# Patient Record
Sex: FEMALE | Race: BLACK OR AFRICAN AMERICAN | NOT HISPANIC OR LATINO | Employment: OTHER | URBAN - METROPOLITAN AREA
[De-identification: names, ages, dates, MRNs, and addresses within clinical notes are randomized per-mention and may not be internally consistent; named-entity substitution may affect disease eponyms.]

---

## 2017-09-09 ENCOUNTER — HOSPITAL ENCOUNTER (EMERGENCY)
Facility: HOSPITAL | Age: 62
Discharge: HOME | End: 2017-09-09
Attending: EMERGENCY MEDICINE
Payer: MEDICAID

## 2017-09-09 VITALS
WEIGHT: 293 LBS | BODY MASS INDEX: 47.09 KG/M2 | HEIGHT: 66 IN | OXYGEN SATURATION: 95 % | DIASTOLIC BLOOD PRESSURE: 83 MMHG | HEART RATE: 96 BPM | TEMPERATURE: 98 F | RESPIRATION RATE: 22 BRPM | SYSTOLIC BLOOD PRESSURE: 178 MMHG

## 2017-09-09 DIAGNOSIS — R11.2 NAUSEA AND VOMITING, INTRACTABILITY OF VOMITING NOT SPECIFIED, UNSPECIFIED VOMITING TYPE: Primary | ICD-10-CM

## 2017-09-09 LAB
ALBUMIN SERPL BCP-MCNC: 3.6 G/DL
ALP SERPL-CCNC: 113 U/L
ALT SERPL W/O P-5'-P-CCNC: 12 U/L
ANION GAP SERPL CALC-SCNC: 12 MMOL/L
AST SERPL-CCNC: 14 U/L
BASOPHILS # BLD AUTO: 0.01 K/UL
BASOPHILS NFR BLD: 0.1 %
BILIRUB SERPL-MCNC: 0.8 MG/DL
BUN SERPL-MCNC: 17 MG/DL
CALCIUM SERPL-MCNC: 9.2 MG/DL
CHLORIDE SERPL-SCNC: 104 MMOL/L
CO2 SERPL-SCNC: 25 MMOL/L
CREAT SERPL-MCNC: 1.1 MG/DL
DIFFERENTIAL METHOD: ABNORMAL
EOSINOPHIL # BLD AUTO: 0 K/UL
EOSINOPHIL NFR BLD: 0.2 %
ERYTHROCYTE [DISTWIDTH] IN BLOOD BY AUTOMATED COUNT: 13 %
EST. GFR  (AFRICAN AMERICAN): >60 ML/MIN/1.73 M^2
EST. GFR  (NON AFRICAN AMERICAN): 54 ML/MIN/1.73 M^2
GLUCOSE SERPL-MCNC: 105 MG/DL
HCT VFR BLD AUTO: 39.8 %
HGB BLD-MCNC: 12.9 G/DL
LIPASE SERPL-CCNC: 36 U/L
LYMPHOCYTES # BLD AUTO: 0.6 K/UL
LYMPHOCYTES NFR BLD: 5.7 %
MCH RBC QN AUTO: 30.7 PG
MCHC RBC AUTO-ENTMCNC: 32.4 G/DL
MCV RBC AUTO: 95 FL
MONOCYTES # BLD AUTO: 0.2 K/UL
MONOCYTES NFR BLD: 1.6 %
NEUTROPHILS # BLD AUTO: 9.3 K/UL
NEUTROPHILS NFR BLD: 92 %
PLATELET # BLD AUTO: 256 K/UL
PMV BLD AUTO: 11.7 FL
POTASSIUM SERPL-SCNC: 4.2 MMOL/L
PROT SERPL-MCNC: 8.4 G/DL
RBC # BLD AUTO: 4.2 M/UL
SODIUM SERPL-SCNC: 141 MMOL/L
WBC # BLD AUTO: 10.05 K/UL

## 2017-09-09 PROCEDURE — 99284 EMERGENCY DEPT VISIT MOD MDM: CPT | Mod: 25

## 2017-09-09 PROCEDURE — 96374 THER/PROPH/DIAG INJ IV PUSH: CPT

## 2017-09-09 PROCEDURE — 83690 ASSAY OF LIPASE: CPT

## 2017-09-09 PROCEDURE — 63600175 PHARM REV CODE 636 W HCPCS: Performed by: EMERGENCY MEDICINE

## 2017-09-09 PROCEDURE — 96372 THER/PROPH/DIAG INJ SC/IM: CPT

## 2017-09-09 PROCEDURE — 80053 COMPREHEN METABOLIC PANEL: CPT

## 2017-09-09 PROCEDURE — 25000003 PHARM REV CODE 250: Performed by: EMERGENCY MEDICINE

## 2017-09-09 PROCEDURE — 85025 COMPLETE CBC W/AUTO DIFF WBC: CPT

## 2017-09-09 RX ORDER — ONDANSETRON 2 MG/ML
4 INJECTION INTRAMUSCULAR; INTRAVENOUS
Status: COMPLETED | OUTPATIENT
Start: 2017-09-09 | End: 2017-09-09

## 2017-09-09 RX ORDER — KETOROLAC TROMETHAMINE 30 MG/ML
15 INJECTION, SOLUTION INTRAMUSCULAR; INTRAVENOUS
Status: DISCONTINUED | OUTPATIENT
Start: 2017-09-09 | End: 2017-09-09

## 2017-09-09 RX ORDER — ONDANSETRON 4 MG/1
4 TABLET, FILM COATED ORAL EVERY 6 HOURS
Qty: 12 TABLET | Refills: 0 | Status: SHIPPED | OUTPATIENT
Start: 2017-09-09 | End: 2017-09-18 | Stop reason: ALTCHOICE

## 2017-09-09 RX ORDER — KETOROLAC TROMETHAMINE 30 MG/ML
30 INJECTION, SOLUTION INTRAMUSCULAR; INTRAVENOUS
Status: COMPLETED | OUTPATIENT
Start: 2017-09-09 | End: 2017-09-09

## 2017-09-09 RX ORDER — PROMETHAZINE HYDROCHLORIDE 25 MG/ML
25 INJECTION, SOLUTION INTRAMUSCULAR; INTRAVENOUS
Status: COMPLETED | OUTPATIENT
Start: 2017-09-09 | End: 2017-09-09

## 2017-09-09 RX ADMIN — ONDANSETRON 4 MG: 2 INJECTION INTRAMUSCULAR; INTRAVENOUS at 07:09

## 2017-09-09 RX ADMIN — LIDOCAINE HYDROCHLORIDE: 20 SOLUTION ORAL; TOPICAL at 05:09

## 2017-09-09 RX ADMIN — PROMETHAZINE HYDROCHLORIDE 25 MG: 25 INJECTION INTRAMUSCULAR; INTRAVENOUS at 06:09

## 2017-09-09 RX ADMIN — KETOROLAC TROMETHAMINE 30 MG: 30 INJECTION, SOLUTION INTRAMUSCULAR at 06:09

## 2017-09-09 RX ADMIN — SODIUM CHLORIDE 1000 ML: 0.9 INJECTION, SOLUTION INTRAVENOUS at 06:09

## 2017-09-09 NOTE — ED TRIAGE NOTES
"Pt arrived to ED via EMS with c/o N/V beginning today. Pt also reports pain in the feet and left side of her neck x 4 days. Pt states she went to urgent care this morning and was given a shot to "stop the nausea", with no relief.  "

## 2017-09-09 NOTE — ED PROVIDER NOTES
Encounter Date: 9/9/2017    SCRIBE #1 NOTE: I, Fadi Brown, am scribing for, and in the presence of, Kirby Damon MD. Other sections scribed: HPI, ROS, PE.       History     Chief Complaint   Patient presents with    Abdominal Pain     Generalized abdominal pain since this AM. Pt was evaluated by urgent care pta where she recieved 500 ml NS and 25 mg phenergan. +n/v     CC: Abdominal Pain  HPI: This 62 y.o. obese female with HTN, asthma, and Hx of cholecystectomy presents to the ED via EMS from a nearby urgent care c/o nausea, vomiting, multiple loose stools, and moderate to severe abdominal pain acute onset 0800 this morning. Pt also reports neck pain earlier today that has since resolved.  states that 2 grandkids at home had N/V, but their Sx have already resolved. Pt reports being given a shot of something (25 mg Phenergan, per EMS) for her nausea, but it is starting to come back. Pt denies fever, chills, chest pain, SOB.        The history is provided by the patient and the EMS personnel.     Review of patient's allergies indicates:  No Known Allergies  Past Medical History:   Diagnosis Date    Asthma     Hypertension      Past Surgical History:   Procedure Laterality Date    CHOLECYSTECTOMY       No family history on file.  Social History   Substance Use Topics    Smoking status: Never Smoker    Smokeless tobacco: Never Used    Alcohol use No     Review of Systems   Constitutional: Negative for chills and fever.   HENT: Negative for ear pain, rhinorrhea and sore throat.    Eyes: Negative for pain and visual disturbance.   Respiratory: Negative for cough and shortness of breath.    Cardiovascular: Negative for chest pain.   Gastrointestinal: Positive for abdominal pain, diarrhea, nausea and vomiting. Negative for blood in stool.   Genitourinary: Negative for dysuria and flank pain.   Musculoskeletal: Positive for neck pain (resolved).   Skin: Negative for rash and wound.   Neurological:  Negative for syncope.   All other systems reviewed and are negative.      Physical Exam     Initial Vitals [09/09/17 1620]   BP Pulse Resp Temp SpO2   (!) 164/84 89 (!) 22 97.9 °F (36.6 °C) 99 %      MAP       110.67         Physical Exam    Nursing note reviewed.      PHYSICAL EXAM:    GEN:   Mild distress secondary to nausea, A & Ox3, atraumatic, obese, ill-appearing, non toxic  HEENT:  PERRLA, EOMI, dry membranes, nl conjunctiva, no scleral icterus, no nystagmus, no nodes/nodules, soft, supple, FROM, no trachial deviation, nexus negative  CV:   RRR no m/r/g, 2+ radial pulses, <2sec cap refill  RESP:  CTA B, no w/r/r, equal and bilateral chest rise, no respiratory distress  ABD:   soft, Nontender, Nondistended with large habitus, +BS  BACK:  FROM, no midline tenderness, no paraspinal tenderness  :   Deferred  EXT:   FROM, JIMENEZ x 4, no edema, no calf tenderness, no swelling  LYMPH:  no gross adenopathy  NEURO:  CN II-XII grossly intact, no obvious motor/sensory deficit, negative Romberg, no tremor, nl gait/coordination  PSYCH:   no SI/HI, no anxiety, nl mood/affect, nl judgement/thought process  SKIN:  Warm, dry, intact, no rashes/lesions or masses, nl color, no pallor    ED Course   Procedures  Labs Reviewed   CBC W/ AUTO DIFFERENTIAL - Abnormal; Notable for the following:        Result Value    Gran # 9.3 (*)     Lymph # 0.6 (*)     Mono # 0.2 (*)     Gran% 92.0 (*)     Lymph% 5.7 (*)     Mono% 1.6 (*)     All other components within normal limits   COMPREHENSIVE METABOLIC PANEL - Abnormal; Notable for the following:     eGFR if non  54 (*)     All other components within normal limits   LIPASE   URINALYSIS             Medical Decision Making:   Differential Diagnosis:   Nausea and vomiting differential includes but not exclusive to: DKA, metabolic disturbances, cyclic vomiting syndrome, hyperemesis gravid, Medications and toxic etiologies, Ethanol abuse, viral syndrome, gastritis,  Gastroenteritis, Mechanical obstruction, Gastroparesis, Cholecystitis, Pancreatitis, Hepatitis, Migraine, Increased intracranial pressure, Emotional responses, Psychogenic vomiting, Anxiety disorders, Pain, concussion, Myocardial infarction, Starvation    Clinical Tests:   Lab Tests: Ordered and Reviewed  The following lab test(s) were unremarkable: CBC and CMP  ED Management:  Treatment plan includes physical exam, cardiac monitoring, labs, pain control, IVF and supportive care.    1802 patient's IV continues to malfunction and difficult to get another one.  Will change meds to IM while awaiting for IV access    Patient improved after treatment and tolerating PO  Patient sleeping comfortably in room at time of discharge  Patient and family updated on care             Scribe Attestation:   Scribe #1: I performed the above scribed service and the documentation accurately describes the services I performed. I attest to the accuracy of the note.    Attending Attestation:           Physician Attestation for Scribe:  Physician Attestation Statement for Scribe #1: I, Kirby Damon MD, reviewed documentation, as scribed by Fadi Brown in my presence, and it is both accurate and complete.                 ED Course      Clinical Impression:   The encounter diagnosis was Nausea and vomiting, intractability of vomiting not specified, unspecified vomiting type.    Disposition:   Disposition: Discharged  Condition: Stable                        Kirby Damon MD  09/09/17 1959       Kirby Damon MD  09/09/17 2000

## 2017-09-18 ENCOUNTER — HOSPITAL ENCOUNTER (EMERGENCY)
Facility: HOSPITAL | Age: 62
Discharge: HOME | End: 2017-09-18
Attending: EMERGENCY MEDICINE
Payer: MEDICAID

## 2017-09-18 VITALS
HEART RATE: 80 BPM | TEMPERATURE: 98 F | BODY MASS INDEX: 47.09 KG/M2 | SYSTOLIC BLOOD PRESSURE: 145 MMHG | DIASTOLIC BLOOD PRESSURE: 81 MMHG | OXYGEN SATURATION: 97 % | HEIGHT: 66 IN | WEIGHT: 293 LBS | RESPIRATION RATE: 20 BRPM
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
## 2017-09-18 DIAGNOSIS — S16.1XXA CERVICAL STRAIN, ACUTE, INITIAL ENCOUNTER: Primary | ICD-10-CM

## 2017-09-18 DIAGNOSIS — S39.012A LUMBAR STRAIN, INITIAL ENCOUNTER: ICD-10-CM

## 2017-09-18 PROCEDURE — 99283 EMERGENCY DEPT VISIT LOW MDM: CPT

## 2017-09-18 RX ORDER — NAPROXEN 500 MG/1
500 TABLET ORAL 2 TIMES DAILY WITH MEALS
Qty: 10 TABLET | Refills: 0 | Status: SHIPPED | OUTPATIENT
Start: 2017-09-18 | End: 2017-09-23

## 2017-09-18 RX ORDER — METHOCARBAMOL 750 MG/1
1500 TABLET, FILM COATED ORAL 3 TIMES DAILY PRN
Qty: 30 TABLET | Refills: 0 | Status: SHIPPED | OUTPATIENT
Start: 2017-09-18 | End: 2017-09-23
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
## 2017-09-19 NOTE — DISCHARGE INSTRUCTIONS
Return to the emergency department if you develop worsening pain, sudden weakness, difficulty walking, or for any new or worsening medical concerns.
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
## 2019-04-06 ENCOUNTER — HOSPITAL ENCOUNTER (OUTPATIENT)
Facility: HOSPITAL | Age: 64
Discharge: HOME | End: 2019-04-07
Attending: EMERGENCY MEDICINE | Admitting: HOSPITALIST
Payer: MEDICAID

## 2019-04-06 DIAGNOSIS — R53.1 RIGHT SIDED WEAKNESS: ICD-10-CM

## 2019-04-06 DIAGNOSIS — R20.0 RIGHT ARM NUMBNESS: ICD-10-CM

## 2019-04-06 DIAGNOSIS — T78.3XXA ANGIOEDEMA, INITIAL ENCOUNTER: Primary | ICD-10-CM

## 2019-04-06 DIAGNOSIS — R22.0 FACIAL SWELLING: ICD-10-CM

## 2019-04-06 DIAGNOSIS — R29.898 WEAKNESS OF RIGHT UPPER EXTREMITY: ICD-10-CM

## 2019-04-06 PROBLEM — I10 HTN (HYPERTENSION): Status: ACTIVE | Noted: 2019-04-06

## 2019-04-06 LAB
ALBUMIN SERPL BCP-MCNC: 3.5 G/DL (ref 3.5–5.2)
ALP SERPL-CCNC: 94 U/L (ref 55–135)
ALT SERPL W/O P-5'-P-CCNC: 11 U/L (ref 10–44)
ANION GAP SERPL CALC-SCNC: 9 MMOL/L (ref 8–16)
AST SERPL-CCNC: 15 U/L (ref 10–40)
BASOPHILS # BLD AUTO: 0.02 K/UL (ref 0–0.2)
BASOPHILS NFR BLD: 0.2 % (ref 0–1.9)
BILIRUB SERPL-MCNC: 1 MG/DL (ref 0.1–1)
BUN SERPL-MCNC: 16 MG/DL (ref 8–23)
CALCIUM SERPL-MCNC: 9.5 MG/DL (ref 8.7–10.5)
CHLORIDE SERPL-SCNC: 103 MMOL/L (ref 95–110)
CHOLEST SERPL-MCNC: 205 MG/DL (ref 120–199)
CHOLEST/HDLC SERPL: 3.6 {RATIO} (ref 2–5)
CO2 SERPL-SCNC: 29 MMOL/L (ref 23–29)
CREAT SERPL-MCNC: 0.9 MG/DL (ref 0.5–1.4)
DIFFERENTIAL METHOD: ABNORMAL
EOSINOPHIL # BLD AUTO: 0 K/UL (ref 0–0.5)
EOSINOPHIL NFR BLD: 0.3 % (ref 0–8)
ERYTHROCYTE [DISTWIDTH] IN BLOOD BY AUTOMATED COUNT: 13.1 % (ref 11.5–14.5)
EST. GFR  (AFRICAN AMERICAN): >60 ML/MIN/1.73 M^2
EST. GFR  (NON AFRICAN AMERICAN): >60 ML/MIN/1.73 M^2
GLUCOSE SERPL-MCNC: 90 MG/DL (ref 70–110)
HCT VFR BLD AUTO: 36.2 % (ref 37–48.5)
HDLC SERPL-MCNC: 57 MG/DL (ref 40–75)
HDLC SERPL: 27.8 % (ref 20–50)
HGB BLD-MCNC: 11.5 G/DL (ref 12–16)
INR PPP: 1.1 (ref 0.8–1.2)
LDLC SERPL CALC-MCNC: 127.6 MG/DL (ref 63–159)
LYMPHOCYTES # BLD AUTO: 1.9 K/UL (ref 1–4.8)
LYMPHOCYTES NFR BLD: 18.6 % (ref 18–48)
MCH RBC QN AUTO: 30.5 PG (ref 27–31)
MCHC RBC AUTO-ENTMCNC: 31.8 G/DL (ref 32–36)
MCV RBC AUTO: 96 FL (ref 82–98)
MONOCYTES # BLD AUTO: 0.6 K/UL (ref 0.3–1)
MONOCYTES NFR BLD: 5.5 % (ref 4–15)
NEUTROPHILS # BLD AUTO: 7.8 K/UL (ref 1.8–7.7)
NEUTROPHILS NFR BLD: 75.4 % (ref 38–73)
NONHDLC SERPL-MCNC: 148 MG/DL
PLATELET # BLD AUTO: 262 K/UL (ref 150–350)
PMV BLD AUTO: 11 FL (ref 9.2–12.9)
POTASSIUM SERPL-SCNC: 3.7 MMOL/L (ref 3.5–5.1)
PROT SERPL-MCNC: 8 G/DL (ref 6–8.4)
PROTHROMBIN TIME: 11 SEC (ref 9–12.5)
RBC # BLD AUTO: 3.77 M/UL (ref 4–5.4)
SODIUM SERPL-SCNC: 141 MMOL/L (ref 136–145)
TRIGL SERPL-MCNC: 102 MG/DL (ref 30–150)
TSH SERPL DL<=0.005 MIU/L-ACNC: 1.34 UIU/ML (ref 0.4–4)
WBC # BLD AUTO: 10.31 K/UL (ref 3.9–12.7)

## 2019-04-06 PROCEDURE — 99285 EMERGENCY DEPT VISIT HI MDM: CPT

## 2019-04-06 PROCEDURE — G0378 HOSPITAL OBSERVATION PER HR: HCPCS

## 2019-04-06 PROCEDURE — 99204 PR OFFICE/OUTPT VISIT, NEW, LEVL IV, 45-59 MIN: ICD-10-PCS | Mod: GT,,, | Performed by: PSYCHIATRY & NEUROLOGY

## 2019-04-06 PROCEDURE — 84443 ASSAY THYROID STIM HORMONE: CPT

## 2019-04-06 PROCEDURE — 93005 ELECTROCARDIOGRAM TRACING: CPT

## 2019-04-06 PROCEDURE — 63600175 PHARM REV CODE 636 W HCPCS: Performed by: EMERGENCY MEDICINE

## 2019-04-06 PROCEDURE — 96372 THER/PROPH/DIAG INJ SC/IM: CPT | Mod: 59 | Performed by: EMERGENCY MEDICINE

## 2019-04-06 PROCEDURE — 93010 ELECTROCARDIOGRAM REPORT: CPT | Mod: ,,, | Performed by: INTERNAL MEDICINE

## 2019-04-06 PROCEDURE — 99204 OFFICE O/P NEW MOD 45 MIN: CPT | Mod: GT,,, | Performed by: PSYCHIATRY & NEUROLOGY

## 2019-04-06 PROCEDURE — 82607 VITAMIN B-12: CPT

## 2019-04-06 PROCEDURE — 25000003 PHARM REV CODE 250: Performed by: PHYSICIAN ASSISTANT

## 2019-04-06 PROCEDURE — 85610 PROTHROMBIN TIME: CPT

## 2019-04-06 PROCEDURE — 80053 COMPREHEN METABOLIC PANEL: CPT

## 2019-04-06 PROCEDURE — 93010 EKG 12-LEAD: ICD-10-PCS | Mod: ,,, | Performed by: INTERNAL MEDICINE

## 2019-04-06 PROCEDURE — 80061 LIPID PANEL: CPT

## 2019-04-06 PROCEDURE — 85025 COMPLETE CBC W/AUTO DIFF WBC: CPT

## 2019-04-06 PROCEDURE — 83036 HEMOGLOBIN GLYCOSYLATED A1C: CPT

## 2019-04-06 RX ORDER — FAMOTIDINE 20 MG/1
20 TABLET, FILM COATED ORAL 2 TIMES DAILY
Status: DISCONTINUED | OUTPATIENT
Start: 2019-04-06 | End: 2019-04-07 | Stop reason: HOSPADM

## 2019-04-06 RX ORDER — ASPIRIN 325 MG
325 TABLET ORAL DAILY
Status: DISCONTINUED | OUTPATIENT
Start: 2019-04-07 | End: 2019-04-07 | Stop reason: HOSPADM

## 2019-04-06 RX ORDER — DIPHENHYDRAMINE HCL 25 MG
25 CAPSULE ORAL EVERY 8 HOURS
Status: DISCONTINUED | OUTPATIENT
Start: 2019-04-06 | End: 2019-04-07 | Stop reason: HOSPADM

## 2019-04-06 RX ORDER — ATORVASTATIN CALCIUM 10 MG/1
10 TABLET, FILM COATED ORAL DAILY
Status: DISCONTINUED | OUTPATIENT
Start: 2019-04-07 | End: 2019-04-07

## 2019-04-06 RX ORDER — SODIUM CHLORIDE 0.9 % (FLUSH) 0.9 %
10 SYRINGE (ML) INJECTION
Status: DISCONTINUED | OUTPATIENT
Start: 2019-04-06 | End: 2019-04-07 | Stop reason: HOSPADM

## 2019-04-06 RX ORDER — RAMELTEON 8 MG/1
8 TABLET ORAL NIGHTLY PRN
Status: DISCONTINUED | OUTPATIENT
Start: 2019-04-06 | End: 2019-04-07 | Stop reason: HOSPADM

## 2019-04-06 RX ORDER — POLYETHYLENE GLYCOL 3350 17 G/17G
17 POWDER, FOR SOLUTION ORAL DAILY
Status: DISCONTINUED | OUTPATIENT
Start: 2019-04-07 | End: 2019-04-07 | Stop reason: HOSPADM

## 2019-04-06 RX ORDER — ENOXAPARIN SODIUM 100 MG/ML
40 INJECTION SUBCUTANEOUS EVERY 24 HOURS
Status: DISCONTINUED | OUTPATIENT
Start: 2019-04-06 | End: 2019-04-07 | Stop reason: HOSPADM

## 2019-04-06 RX ORDER — ONDANSETRON 2 MG/ML
8 INJECTION INTRAMUSCULAR; INTRAVENOUS EVERY 8 HOURS PRN
Status: DISCONTINUED | OUTPATIENT
Start: 2019-04-06 | End: 2019-04-07 | Stop reason: HOSPADM

## 2019-04-06 RX ORDER — DIPHENHYDRAMINE HCL 25 MG
25 CAPSULE ORAL EVERY 6 HOURS PRN
Status: DISCONTINUED | OUTPATIENT
Start: 2019-04-06 | End: 2019-04-06

## 2019-04-06 RX ORDER — FERROUS GLUCONATE 324(38)MG
324 TABLET ORAL
Status: DISCONTINUED | OUTPATIENT
Start: 2019-04-07 | End: 2019-04-07 | Stop reason: HOSPADM

## 2019-04-06 RX ORDER — FLUTICASONE FUROATE AND VILANTEROL 100; 25 UG/1; UG/1
1 POWDER RESPIRATORY (INHALATION) DAILY
Status: DISCONTINUED | OUTPATIENT
Start: 2019-04-07 | End: 2019-04-06

## 2019-04-06 RX ORDER — IPRATROPIUM BROMIDE AND ALBUTEROL SULFATE 2.5; .5 MG/3ML; MG/3ML
3 SOLUTION RESPIRATORY (INHALATION) EVERY 4 HOURS PRN
Status: DISCONTINUED | OUTPATIENT
Start: 2019-04-06 | End: 2019-04-07 | Stop reason: HOSPADM

## 2019-04-06 RX ORDER — ACETAMINOPHEN 500 MG
500 TABLET ORAL EVERY 6 HOURS PRN
Status: DISCONTINUED | OUTPATIENT
Start: 2019-04-06 | End: 2019-04-07 | Stop reason: HOSPADM

## 2019-04-06 RX ORDER — HYDRALAZINE HYDROCHLORIDE 20 MG/ML
10 INJECTION INTRAMUSCULAR; INTRAVENOUS EVERY 8 HOURS PRN
Status: DISCONTINUED | OUTPATIENT
Start: 2019-04-06 | End: 2019-04-07

## 2019-04-06 RX ORDER — IBUPROFEN 200 MG
200 TABLET ORAL EVERY 6 HOURS PRN
COMMUNITY
End: 2019-04-06 | Stop reason: ALTCHOICE

## 2019-04-06 RX ADMIN — ACETAMINOPHEN 500 MG: 500 TABLET, FILM COATED ORAL at 09:04

## 2019-04-06 RX ADMIN — DIPHENHYDRAMINE HYDROCHLORIDE 25 MG: 25 CAPSULE ORAL at 06:04

## 2019-04-06 RX ADMIN — ENOXAPARIN SODIUM 40 MG: 100 INJECTION SUBCUTANEOUS at 06:04

## 2019-04-06 RX ADMIN — FAMOTIDINE 20 MG: 20 TABLET ORAL at 09:04

## 2019-04-06 NOTE — NURSING
Report received from Nathaly in ED. Pt to come to room 330B. Food/Nutrition notified for dinner tray.

## 2019-04-06 NOTE — ASSESSMENT & PLAN NOTE
Area of right lower lip swelling. No involvement of posterior oropharynx, tongue, or sublingual space. Likely due to lisinopril. Last took almost 24 hrs ago,  reports improving now.  Will start pepcid/benadryl  D/c lisinopril  Aspiration precautions

## 2019-04-06 NOTE — SUBJECTIVE & OBJECTIVE
Past Medical History:   Diagnosis Date    Asthma     Bronchitis     Hypertension        Past Surgical History:   Procedure Laterality Date    ANKLE SURGERY       SECTION      CHOLECYSTECTOMY         Review of patient's allergies indicates:  No Known Allergies    No current facility-administered medications on file prior to encounter.      Current Outpatient Medications on File Prior to Encounter   Medication Sig    albuterol (ACCUNEB) 0.63 mg/3 mL Nebu Take 0.63 mg by nebulization every 6 (six) hours as needed.    cyclobenzaprine (FEXMID) 7.5 MG Tab Take 10 mg by mouth 3 (three) times daily as needed.    ferrous gluconate (FERGON) 324 MG tablet Take 324 mg by mouth daily with breakfast.    FLUTICASONE/VILANTEROL (BREO ELLIPTA INHL) Inhale into the lungs.    lisinopril-hydrochlorothiazide (PRINZIDE,ZESTORETIC) 20-25 mg Tab Take 1 tablet by mouth once daily.    [DISCONTINUED] ibuprofen (ADVIL,MOTRIN) 200 MG tablet Take 200 mg by mouth every 6 (six) hours as needed for Pain.    fluticasone-salmeterol 250-50 mcg/dose (ADVAIR) 250-50 mcg/dose diskus inhaler Inhale 1 puff into the lungs 2 (two) times daily.     Family History     None        Tobacco Use    Smoking status: Never Smoker    Smokeless tobacco: Never Used   Substance and Sexual Activity    Alcohol use: No    Drug use: No    Sexual activity: Not on file     Review of Systems   Constitutional: Negative for chills and fever.   HENT: Negative for nosebleeds and tinnitus.    Eyes: Negative for photophobia and visual disturbance.   Respiratory: Negative for shortness of breath and wheezing.    Cardiovascular: Negative for chest pain, palpitations and leg swelling.   Gastrointestinal: Negative for abdominal distention, nausea and vomiting.   Genitourinary: Negative for dysuria, flank pain and hematuria.   Musculoskeletal: Negative for gait problem and joint swelling.   Skin: Negative for rash and wound.   Neurological: Positive for facial  asymmetry (facial swelling), weakness (all right sided) and numbness (all right sided). Negative for seizures and syncope.     Objective:     Vital Signs (Most Recent):  Temp: 98.8 °F (37.1 °C) (04/06/19 1743)  Pulse: 88 (04/06/19 1743)  Resp: 18 (04/06/19 1743)  BP: (!) 148/68 (04/06/19 1743)  SpO2: 100 % (04/06/19 1743) Vital Signs (24h Range):  Temp:  [98.7 °F (37.1 °C)-98.9 °F (37.2 °C)] 98.8 °F (37.1 °C)  Pulse:  [] 88  Resp:  [14-19] 18  SpO2:  [98 %-100 %] 100 %  BP: (132-182)/(62-99) 148/68     Weight: 134.7 kg (297 lb)  Body mass index is 47.94 kg/m².    Physical Exam   Constitutional: She is oriented to person, place, and time. She appears well-developed and well-nourished. No distress.   HENT:   Head: Normocephalic and atraumatic.   Right Ear: External ear normal.   Left Ear: External ear normal.   Swelling and tenderness to right lower lip, stops at midline. No tenderness of any teeth, no swelling to sublingual space or lower mandible, no posterior oropharynx swelling. Tolerating secretions, no difficulty speaking   Eyes: Conjunctivae and EOM are normal. Right eye exhibits no discharge. Left eye exhibits no discharge.   Neck: Normal range of motion. No thyromegaly present.   Cardiovascular: Normal rate and regular rhythm.   No murmur heard.  Pulmonary/Chest: Effort normal and breath sounds normal. No respiratory distress.   Abdominal: Soft. Bowel sounds are normal. She exhibits no distension and no mass. There is no tenderness.   Musculoskeletal: She exhibits no edema, tenderness or deformity.   Neurological: She is alert and oriented to person, place, and time.   5/5 strength lower extremities, able to hold both legs for 5 seconds off stretch. Finger to thumb intact. 5/5 strength left UE. Unable to lift right UE off of stretcher. When upper extremity is lifted patient able to hold a loft for 5 seconds.   3/5 strength with bending the right elbow   Skin: Skin is warm and dry.   Psychiatric: She has  a normal mood and affect. Her behavior is normal.   Nursing note and vitals reviewed.        CRANIAL NERVES     CN III, IV, VI   Extraocular motions are normal.        Significant Labs:   CBC:   Recent Labs   Lab 04/06/19  1544   WBC 10.31   HGB 11.5*   HCT 36.2*        CMP:   Recent Labs   Lab 04/06/19  1544      K 3.7      CO2 29   GLU 90   BUN 16   CREATININE 0.9   CALCIUM 9.5   PROT 8.0   ALBUMIN 3.5   BILITOT 1.0   ALKPHOS 94   AST 15   ALT 11   ANIONGAP 9   EGFRNONAA >60     Coagulation:   Recent Labs   Lab 04/06/19  1544   INR 1.1     Lipid Panel:   Recent Labs   Lab 04/06/19  1544   CHOL 205*   HDL 57   LDLCALC 127.6   TRIG 102   CHOLHDL 27.8     TSH:   Recent Labs   Lab 04/06/19  1544   TSH 1.343       Significant Imaging:    Imaging Results          X-Ray Chest AP Portable (Final result)  Result time 04/06/19 16:05:18    Final result by Fran Daly MD (04/06/19 16:05:18)                 Impression:      1. No acute radiographic findings in the chest on this single view.      Electronically signed by: Fran Daly MD  Date:    04/06/2019  Time:    16:05             Narrative:    EXAMINATION:  XR CHEST AP PORTABLE    CLINICAL HISTORY:  Stroke;    TECHNIQUE:  Single frontal view of the chest was performed.    COMPARISON:  Radiograph 11/09/2016.    FINDINGS:  Cardiac monitoring leads project over the bilateral hemithoraces.  Mediastinal structures are midline.  Hilar contours are unremarkable.  Cardiac silhouette is normal in size.  Lung volumes are symmetric.  No consolidation.  No pneumothorax or pleural effusions.  No free air beneath the diaphragm.                               CT Head Without Contrast (Final result)  Result time 04/06/19 15:13:54    Final result by Dalton Pantoja MD (04/06/19 15:13:54)                 Impression:      No acute intracranial process.  MRI of the brain may be obtained for further evaluation, as clinically warranted.      Electronically signed  by: Dalton Pantoja MD  Date:    04/06/2019  Time:    15:13             Narrative:    EXAMINATION:  CT HEAD WITHOUT CONTRAST    CLINICAL HISTORY:  Sensation loss;    TECHNIQUE:  Low dose axial images were obtained through the head.  Coronal and sagittal reformations were also performed. Contrast was not administered.    COMPARISON:  CT scan of the head dated 11/09/2016.    FINDINGS:  The subcutaneous tissues are unremarkable.  The bony calvarium intact.  The paranasal sinuses and mastoid air cells are clear.  The orbits and intraorbital contents are within normal limits.    The craniocervical junction is within normal limits.  There are no extra-axial fluid collections.  There is no evidence of intracranial hemorrhage.  The ventricles and sulci are within normal limits.  The gray-white differentiation is maintained.  There is no evidence of mass effect.  There is no dense vessel sign.

## 2019-04-06 NOTE — ED PROVIDER NOTES
Encounter Date: 2019     This is a 64 y.o. female complaining of swelling to right lower lip and right facial swelling that she first noticed upon waking this moring. Also reports numbness to her right upper and lower extremities that were present upon waking. Head CT ordered.    I have evaluated and conducted a medical screening exam with initial orders entered, if indicated, to expedite care. The patient will be placed in a treatment area when one is available. Care will be transferred to an alternate provider for a full assessment including but not limited to: history, physical exam, additional orders, and final disposition.    Paul Cole NP      SCRIBE #1 NOTE: IIsaac am scribing for, and in the presence of,  Ochoa Kenny MD. I have scribed the following portions of the note - Other sections scribed: HPI, ROS.       History     Chief Complaint   Patient presents with    Extremity Weakness     pt c/o right sided weakness and swelling to right jaw area began this morning     CC: Extremity Weakness    64 year old female  has a past medical history of Asthma, Bronchitis, and Hypertension presents to the ED for evaluation of right sided facial numbness, right arm weakness, and pain w/ swelling to right jaw that began this morning upon waking up at 8 am. Pt denies any associated dental pain. Pt reports she went to sleep normal last night. She reports chills last night. No other symptoms reported.     The history is provided by the patient. No  was used.     Review of patient's allergies indicates:  No Known Allergies  Past Medical History:   Diagnosis Date    Asthma     Bronchitis     Hypertension      Past Surgical History:   Procedure Laterality Date    ANKLE SURGERY       SECTION      CHOLECYSTECTOMY       History reviewed. No pertinent family history.  Social History     Tobacco Use    Smoking status: Never Smoker    Smokeless tobacco: Never Used    Substance Use Topics    Alcohol use: No    Drug use: No     Review of Systems   Constitutional: Negative for appetite change and fever.   HENT: Positive for facial swelling (to right jaw). Negative for rhinorrhea and sore throat.    Eyes: Negative for visual disturbance.   Respiratory: Negative for cough and shortness of breath.    Cardiovascular: Negative for chest pain.   Gastrointestinal: Negative for abdominal pain.   Genitourinary: Negative for dysuria.   Musculoskeletal: Negative for gait problem.   Skin: Negative for rash.   Neurological: Positive for weakness (to right arm) and numbness (to face). Negative for syncope.       Physical Exam     Initial Vitals [04/06/19 1459]   BP Pulse Resp Temp SpO2   132/62 100 18 98.9 °F (37.2 °C) 100 %      MAP       --         Physical Exam  The patient was examined specifically for the following:   General:No significant distress, Good color, Warm and dry. Head and neck:Scalp atraumatic, Neck supple. Neurological:Appropriate conversation, Gross motor deficits. Eyes:Conjugate gaze, Clear corneas. ENT: No epistaxis. Cardiac: Regular rate and rhythm, Grossly normal heart tones. Pulmonary: Wheezing, Rales. Gastrointestinal: Abdominal tenderness, Abdominal distention. Musculoskeletal: Extremity deformity, Apparent pain with range of motion of the joints. Skin: Rash.   The findings on examination were normal except for the following:  The patient has swelling and tenderness of the right mandibular face.  Cranial nerves appear to be intact.  There are no visual field cuts.  Patient is appropriate in conversation.  She seems to get some questions wrong.  She has weakness in the right upper extremity.  The family reports that she is walking.  She will not hold the right lower extremity off of the stretcher.  The abdomen is soft.  The lungs are clear.  The heart tones are normal.  ED Course   Procedures  Labs Reviewed   CBC W/ AUTO DIFFERENTIAL - Abnormal; Notable for the  following components:       Result Value    RBC 3.77 (*)     Hemoglobin 11.5 (*)     Hematocrit 36.2 (*)     MCHC 31.8 (*)     Gran # (ANC) 7.8 (*)     Gran% 75.4 (*)     All other components within normal limits   LIPID PANEL - Abnormal; Notable for the following components:    Cholesterol 205 (*)     All other components within normal limits   COMPREHENSIVE METABOLIC PANEL   PROTIME-INR   TSH   POCT GLUCOSE, HAND-HELD DEVICE   POCT GLUCOSE MONITORING CONTINUOUS     EKG Readings: (Independently Interpreted)   This patient is in a normal sinus rhythm with a heart rate of 97.  The p.r. QRS and QT intervals are normal.  There is no definite evidence of acute myocardial infarction or malignant arrhythmia.  There are some low voltage QRS complexes.  There are some nonspecific T-wave changes.       Imaging Results          X-Ray Chest AP Portable (Final result)  Result time 04/06/19 16:05:18    Final result by Fran Daly MD (04/06/19 16:05:18)                 Impression:      1. No acute radiographic findings in the chest on this single view.      Electronically signed by: Fran Daly MD  Date:    04/06/2019  Time:    16:05             Narrative:    EXAMINATION:  XR CHEST AP PORTABLE    CLINICAL HISTORY:  Stroke;    TECHNIQUE:  Single frontal view of the chest was performed.    COMPARISON:  Radiograph 11/09/2016.    FINDINGS:  Cardiac monitoring leads project over the bilateral hemithoraces.  Mediastinal structures are midline.  Hilar contours are unremarkable.  Cardiac silhouette is normal in size.  Lung volumes are symmetric.  No consolidation.  No pneumothorax or pleural effusions.  No free air beneath the diaphragm.                               CT Head Without Contrast (Final result)  Result time 04/06/19 15:13:54    Final result by Dalton Pantoja MD (04/06/19 15:13:54)                 Impression:      No acute intracranial process.  MRI of the brain may be obtained for further evaluation, as clinically  warranted.      Electronically signed by: Dalton Pantoja MD  Date:    04/06/2019  Time:    15:13             Narrative:    EXAMINATION:  CT HEAD WITHOUT CONTRAST    CLINICAL HISTORY:  Sensation loss;    TECHNIQUE:  Low dose axial images were obtained through the head.  Coronal and sagittal reformations were also performed. Contrast was not administered.    COMPARISON:  CT scan of the head dated 11/09/2016.    FINDINGS:  The subcutaneous tissues are unremarkable.  The bony calvarium intact.  The paranasal sinuses and mastoid air cells are clear.  The orbits and intraorbital contents are within normal limits.    The craniocervical junction is within normal limits.  There are no extra-axial fluid collections.  There is no evidence of intracranial hemorrhage.  The ventricles and sulci are within normal limits.  The gray-white differentiation is maintained.  There is no evidence of mass effect.  There is no dense vessel sign.                              Medical decision making:  Given the above this patient complains of numbness and tingling in both feet numbness in the right side of her body right leg right arm.  The face is spared.  She has weakness in the right upper extremity.  The neck is supple.  Mental status examination, cranial nerves, motor and sensory examination are normal. Her symptoms began this morning.  Stroke remains in the differential diagnosis.  The patient was seen by vascular Neurology who feels this stroke is actually unlikely.  The patient is outside the window for tPA.  The patient has swelling on her face.  Angioedema is considered as well as cellulitis.  The patient is very tender at this location.  But there are no tender teeth.  I will treat her with Benadryl.  Note should be made that the teeth do not seem to be tender.  I will discuss case with hospital medicine.  The patient is on lisinopril.  I am concerned that this could be angioedema in the face.                Scribe Attestation:    Scribe #1: I performed the above scribed service and the documentation accurately describes the services I performed. I attest to the accuracy of the note.    Attending Attestation:           Physician Attestation for Scribe:  Physician Attestation Statement for Scribe #1: I, Ochoa Kenny MD, reviewed documentation, as scribed by Isaac Jackson in my presence, and it is both accurate and complete.                    Clinical Impression:       ICD-10-CM ICD-9-CM   1. Weakness of right upper extremity R29.898 729.89   2. Facial swelling R22.0 784.2   3. Right arm numbness R20.2 782.0                                Ochoa Kenny MD  04/06/19 1657       Ochoa Kenny MD  04/06/19 1706

## 2019-04-06 NOTE — HOSPITAL COURSE
Santa Plascencia 64 y.o. female admitted to observation for right sided weakness, right facial numbness and right lip/tongue swelling due to angioedema. In the ED, CT head without acute abnormality, chest x-ray without acute process, EKG of NSR, Tele stroke consulted and recommended MRI. Angioedema felt due to lisinopril-now listed as allergy.  US carotid shwoed 50-69% luminal narrowing of left distal ICA. Patient refused MRI. On 4/7/2019, Angioedema improved with H1, H2. Right sided weakness and facial numbness improved overnight but not at baseline. Patient agreed for MRI with ativan prior this am. MRI no acute stroke. Symptoms significantly improved throughout stay. Possible TIA as patient have risk factors. Instruct patient to continue ASA and statin (new). Instruct patient need to follow up with Vascular Surgery for left carotid stenosis.  Due to insurance, patient will need to follow with PCP to obtain refer to vascular surgery.  Discharge summary will be routed to PCP.  Patient stable for discharge home with home health.

## 2019-04-06 NOTE — H&P
"[facility] Hospital Medicine  History & Physical    Patient Name: Santa Plascencia  MRN: [id number]  Admission Date: 2019  Attending Physician: Leticia Aleman MD   Primary Care Provider: Joao Wilcox Jr, MD         Patient information was obtained from patient, past medical records and ER records.     Subjective:     Principal Problem:Right sided numbness    Chief Complaint:   Chief Complaint   Patient presents with    Extremity Weakness     pt c/o right sided weakness and swelling to right jaw area began this morning        HPI: Santa Plascencia 64 y.o. female with HTN presents to the hospital with a chief complaint of right sided weakness. She reports she awoke this morning with right sided facial/arm numbness. This numbness is improving now, but still present. She also felt as though her right arm was weak as well. She denies any vision changes. She thought she may have had a "little headache" this morning, but it resolved on its own. She also awoke this morning with right lower lip swelling. Her  reports it has always been localized to the right lower lip. It is improving now the  reports. She has never had difficulty talking or swallowing per the  and patient. She denies any numbness or weakness legs, but reports she occasionally has sharp pains in her feet. She denies fever, SOB, difficulty swallowing, incontinence, and numbness to the legs, and slurred speech. She endorses right sided weakness/numbness and facial swelling. She takes lisinopril for BP and has taken for many years. She last took lisinopril last night.     In the ED, CT head without acute abnormality, chest x-ray without acute process, EKG of NSR, Tele stroke consulted and recommended MRI.     Past Medical History:   Diagnosis Date    Asthma     Bronchitis     Hypertension        Past Surgical History:   Procedure Laterality Date    ANKLE SURGERY       SECTION      CHOLECYSTECTOMY   "       Review of patient's allergies indicates:  No Known Allergies    No current facility-administered medications on file prior to encounter.      Current Outpatient Medications on File Prior to Encounter   Medication Sig    albuterol (ACCUNEB) 0.63 mg/3 mL Nebu Take 0.63 mg by nebulization every 6 (six) hours as needed.    cyclobenzaprine (FEXMID) 7.5 MG Tab Take 10 mg by mouth 3 (three) times daily as needed.    ferrous gluconate (FERGON) 324 MG tablet Take 324 mg by mouth daily with breakfast.    FLUTICASONE/VILANTEROL (BREO ELLIPTA INHL) Inhale into the lungs.    lisinopril-hydrochlorothiazide (PRINZIDE,ZESTORETIC) 20-25 mg Tab Take 1 tablet by mouth once daily.    [DISCONTINUED] ibuprofen (ADVIL,MOTRIN) 200 MG tablet Take 200 mg by mouth every 6 (six) hours as needed for Pain.    fluticasone-salmeterol 250-50 mcg/dose (ADVAIR) 250-50 mcg/dose diskus inhaler Inhale 1 puff into the lungs 2 (two) times daily.     Family History     None        Tobacco Use    Smoking status: Never Smoker    Smokeless tobacco: Never Used   Substance and Sexual Activity    Alcohol use: No    Drug use: No    Sexual activity: Not on file     Review of Systems   Constitutional: Negative for chills and fever.   HENT: Negative for nosebleeds and tinnitus.    Eyes: Negative for photophobia and visual disturbance.   Respiratory: Negative for shortness of breath and wheezing.    Cardiovascular: Negative for chest pain, palpitations and leg swelling.   Gastrointestinal: Negative for abdominal distention, nausea and vomiting.   Genitourinary: Negative for dysuria, flank pain and hematuria.   Musculoskeletal: Negative for gait problem and joint swelling.   Skin: Negative for rash and wound.   Neurological: Positive for facial asymmetry (facial swelling), weakness (all right sided) and numbness (all right sided). Negative for seizures and syncope.     Objective:     Vital Signs (Most Recent):  Temp: 98.8 °F (37.1 °C) (04/06/19  1743)  Pulse: 88 (04/06/19 1743)  Resp: 18 (04/06/19 1743)  BP: (!) 148/68 (04/06/19 1743)  SpO2: 100 % (04/06/19 1743) Vital Signs (24h Range):  Temp:  [98.7 °F (37.1 °C)-98.9 °F (37.2 °C)] 98.8 °F (37.1 °C)  Pulse:  [] 88  Resp:  [14-19] 18  SpO2:  [98 %-100 %] 100 %  BP: (132-182)/(62-99) 148/68     Weight: 134.7 kg (297 lb)  Body mass index is 47.94 kg/m².    Physical Exam   Constitutional: She is oriented to person, place, and time. She appears well-developed and well-nourished. No distress.   HENT:   Head: Normocephalic and atraumatic.   Right Ear: External ear normal.   Left Ear: External ear normal.   Swelling and tenderness to right lower lip, stops at midline. No tenderness of any teeth, no swelling to sublingual space or lower mandible, no posterior oropharynx swelling. Tolerating secretions, no difficulty speaking   Eyes: Conjunctivae and EOM are normal. Right eye exhibits no discharge. Left eye exhibits no discharge.   Neck: Normal range of motion. No thyromegaly present.   Cardiovascular: Normal rate and regular rhythm.   No murmur heard.  Pulmonary/Chest: Effort normal and breath sounds normal. No respiratory distress.   Abdominal: Soft. Bowel sounds are normal. She exhibits no distension and no mass. There is no tenderness.   Musculoskeletal: She exhibits no edema, tenderness or deformity.   Neurological: She is alert and oriented to person, place, and time.   5/5 strength lower extremities, able to hold both legs for 5 seconds off stretch. Finger to thumb intact. 5/5 strength left UE. Unable to lift right UE off of stretcher. When upper extremity is lifted patient able to hold a loft for 5 seconds.   3/5 strength with bending the right elbow   Skin: Skin is warm and dry.   Psychiatric: She has a normal mood and affect. Her behavior is normal.   Nursing note and vitals reviewed.        CRANIAL NERVES     CN III, IV, VI   Extraocular motions are normal.        Significant Labs:   CBC:   Recent  Labs   Lab 04/06/19  1544   WBC 10.31   HGB 11.5*   HCT 36.2*        CMP:   Recent Labs   Lab 04/06/19  1544      K 3.7      CO2 29   GLU 90   BUN 16   CREATININE 0.9   CALCIUM 9.5   PROT 8.0   ALBUMIN 3.5   BILITOT 1.0   ALKPHOS 94   AST 15   ALT 11   ANIONGAP 9   EGFRNONAA >60     Coagulation:   Recent Labs   Lab 04/06/19  1544   INR 1.1     Lipid Panel:   Recent Labs   Lab 04/06/19  1544   CHOL 205*   HDL 57   LDLCALC 127.6   TRIG 102   CHOLHDL 27.8     TSH:   Recent Labs   Lab 04/06/19  1544   TSH 1.343       Significant Imaging:    Imaging Results          X-Ray Chest AP Portable (Final result)  Result time 04/06/19 16:05:18    Final result by Fran Daly MD (04/06/19 16:05:18)                 Impression:      1. No acute radiographic findings in the chest on this single view.      Electronically signed by: Fran Daly MD  Date:    04/06/2019  Time:    16:05             Narrative:    EXAMINATION:  XR CHEST AP PORTABLE    CLINICAL HISTORY:  Stroke;    TECHNIQUE:  Single frontal view of the chest was performed.    COMPARISON:  Radiograph 11/09/2016.    FINDINGS:  Cardiac monitoring leads project over the bilateral hemithoraces.  Mediastinal structures are midline.  Hilar contours are unremarkable.  Cardiac silhouette is normal in size.  Lung volumes are symmetric.  No consolidation.  No pneumothorax or pleural effusions.  No free air beneath the diaphragm.                               CT Head Without Contrast (Final result)  Result time 04/06/19 15:13:54    Final result by Dalton Pantoja MD (04/06/19 15:13:54)                 Impression:      No acute intracranial process.  MRI of the brain may be obtained for further evaluation, as clinically warranted.      Electronically signed by: Dalton Pantoja MD  Date:    04/06/2019  Time:    15:13             Narrative:    EXAMINATION:  CT HEAD WITHOUT CONTRAST    CLINICAL HISTORY:  Sensation loss;    TECHNIQUE:  Low dose axial images were  obtained through the head.  Coronal and sagittal reformations were also performed. Contrast was not administered.    COMPARISON:  CT scan of the head dated 11/09/2016.    FINDINGS:  The subcutaneous tissues are unremarkable.  The bony calvarium intact.  The paranasal sinuses and mastoid air cells are clear.  The orbits and intraorbital contents are within normal limits.    The craniocervical junction is within normal limits.  There are no extra-axial fluid collections.  There is no evidence of intracranial hemorrhage.  The ventricles and sulci are within normal limits.  The gray-white differentiation is maintained.  There is no evidence of mass effect.  There is no dense vessel sign.                                  Assessment/Plan:     * Right sided numbness  Woke up with symptoms this morning. Symptoms improving now. CT without acute abnormality. Stroke neuro recommends MRI.   MRI  Carotid US/Echo  Neurology consulted  PT/OT  Starting Aspirin and statin  Neuro checks, aspiration precautions  Nursing swallow asessment    HTN (hypertension)  Holding lisinopril/hctz combo until CVA ruled out. Will d/c lisinopril due to probable angioedema. Allow permissive HTN to 220    Angioedema  Area of right lower lip swelling. No involvement of posterior oropharynx, tongue, or sublingual space. Likely due to lisinopril. Last took almost 24 hrs ago,  reports improving now. Afebrile without leukocytosis.  Will start pepcid/benadryl  D/c lisinopril  Aspiration precautions      Weakness of right upper extremity  See above      VTE Risk Mitigation (From admission, onward)        Ordered     enoxaparin injection 40 mg  Daily      04/06/19 1747     IP VTE HIGH RISK PATIENT  Once      04/06/19 1747        VTE: lovenox  Code: full  Diet: cardiac  Dispo: pending MRI and neuro recs    ADDENDUM 19:47  Patient refused MRI. She declined medication for claustrauphobia as well. Will re-attempt in the morning.     Zaki Laboy,  BREE  Department of Hospital Medicine   [facility]

## 2019-04-06 NOTE — HPI
"Santa Plascencia 64 y.o. female with HTN presents to the hospital with a chief complaint of right sided weakness. She reports she awoke this morning with right sided facial/arm numbness. This numbness is improving now, but still present. She also felt as though her right arm was weak as well. She denies any vision changes. She thought she may have had a "little headache" this morning, but it resolved on its own. She also awoke this morning with right lower lip swelling. Her  reports it has always been localized to the right lower lip. It is improving now the  reports. She has never had difficulty talking or swallowing per the  and patient. She denies any numbness or weakness legs, but reports she occasionally has sharp pains in her feet. She denies fever, SOB, difficulty swallowing, incontinence, and numbness to the legs, and slurred speech. She endorses right sided weakness/numbness and facial swelling. She takes lisinopril for BP and has taken for many years. She last took lisinopril last night.     In the ED, CT head without acute abnormality, chest x-ray without acute process, EKG of NSR, Tele stroke consulted and recommended MRI.   "

## 2019-04-06 NOTE — ED TRIAGE NOTES
Pt arrived to ED with c/o extremity weakness. Pt complains of R sided arm weakness and numbness and Right jaw swelling. Pt states woke up this morning like this. Denies trouble breathing, states hard to swallow. NAD. Pt connected to continuous cardiac monitoring, bp cuff, and pulse ox. Call light within reach.

## 2019-04-06 NOTE — HPI
63 yo female with right face numbness and associated right arm numbness.  No clear triggers, went to bed normal. Has not had in the past.  Has not been treated. Has not improved.

## 2019-04-06 NOTE — ASSESSMENT & PLAN NOTE
Woke up with symptoms this morning. Symptoms improving now. CT without acute abnormality. Stroke neuro recommends MRI.   MRI  Carotid US/Echo  Neurology consulted  PT/OT  Starting Aspirin and statin  Neuro checks, aspiration precautions  Nursing swallow asessment

## 2019-04-06 NOTE — ASSESSMENT & PLAN NOTE
65 yo female with complaints of numbness on the right side. Unclear cause, outside of any treatment windows and exam fails to show cortical signs.

## 2019-04-06 NOTE — SUBJECTIVE & OBJECTIVE
"  Woke up with symptoms?: yes  Last known normal:        Recent bleeding noted: no  Does the patient take any Blood Thinners? no  Medications: No relevant medications      Past Medical History: hypertension    Past Surgical History: none    Family History: no relevant history    Social History: no smoking, no drinking, no drugs    Allergies: No Known Allergies     Review of Systems   Constitutional: Negative for chills and fever.   HENT: Negative for nosebleeds and sore throat.    Eyes: Negative for photophobia, pain and redness.   Respiratory: Negative for cough and shortness of breath.    Cardiovascular: Negative for chest pain and palpitations.   Gastrointestinal: Negative for abdominal pain, blood in stool, diarrhea and nausea.   Endocrine: Negative for cold intolerance and heat intolerance.   Genitourinary: Negative for difficulty urinating and hematuria.   Musculoskeletal: Negative for arthralgias, back pain and neck pain.   Skin: Negative for color change and rash.   Neurological: Negative for light-headedness and headaches.   Hematological: Does not bruise/bleed easily.   Psychiatric/Behavioral: Negative for confusion and hallucinations.     Objective:   Vitals: Blood pressure (!) 178/80, pulse 94, temperature 98.9 °F (37.2 °C), temperature source Oral, resp. rate 17, height 5' 6" (1.676 m), weight 134.7 kg (297 lb), SpO2 100 %, not currently breastfeeding.     CT READ: Yes  No hemmorhage. No mass effect. No early infarct signs.     Physical Exam   Constitutional: She appears well-developed and well-nourished.   HENT:   Head: Normocephalic and atraumatic.   Right Ear: External ear normal.   Left Ear: External ear normal.   Eyes: Conjunctivae and EOM are normal.   Neck: Normal range of motion. No tracheal deviation present.   Pulmonary/Chest: Effort normal. No respiratory distress.   Abdominal: She exhibits no distension.   Musculoskeletal: Normal range of motion.   Neurological: She is alert.   Skin: Skin is " dry.   Psychiatric: She has a normal mood and affect. Her behavior is normal. Judgment and thought content normal.   Vitals reviewed.

## 2019-04-06 NOTE — CONSULTS
"      [facility]  Vascular Neurology  Comprehensive Stroke Center  Tele-Consultation Note      Inpatient consult to Telemedicine-Stroke  Consult performed by: Luisito Sainz MD  Consult ordered by: Gurdeep Munroe MD  Reason for consult: right numbness          Consulting Provider: Spoke Physician:: DR GURDEEP MUNROE  Current Providers  No providers found    Patient Location: [facility]  Spoke hospital nurse at bedside with patient assisting consultant.     Patient information was obtained from patient.       Assessment/Plan:     STROKE DOCUMENTATION     Acute Stroke Times:        NIH Scale:        Modified [place]    Daniel Coma Scale:    ABCD2 Score:    [id number] Score:   HAS -BLED Score:   ICH Score:   Hunt & Cope Classification:       Diagnoses:   * Right sided numbness  63 yo female with complaints of numbness on the right side. Unclear cause, outside of any treatment windows and exam fails to show cortical signs.        Blood pressure (!) 178/80, pulse 94, temperature 98.9 °F (37.2 °C), temperature source Oral, resp. rate 17, height 5' 6" (1.676 m), weight 134.7 kg (297 lb), SpO2 100 %, not currently breastfeeding.  Alteplase Eligible?: No  Alteplase Recommendation: Alteplase not recommended due to Outside of treatment window  and Suspected stroke mimic   Possible Interventional Revascularization Candidate? No; No large vessel occlusion    Disposition Recommendation: admit to inpatient      Subjective:     History of Present Illness:  63 yo female with right face numbness and associated right arm numbness.  No clear triggers, went to bed normal. Has not had in the past.  Has not been treated. Has not improved.      Woke up with symptoms?: yes  Last known normal:        Recent bleeding noted: no  Does the patient take any Blood Thinners? no  Medications: No relevant medications      Past Medical History: hypertension    Past Surgical History: " "none    Family History: no relevant history    Social History: no smoking, no drinking, no drugs    Allergies: No Known Allergies     Review of Systems   Constitutional: Negative for chills and fever.   HENT: Negative for nosebleeds and sore throat.    Eyes: Negative for photophobia, pain and redness.   Respiratory: Negative for cough and shortness of breath.    Cardiovascular: Negative for chest pain and palpitations.   Gastrointestinal: Negative for abdominal pain, blood in stool, diarrhea and nausea.   Endocrine: Negative for cold intolerance and heat intolerance.   Genitourinary: Negative for difficulty urinating and hematuria.   Musculoskeletal: Negative for arthralgias, back pain and neck pain.   Skin: Negative for color change and rash.   Neurological: Negative for light-headedness and headaches.   Hematological: Does not bruise/bleed easily.   Psychiatric/Behavioral: Negative for confusion and hallucinations.     Objective:   Vitals: Blood pressure (!) 178/80, pulse 94, temperature 98.9 °F (37.2 °C), temperature source Oral, resp. rate 17, height 5' 6" (1.676 m), weight 134.7 kg (297 lb), SpO2 100 %, not currently breastfeeding.     CT READ: Yes  No hemmorhage. No mass effect. No early infarct signs.     Physical Exam   Constitutional: She appears well-developed and well-nourished.   HENT:   Head: Normocephalic and atraumatic.   Right Ear: External ear normal.   Left Ear: External ear normal.   Eyes: Conjunctivae and EOM are normal.   Neck: Normal range of motion. No tracheal deviation present.   Pulmonary/Chest: Effort normal. No respiratory distress.   Abdominal: She exhibits no distension.   Musculoskeletal: Normal range of motion.   Neurological: She is alert.   Skin: Skin is dry.   Psychiatric: She has a normal mood and affect. Her behavior is normal. Judgment and thought content normal.   Vitals reviewed.            Recommended the emergency room physician to have a brief discussion with the patient " and/or family if available regarding the risks and benefits of treatment, and to briefly document the occurrence of that discussion in his clinical encounter note.     The attending portion of this evaluation, treatment, and documentation was performed per Luisito Sainz MD via audiovisual.    Billing code:  (non-intervention mild to moderate stroke, TIA, some mimics)    · This patient has a critical neurological condition/illness, with some potential for high morbidity and mortality.  · There is a moderate probability for acute neurological change leading to clinical and possibly life-threatening deterioration requiring highest level of physician preparedness for urgent intervention.  · Care was coordinated with other physicians involved in the patient's care.  · Radiologic studies and laboratory data were reviewed and interpreted, and plan of care was re-assessed based on the results.  · Diagnosis, treatment options and prognosis may have been discussed with the patient and/or family members or caregiver.      In your opinion, this was a: Tier 2    Consult End Time: 3:56 PM     Luisito Sainz MD  [facility] Stroke Center  Vascular Neurology   [facility]

## 2019-04-06 NOTE — ASSESSMENT & PLAN NOTE
Holding lisinopril/hctz combo until CVA ruled out. Will d/c lisinopril due to probable angioedema. Allow permissive HTN to 220

## 2019-04-07 VITALS
SYSTOLIC BLOOD PRESSURE: 133 MMHG | TEMPERATURE: 99 F | BODY MASS INDEX: 47.09 KG/M2 | WEIGHT: 293 LBS | DIASTOLIC BLOOD PRESSURE: 62 MMHG | RESPIRATION RATE: 18 BRPM | HEART RATE: 68 BPM | OXYGEN SATURATION: 100 % | HEIGHT: 66 IN

## 2019-04-07 PROBLEM — E66.01 CLASS 3 SEVERE OBESITY DUE TO EXCESS CALORIES IN ADULT: Status: ACTIVE | Noted: 2019-04-07

## 2019-04-07 PROBLEM — E78.5 HYPERLIPIDEMIA: Status: ACTIVE | Noted: 2019-04-07

## 2019-04-07 PROBLEM — E66.813 CLASS 3 SEVERE OBESITY DUE TO EXCESS CALORIES IN ADULT: Status: ACTIVE | Noted: 2019-04-07

## 2019-04-07 LAB
ANION GAP SERPL CALC-SCNC: 7 MMOL/L (ref 8–16)
AORTIC ROOT ANNULUS: 3.02 CM
AORTIC VALVE CUSP SEPERATION: 2.21 CM
ASCENDING AORTA: 2.41 CM
AV INDEX (PROSTH): 0.77
AV MEAN GRADIENT: 7.39 MMHG
AV PEAK GRADIENT: 13.69 MMHG
AV VALVE AREA: 2.67 CM2
AV VELOCITY RATIO: 0.71
BSA FOR ECHO PROCEDURE: 2.59 M2
BUN SERPL-MCNC: 14 MG/DL (ref 8–23)
CALCIUM SERPL-MCNC: 8.9 MG/DL (ref 8.7–10.5)
CHLORIDE SERPL-SCNC: 103 MMOL/L (ref 95–110)
CO2 SERPL-SCNC: 31 MMOL/L (ref 23–29)
CREAT SERPL-MCNC: 0.9 MG/DL (ref 0.5–1.4)
CV ECHO LV RWT: 0.53 CM
DOP CALC AO PEAK VEL: 1.85 M/S
DOP CALC AO VTI: 34.93 CM
DOP CALC LVOT AREA: 3.46 CM2
DOP CALC LVOT DIAMETER: 2.1 CM
DOP CALC LVOT PEAK VEL: 1.31 M/S
DOP CALC LVOT STROKE VOLUME: 93.12 CM3
DOP CALCLVOT PEAK VEL VTI: 26.9 CM
E WAVE DECELERATION TIME: 198 MSEC
E/A RATIO: 1.08
E/E' RATIO: 9.06
ECHO LV POSTERIOR WALL: 1.21 CM (ref 0.6–1.1)
EST. GFR  (AFRICAN AMERICAN): >60 ML/MIN/1.73 M^2
EST. GFR  (NON AFRICAN AMERICAN): >60 ML/MIN/1.73 M^2
ESTIMATED AVG GLUCOSE: 100 MG/DL (ref 68–131)
FRACTIONAL SHORTENING: 31 % (ref 28–44)
GLUCOSE SERPL-MCNC: 91 MG/DL (ref 70–110)
HBA1C MFR BLD HPLC: 5.1 % (ref 4–5.6)
INTERVENTRICULAR SEPTUM: 1.33 CM (ref 0.6–1.1)
IVRT: 0.11 MSEC
LA MAJOR: 4.51 CM
LA MINOR: 4.07 CM
LA WIDTH: 3.86 CM
LEFT ATRIUM SIZE: 3.58 CM
LEFT ATRIUM VOLUME INDEX: 20.7 ML/M2
LEFT ATRIUM VOLUME: 50.26 CM3
LEFT INTERNAL DIMENSION IN SYSTOLE: 3.13 CM (ref 2.1–4)
LEFT VENTRICLE DIASTOLIC VOLUME INDEX: 39.11 ML/M2
LEFT VENTRICLE DIASTOLIC VOLUME: 95.13 ML
LEFT VENTRICLE MASS INDEX: 90.2 G/M2
LEFT VENTRICLE SYSTOLIC VOLUME INDEX: 15.9 ML/M2
LEFT VENTRICLE SYSTOLIC VOLUME: 38.67 ML
LEFT VENTRICULAR INTERNAL DIMENSION IN DIASTOLE: 4.56 CM (ref 3.5–6)
LEFT VENTRICULAR MASS: 219.49 G
LV LATERAL E/E' RATIO: 7
LV SEPTAL E/E' RATIO: 12.83
MV PEAK A VEL: 0.71 M/S
MV PEAK E VEL: 0.77 M/S
PISA TR MAX VEL: 2.26 M/S
POTASSIUM SERPL-SCNC: 3.7 MMOL/L (ref 3.5–5.1)
PULM VEIN S/D RATIO: 1.81
PV PEAK D VEL: 0.42 M/S
PV PEAK S VEL: 0.76 M/S
PV PEAK VELOCITY: 1.32 CM/S
RA MAJOR: 4.14 CM
RA PRESSURE: 3 MMHG
RA WIDTH: 4.22 CM
RIGHT VENTRICULAR END-DIASTOLIC DIMENSION: 3.94 CM
RV TISSUE DOPPLER FREE WALL SYSTOLIC VELOCITY 1 (APICAL 4 CHAMBER VIEW): 12.69 M/S
SINUS: 2.56 CM
SODIUM SERPL-SCNC: 141 MMOL/L (ref 136–145)
STJ: 2.16 CM
TDI LATERAL: 0.11
TDI SEPTAL: 0.06
TDI: 0.09
TR MAX PG: 20.43 MMHG
TRICUSPID ANNULAR PLANE SYSTOLIC EXCURSION: 2.31 CM
TV REST PULMONARY ARTERY PRESSURE: 23 MMHG
VIT B12 SERPL-MCNC: 390 PG/ML (ref 210–950)

## 2019-04-07 PROCEDURE — 99214 PR OFFICE/OUTPT VISIT, EST, LEVL IV, 30-39 MIN: ICD-10-PCS | Mod: ,,, | Performed by: PSYCHIATRY & NEUROLOGY

## 2019-04-07 PROCEDURE — 80048 BASIC METABOLIC PNL TOTAL CA: CPT

## 2019-04-07 PROCEDURE — 97165 OT EVAL LOW COMPLEX 30 MIN: CPT

## 2019-04-07 PROCEDURE — 36415 COLL VENOUS BLD VENIPUNCTURE: CPT

## 2019-04-07 PROCEDURE — 99214 OFFICE O/P EST MOD 30 MIN: CPT | Mod: ,,, | Performed by: PSYCHIATRY & NEUROLOGY

## 2019-04-07 PROCEDURE — 25000003 PHARM REV CODE 250: Performed by: NURSE PRACTITIONER

## 2019-04-07 PROCEDURE — 25000003 PHARM REV CODE 250: Performed by: EMERGENCY MEDICINE

## 2019-04-07 PROCEDURE — G0378 HOSPITAL OBSERVATION PER HR: HCPCS

## 2019-04-07 PROCEDURE — 25000003 PHARM REV CODE 250: Performed by: PHYSICIAN ASSISTANT

## 2019-04-07 PROCEDURE — 96374 THER/PROPH/DIAG INJ IV PUSH: CPT | Performed by: EMERGENCY MEDICINE

## 2019-04-07 PROCEDURE — 97162 PT EVAL MOD COMPLEX 30 MIN: CPT | Performed by: PHYSICAL THERAPIST

## 2019-04-07 PROCEDURE — 63600175 PHARM REV CODE 636 W HCPCS: Performed by: NURSE PRACTITIONER

## 2019-04-07 PROCEDURE — 94761 N-INVAS EAR/PLS OXIMETRY MLT: CPT

## 2019-04-07 RX ORDER — HYDROCHLOROTHIAZIDE 25 MG/1
25 TABLET ORAL DAILY
Qty: 30 TABLET | Refills: 11 | Status: SHIPPED | OUTPATIENT
Start: 2019-04-07 | End: 2020-04-06

## 2019-04-07 RX ORDER — HYDROCHLOROTHIAZIDE 25 MG/1
25 TABLET ORAL DAILY
Status: DISCONTINUED | OUTPATIENT
Start: 2019-04-07 | End: 2019-04-07 | Stop reason: HOSPADM

## 2019-04-07 RX ORDER — ATORVASTATIN CALCIUM 10 MG/1
20 TABLET, FILM COATED ORAL DAILY
Status: DISCONTINUED | OUTPATIENT
Start: 2019-04-07 | End: 2019-04-07 | Stop reason: HOSPADM

## 2019-04-07 RX ORDER — ASPIRIN 81 MG/1
81 TABLET ORAL DAILY
Refills: 0 | COMMUNITY
Start: 2019-04-07 | End: 2020-04-06

## 2019-04-07 RX ORDER — AMLODIPINE BESYLATE 5 MG/1
5 TABLET ORAL DAILY
Status: DISCONTINUED | OUTPATIENT
Start: 2019-04-07 | End: 2019-04-07 | Stop reason: HOSPADM

## 2019-04-07 RX ORDER — ATORVASTATIN CALCIUM 20 MG/1
20 TABLET, FILM COATED ORAL DAILY
Qty: 90 TABLET | Refills: 3 | Status: SHIPPED | OUTPATIENT
Start: 2019-04-08 | End: 2020-04-07

## 2019-04-07 RX ORDER — LORAZEPAM 2 MG/ML
2 INJECTION INTRAMUSCULAR DAILY PRN
Status: COMPLETED | OUTPATIENT
Start: 2019-04-07 | End: 2019-04-07

## 2019-04-07 RX ORDER — AMLODIPINE BESYLATE 5 MG/1
5 TABLET ORAL DAILY
Qty: 30 TABLET | Refills: 11 | Status: SHIPPED | OUTPATIENT
Start: 2019-04-07 | End: 2019-09-30 | Stop reason: ALTCHOICE

## 2019-04-07 RX ADMIN — DIPHENHYDRAMINE HYDROCHLORIDE 25 MG: 25 CAPSULE ORAL at 06:04

## 2019-04-07 RX ADMIN — FAMOTIDINE 20 MG: 20 TABLET ORAL at 09:04

## 2019-04-07 RX ADMIN — LORAZEPAM 2 MG: 2 INJECTION INTRAMUSCULAR; INTRAVENOUS at 09:04

## 2019-04-07 RX ADMIN — ATORVASTATIN CALCIUM 20 MG: 10 TABLET, FILM COATED ORAL at 09:04

## 2019-04-07 RX ADMIN — HYDROCHLOROTHIAZIDE 25 MG: 25 TABLET ORAL at 11:04

## 2019-04-07 RX ADMIN — DIPHENHYDRAMINE HYDROCHLORIDE 25 MG: 25 CAPSULE ORAL at 03:04

## 2019-04-07 RX ADMIN — AMLODIPINE BESYLATE 5 MG: 5 TABLET ORAL at 11:04

## 2019-04-07 RX ADMIN — ASPIRIN 325 MG ORAL TABLET 325 MG: 325 PILL ORAL at 09:04

## 2019-04-07 RX ADMIN — ACETAMINOPHEN 500 MG: 500 TABLET, FILM COATED ORAL at 09:04

## 2019-04-07 RX ADMIN — FERROUS GLUCONATE TAB 324 MG (37.5 MG ELEMENTAL IRON) 324 MG: 324 (37.5 FE) TAB at 09:04

## 2019-04-07 NOTE — PLAN OF CARE
Problem: Physical Therapy Goal  Goal: Physical Therapy Goal  Goals to be met by: 2019     Patient will increase functional independence with mobility by performin. Supine to sit with Modified [place]  2. Sit to supine with Modified [place]  3. Sit to stand transfer with [place]  4. Gait  x 300 feet with Modified [place] using Rolling Walker.     Outcome: Ongoing (interventions implemented as appropriate)  Huang Tai, PT  2019

## 2019-04-07 NOTE — NURSING
Discharge instructions given to patient at bedside. Teachback method used for details about new medications and followup appointments. New rolling walker is at bedsidefor pt to take home. Instructions given about how to adjust height and fold up the walker. Patient verbalized understanding of instructions. Patient states willingness to comply. Saline lock removed. Tele monitoring removed Pt called  Luis Miguel and states that he's on his way to get her.

## 2019-04-07 NOTE — PT/OT/SLP EVAL
Physical Therapy Evaluation    Patient Name:  Santa Plascencia   MRN:  [id number]    Recommendations:     Discharge Recommendations:  other (see comments)(- HHPT at time of discharge. )   Discharge Equipment Recommendations: shower chair, walker, rolling   Barriers to discharge: Decreased caregiver support    Assessment:     Santa Plascencia is a 64 y.o. female admitted with a medical diagnosis of Right sided numbness.  She presents with the following impairments/functional limitations:  weakness, impaired endurance, impaired functional mobilty, gait instability, impaired balance, decreased coordination, decreased safety awareness, decreased lower extremity function, pain, impaired cardiopulmonary response to activity, edema.    Rehab Prognosis: Good; patient would benefit from acute skilled PT services to address these deficits and reach maximum level of function.    Recent Surgery: * No surgery found *      Plan:     During this hospitalization, patient to be seen 5 x/week to address the identified rehab impairments via gait training, therapeutic activities, therapeutic exercises and progress toward the following goals:    · Plan of Care Expires:  04/20/19    Subjective     Chief Complaint: R foot pain with weight bearing activities.   Patient/Family Comments/goals: to return to PLOF    Pain/Comfort:  · Pain Rating 1: 8/10  · Location - Side 1: Right  · Location - Orientation 1: lateral  · Location 1: face  · Pain Addressed 1: Nurse notified  · Pain Rating 2: 8/10  · Location - Side 2: Right  · Location - Orientation 2: anterior  · Location 2: foot  · Pain Addressed 2: Cessation of Activity    Patients cultural, spiritual, Methodist conflicts given the current situation:      Living Environment:  Pt lives with dependent grand kids in a 2 Story Home with 2 AURORA and 14 to get to the Second Floor with Left sided Hand Rail.  Pt spouse doesn't live with her, but assist from time-to-time.    Prior to admission, patients level  of function was Mod I.  Equipment used at home: none.  DME owned (not currently used): single point cane.  Upon discharge, patient will have assistance from spouse (at times) and self.    Objective:     Communicated with Nurse prior to session.  Patient found supine with peripheral IV, telemetry, oxygen(- 2.0 Lpm of O2 via [place] ')  upon PT entry to room.    General Precautions: Standard, fall   Orthopedic Precautions:Full weight bearing   Braces: N/A     Exams:  · Cognitive Exam:  Patient is oriented to Person, Place and Situation  · Gross Motor Coordination:  WFL  · Postural Exam:  Patient presented with the following abnormalities:    · -       Rounded shoulders  · -       Forward head  · -       Abnormal trunk flexion  · Skin Integrity/Edema:      · -       Skin integrity: Visible skin intact  · -       Edema: Mild R-sided facial  · RLE ROM: WFL  · RLE Strength: Deficits: R Quad = 4+/5  · LLE ROM: WFL  · LLE Strength: WFL    Functional Mobility:  · Bed Mobility:     · Rolling Left:  minimum assistance  · Rolling Right: minimum assistance  · Supine to Sit: minimum assistance  · Sit to Supine: minimum assistance  · Transfers:     · Sit to Stand:  stand by assistance with rolling walker with forward trunk momentum to get COG within JIAN  · Gait: 45' with RW and CGA/SBA with continuous VC's for increase step length, increase ashwini, and RW positioning to increase safety and balance with weight bearing task.  Pt requested return to sitting EOB 2* fatigue with task.      AM-PAC 6 CLICK MOBILITY  Total Score:18     Patient left supine with all lines intact, call button in reach and Nurse present.    GOALS:   Multidisciplinary Problems     Physical Therapy Goals        Problem: Physical Therapy Goal    Goal Priority Disciplines Outcome Goal Variances Interventions   Physical Therapy Goal     PT, PT/OT Ongoing (interventions implemented as appropriate)     Description:  Goals to be met by: 04/20/2019     Patient will  increase functional independence with mobility by performin. Supine to sit with Modified [place]  2. Sit to supine with Modified [place]  3. Sit to stand transfer with [place]  4. Gait  x 300 feet with Modified [place] using Rolling Walker.                       History:     Past Medical History:   Diagnosis Date    Asthma     Bronchitis     Hypertension        Past Surgical History:   Procedure Laterality Date    ANKLE SURGERY       SECTION      CHOLECYSTECTOMY         Time Tracking:     PT Received On: 19  PT Start Time: 0900     PT Stop Time: 0930  PT Total Time (min): 30 min     Billable Minutes: Evaluation 30 min      Huang Tai, PT  2019

## 2019-04-07 NOTE — PLAN OF CARE
04/07/19 1008   Discharge Assessment   Assessment Type Discharge Planning Assessment   To patient's room to complete DC needs assessment.  Patient off unit for testing.  TN to follow up at a later time.

## 2019-04-07 NOTE — NURSING
Received patient from ER to room via stretcher. Patient accompanied by transport and  Luis Miguel. Pt stood from stretcher, turned and sat edge of bed one assist.  Evaluated general patient appearance and condition. Admit assessment initiated. Noted Rt side cheek/jaw swollen, pink, and warm to touch. Pt states Rt jaw hurts to the touch. Tele monitoring initiated. Saline lock at RAC flushed easily, intact. No apparent distress noted at this time. Dinner delivered to pt, pt swallowing easily but states it's difficult to chew on Rt side due to pain.

## 2019-04-07 NOTE — NURSING
"Upon walking to assist pt in MRI, pt now on ED stretcher being assisted to unit by transport and MRI tech Sully. Pt appears to have swelling with drooping to right side of mouth. However pt appears calm and quiet without any shaking." Sully reiterated the events which transpired while trying to perform the MRI. Assisted transport to unit and back into bed. Pt is on 2LNC of oxygen and appears to be in no distress. Pt is requesting she be handed her phone and glasses to call her . Will continue to monitor pt closely.   "

## 2019-04-07 NOTE — PLAN OF CARE
"To patient's room to discuss patient managing her care at home.      TN Role Explained.  Patient identified by using 2 identifiers:  Name and date of birth    Patient stated that her grandchildren WILL HELP AT HOME WITH her RECOVERY.       TN reviewed with patient contents of "Britely Packet".      TN name and contact info placed on the communication     Preferred appointment time:     04/07/19 1218   Discharge Assessment   Assessment Type Discharge Planning Assessment   Confirmed/corrected address and phone number on facesheet? Yes   Assessment information obtained from? Patient   Expected Length of Stay (days)   (discharged)   Communicated expected length of stay with patient/caregiver yes   Prior to hospitilization cognitive status: Alert/Oriented   Prior to hospitalization functional status: Independent   Current cognitive status: Alert/Oriented   Current Functional Status: Needs Assistance;Assistive Equipment   Lives With grandchild(rick)   Able to Return to Prior Arrangements yes   Is patient able to care for self after discharge? Unable to determine at this time (comments)   Who are your caregiver(s) and their phone number(s)? Grandchildren will help at home   Patient's perception of discharge disposition home health   Readmission Within the Last 30 Days no previous admission in last 30 days   Patient currently being followed by outpatient case management? No   Patient currently receives any other outside agency services? No   Equipment Currently Used at Home none   Do you have any problems affording any of your prescribed medications? No   Is the patient taking medications as prescribed? yes   Does the patient have transportation home? Yes   Transportation Anticipated family or friend will provide   Does the patient receive services at the [facility]? No   Discharge Plan A Home with family   DME Needed Upon Discharge  none   Patient/Family in Agreement with Plan yes     "

## 2019-04-07 NOTE — PT/OT/SLP EVAL
"Occupational Therapy   Evaluation    Name: Santa Plascencia  MRN: [id number]  Admitting Diagnosis:  Right sided numbness      Recommendations:     Discharge Recommendations: home health OT(with 24 hour assistance)  Discharge Equipment Recommendations:  walker, rolling  Barriers to discharge:  None    Assessment:     Santa Plascencia is a 64 y.o. female with a medical diagnosis of Right sided numbness.  She reported that she still feels "woozy" from the medication prior to her MRI this morning. Pt presents with strong family support. Performance deficits affecting function: weakness, impaired functional mobilty, decreased safety awareness, impaired coordination, impaired endurance, impaired balance, decreased upper extremity function, impaired self care skills, decreased lower extremity function.      Rehab Prognosis: Good; patient would benefit from acute skilled OT services to address these deficits and reach maximum level of function.       Plan:     Patient to be seen 5 x/week to address the above listed problems via self-care/home management, therapeutic activities, therapeutic exercises, neuromuscular re-education  · Plan of Care Expires: 04/21/19  · Plan of Care Reviewed with: patient    Subjective     Chief Complaint: feeling "woozy" and that her medication has not worn off from this morning, affecting her balance   Patient/Family Comments/goals: to get better    Occupational Profile:  Living Environment: Pt lives with grandkids in a 2 Story Home with 2 AURORA and 14 to get to the Second Floor with Left sided Hand Rail.  Pt spouse doesn't live with her, but assist from time-to-time.    Previous level of function: Pt was mod I prior to admission. Pt reported using a str cane intermittently d/t pain in her R leg. She reported that her doctor has been following this pain and ruled out gout.   Roles and Routines: self- care, family  Equipment Used at Home:  cane, straight  Assistance upon Discharge: spouse (at times), " anuradha    Pain/Comfort:  · Pain Rating 1: 0/10    Patients cultural, spiritual, Rastafari conflicts given the current situation: no    Objective:     Communicated with: NurseAnai prior to session.  Patient found HOB elevated with peripheral IV, telemetry upon OT entry to room.    General Precautions: Standard, fall   Orthopedic Precautions:N/A   Braces: N/A     Occupational Performance:    Bed Mobility:    · Patient completed Rolling/Turning to Left with  stand by assistance and with side rail  · Patient completed Rolling/Turning to Right with stand by assistance and with side rail  · Patient completed Scooting/Bridging with contact guard assistance and with side rail  · Patient completed Supine to Sit with contact guard assistance and with side rail  · Patient completed Sit to Supine with contact guard assistance and with side rail    Functional Mobility/Transfers:  · Patient completed Sit <> Stand Transfer with minimum assistance and of 2 persons  with  rolling walker   · Functional Mobility: 2 people present for MIN A in sit to stand d/t pt feeling off-balanced and woozy from medication for safety. Pt required 2 attempts for sit to stand with mod verbal cueing for feet and hand placement. Pt took 4-5 side steps at EOB with CGA using RW. Pt required min VC for proper body mechanics while standing.      Activities of Daily Living:  · Feeding:  modified independence    · Grooming: supervision seated EOB  · Lower Body Dressing: Pt reported that she typically does not wear socks. Pt reported that she stands at home to dress upper and lower body; OT rec. to do dressing seated for safety and she agreed. Pt practiced threading pillow case through leg to demo pant dressing and she demo'd with standby assist      Cognitive/Visual Perceptual:  Cognitive/Psychosocial Skills:     -       Oriented to: Person, Place and Time   -       Follows Commands/attention:Follows two-step commands  -       Communication:  clear/fluent  -       Memory: No Deficits noted  -       Safety awareness/insight to disability: impaired   -       Mood/Affect/Coping skills/emotional control: Appropriate to situation and Pleasant  Visual/Perceptual:      -Intact      Physical Exam:  Balance:    -       CGA while taking side steps d/t her safety concern because she reported her medication has made her feel woozy.   Postural examination/scapula alignment:    -       Rounded shoulders  -       Forward head  Skin integrity: Visible skin intact  Edema:  None noted  Sensation:    -       Intact  Dominant hand:    -       R hand  Upper Extremity Range of Motion:     -       Right Upper Extremity: WFL  -       Left Upper Extremity: WFL  Upper Extremity Strength:    -       Right Upper Extremity: WFL  -       Left Upper Extremity: WFL   Strength:    -       Right Upper Extremity: WFL  -       Left Upper Extremity: WFL  Fine Motor Coordination:    -       Impaired  Right hand, manipulation of objects    Gross motor coordination:   WFL    AMPAC 6 Click ADL:  AMPAC Total Score: 20    Treatment & Education:  Pt consented to OT eval. Pt and OT discussed bathroom set-up. Pt reported that she has a raised toilet seat with no concerns for getting on/off the commode at home. Pt reports that she has a step-in tub/shower combo and her  will be available to help with showers if needed and/or she can sit on the edge of the tub instead of standing. Pt educated on safety with dressing, e.g. To sit instead of stand for safety. Pt educated on hand placement with sit to stand transfer using a RW.   Education:    Patient left HOB elevated with all lines intact, call button in reach, bed alarm on and Anai notified    GOALS:   Multidisciplinary Problems     Occupational Therapy Goals        Problem: Occupational Therapy Goal    Goal Priority Disciplines Outcome Interventions   Occupational Therapy Goal     OT, PT/OT Ongoing (interventions implemented as  appropriate)    Description:  Goals to be met by: 19     Patient will increase functional independence with ADLs by performing:    LE Dressing with Modified [place].  Grooming while standing at sink with Modified [place].  Toileting from toilet with Modified [place] for hygiene and clothing management.   Supine to sit with Modified [place].  Step transfer with Modified [place]  Toilet transfer to toilet with Modified [place].                      History:     Past Medical History:   Diagnosis Date    Asthma     Bronchitis     Hypertension        Past Surgical History:   Procedure Laterality Date    ANKLE SURGERY       SECTION      CHOLECYSTECTOMY         Time Tracking:     OT Date of Treatment: 19  OT Start Time: 1400  OT Stop Time: 1420  OT Total Time (min): 20 min    Billable Minutes:Evaluation 20 min    Kathy Martinez OT  2019

## 2019-04-07 NOTE — ASSESSMENT & PLAN NOTE
Symptoms improving now. CT without acute abnormality. Stroke neuro recommends MRI.   MRI. Passed bedside nurse swallow study. TSH 1.3.    4/7/19  Symptoms improving slowly. US carotid left distal ICA 50-69% stenosis.   Continue permissive HTN  MRI brain this am.   PT/OT/SP consult  Starting Aspirin and statin ()  Neuro checks, aspiration precautions  Neurology consult   Vascular Surgery consult

## 2019-04-07 NOTE — NURSING
Patient escorted by RN to family vehicle for discharge home. Patient accompanied by  Luis Miguel. New rolling walker placed in car for home use. No apparent distress noted.

## 2019-04-07 NOTE — PT/OT/SLP PROGRESS
Occupational Therapy      Patient Name:  Santa Plascencia   MRN:  [id number]    Patient not seen today secondary to CT/MRI(Transport was present, taking pt off unit for MRI upon arrival.). Will follow-up later as able.    Kathy Martinez, OT  4/7/2019

## 2019-04-07 NOTE — ASSESSMENT & PLAN NOTE
Area of right lower lip swelling. No involvement of posterior oropharynx, tongue, or sublingual space. Likely due to lisinopril. Last took almost 24 hrs ago,  reports improving now. Afebrile without leukocytosis.  Will start pepcid/benadryl  D/c lisinopril  Aspiration precautions

## 2019-04-07 NOTE — PROGRESS NOTES
"[facility] Hospital Medicine  Progress Note    Patient Name: Santa Plascencia  MRN: [id number]  Patient Class: OP- Observation   Admission Date: 4/6/2019  Length of Stay: 0 days  Attending Physician: Leticia Aleman MD  Primary Care Provider: Joao Wilcox Jr, MD        Subjective:     Principal Problem:Right sided numbness    HPI:  Santa Plascencia 64 y.o. female with HTN presents to the hospital with a chief complaint of right sided weakness. She reports she awoke this morning with right sided facial/arm numbness. This numbness is improving now, but still present. She also felt as though her right arm was weak as well. She denies any vision changes. She thought she may have had a "little headache" this morning, but it resolved on its own. She also awoke this morning with right lower lip swelling. Her  reports it has always been localized to the right lower lip. It is improving now the  reports. She has never had difficulty talking or swallowing per the  and patient. She denies any numbness or weakness legs, but reports she occasionally has sharp pains in her feet. She denies fever, SOB, difficulty swallowing, incontinence, and numbness to the legs, and slurred speech. She endorses right sided weakness/numbness and facial swelling. She takes lisinopril for BP and has taken for many years. She last took lisinopril last night.     In the ED, CT head without acute abnormality, chest x-ray without acute process, EKG of NSR, Tele stroke consulted and recommended MRI.     Hospital Course:  Santa Plascencia 64 y.o. female admitted to observation for right sided weakness, right facial numbness and right lip/tongue swelling due to angioedema. In the ED, CT head without acute abnormality, chest x-ray without acute process, EKG of NSR, Tele stroke consulted and recommended MRI. Angioedema felt due to lisinopril-now listed as allergy.  US carotid shwoed 50-69% luminal narrowing of left distal " ICA. Patient refused MRI. On 4/7/2019, Angioedema improved with H1, H2. Right sided weakness and facial numbness improved overnight but not at baseline. Patient agreed for MRI with ativan prior this am. Neurology consult pending.     Interval History: See above hospital course.     Review of Systems   Constitutional: Negative for chills and fever.   HENT: Negative for nosebleeds and tinnitus.    Eyes: Negative for photophobia and visual disturbance.   Respiratory: Negative for shortness of breath and wheezing.    Cardiovascular: Negative for chest pain, palpitations and leg swelling.   Gastrointestinal: Negative for abdominal distention, nausea and vomiting.   Genitourinary: Negative for dysuria, flank pain and hematuria.   Musculoskeletal: Negative for gait problem and joint swelling.   Skin: Negative for rash and wound.   Neurological: Positive for facial asymmetry (facial swelling), weakness (all right sided) and numbness (all right sided). Negative for seizures and syncope.        Improving.      Objective:     Vital Signs (Most Recent):  Temp: 98.5 °F (36.9 °C) (04/07/19 0748)  Pulse: 76 (04/07/19 0748)  Resp: 18 (04/07/19 0748)  BP: (!) 152/69 (04/07/19 0748)  SpO2: 99 % (04/07/19 0748) Vital Signs (24h Range):  Temp:  [98.5 °F (36.9 °C)-98.9 °F (37.2 °C)] 98.5 °F (36.9 °C)  Pulse:  [] 76  Resp:  [14-19] 18  SpO2:  [98 %-100 %] 99 %  BP: (132-182)/(62-99) 152/69     Weight: (!) 144.1 kg (317 lb 10.9 oz)  Body mass index is 51.28 kg/m².    Intake/Output Summary (Last 24 hours) at 4/7/2019 0826  Last data filed at 4/6/2019 1905  Gross per 24 hour   Intake --   Output 200 ml   Net -200 ml      Physical Exam   Constitutional: She is oriented to person, place, and time. She appears well-developed and well-nourished. No distress.   HENT:   Head: Normocephalic and atraumatic.   Right Ear: External ear normal.   Left Ear: External ear normal.   Swelling and tenderness to right lower lip, stops at midline. No  tenderness of any teeth, no swelling to sublingual space or lower mandible, no posterior oropharynx swelling. Tolerating secretions, no difficulty speaking   Eyes: Conjunctivae and EOM are normal. Right eye exhibits no discharge. Left eye exhibits no discharge.   Neck: Normal range of motion. No thyromegaly present.   Cardiovascular: Normal rate and regular rhythm.   No murmur heard.  Pulmonary/Chest: Effort normal and breath sounds normal. No respiratory distress.   Abdominal: Soft. Bowel sounds are normal. She exhibits no distension and no mass. There is no tenderness.   Musculoskeletal: She exhibits no edema, tenderness or deformity.   Neurological: She is alert and oriented to person, place, and time.   5/5 strength lower extremities, able to hold both legs for 5 seconds off stretch. Finger to thumb intact. 5/5 strength left UE.   Unable to lift right UE off of stretcher-improved today. When upper extremity is lifted patient able to hold a loft for 5 seconds.   3/5 strength with bending the right elbow  No facial droop   Skin: Skin is warm and dry.   Psychiatric: She has a normal mood and affect. Her behavior is normal.   Nursing note and vitals reviewed.      Significant Labs: All pertinent labs within the past 24 hours have been reviewed.    Significant Imaging: I have reviewed and interpreted all pertinent imaging results/findings within the past 24 hours.    Assessment/Plan:      * Right sided and facial numbness, Concern for Acute Stroke   Symptoms improving now. CT without acute abnormality. Stroke neuro recommends MRI.   MRI. Passed bedside nurse swallow study. TSH 1.3.      4/7/19  Symptoms improving slowly. US carotid left distal ICA 50-69% stenosis. EKG NSR. Tele NSR 80's. No history atrial fib.   Continue permissive HTN  MRI brain this am.   2 D echo  PT/OT/SP consult  Starting Aspirin and statin ()  Vitamin B 12 pending  Neuro checks, aspiration precautions, tele  Neurology consult   Vascular  Surgery consult       Class 3 severe obesity due to excess calories in adult  Encourage weight lost and exercise.       Hyperlipidemia  Continue statin (new).       HTN (hypertension)  Holding lisinopril/hctz. Allow permissive htn. See above #1.     Angioedema  Area of right lower lip swelling. No involvement of posterior oropharynx, tongue, or sublingual space. Likely due to lisinopril. Last took almost 24 hrs ago,  reports improving now. Afebrile without leukocytosis.    4/7/19  Continue pepcid/benadryl  D/c lisinopril-listed as allergy  Aspiration precautions    Weakness of right upper extremity  See above        VTE Risk Mitigation (From admission, onward)        Ordered     enoxaparin injection 40 mg  Daily      04/06/19 1747     IP VTE HIGH RISK PATIENT  Once      04/06/19 1747              Sabrina Najera NP  Department of Hospital Medicine   [facility]

## 2019-04-07 NOTE — NURSING
Patient to MRI as ordered via bed 2L O2 nc, one time dose ativan 2mg iv given 0941. Patient now drowsy dropping off to sleep. No apparent distress noted.

## 2019-04-07 NOTE — NURSING
Pt ambulated to bathroom one standby assist, pt holding onto bed and grab bars in bathroom, then to WC. Patient to US and MRI via WC. Patient awake, alert, oriented with mild discomfort to Rt jaw/cheek.  No apparent distress noted.

## 2019-04-07 NOTE — CONSULTS
"[facility]  Neurology  Consult Note    Patient Name: Santa Plascencia  MRN: [id number]  Admission Date: 2019  Hospital Length of Stay: 0 days  Code Status: Full Code   Attending Provider: Leticia Aleman MD   Consulting Provider: Lenny Brian MD  Primary Care Physician: Joao Wilcox Jr, MD  Principal Problem:Right sided numbness    Inpatient consult to Neurology  Consult performed by: Lenny Brian MD  Consult ordered by: Ochoa Kenny MD        Subjective:     Chief Complaint: "My right arm was weak"     HPI: 65 y/o female with medical Hx as listed below comes to ED for facial swelling. Pt noted that her lower lip was swollen which progressed to severe facial swelling on the right side. Pt given diphenhydramine in ED slowly improving her swelling. Ms. Plascencia states that her concern began when she was not able to lift her right arm. She denies headaches, visual disturbances, weakness or numbness of other extremities. No previous episodes. Weakness resolved. No aggravating or alleviating factors.    Past Medical History:   Diagnosis Date    Asthma     Bronchitis     Hypertension        Past Surgical History:   Procedure Laterality Date    ANKLE SURGERY       SECTION      CHOLECYSTECTOMY         Review of patient's allergies indicates:   Allergen Reactions    Lisinopril Other (See Comments)     angioedema       Current Neurological Medications:     No current facility-administered medications on file prior to encounter.      Current Outpatient Medications on File Prior to Encounter   Medication Sig    albuterol (ACCUNEB) 0.63 mg/3 mL Nebu Take 0.63 mg by nebulization every 6 (six) hours as needed.    cyclobenzaprine (FEXMID) 7.5 MG Tab Take 10 mg by mouth 3 (three) times daily as needed.    ferrous gluconate (FERGON) 324 MG tablet Take 324 mg by mouth daily with breakfast.    FLUTICASONE/VILANTEROL (BREO ELLIPTA INHL) Inhale into the lungs.    [DISCONTINUED] " lisinopril-hydrochlorothiazide (PRINZIDE,ZESTORETIC) 20-25 mg Tab Take 1 tablet by mouth once daily.    fluticasone-salmeterol 250-50 mcg/dose (ADVAIR) 250-50 mcg/dose diskus inhaler Inhale 1 puff into the lungs 2 (two) times daily.      Family History     Problem Relation (Age of Onset)    Hypertension Mother    Stroke Mother        Tobacco Use    Smoking status: Never Smoker    Smokeless tobacco: Never Used   Substance and Sexual Activity    Alcohol use: No    Drug use: No    Sexual activity: Not on file     Review of Systems   Constitutional: Negative for fever.   HENT: Positive for facial swelling. Negative for trouble swallowing.    Eyes: Negative for photophobia.   Respiratory: Negative for shortness of breath.    Cardiovascular: Negative for chest pain.   Gastrointestinal: Negative for abdominal pain.   Genitourinary: Negative for dysuria.   Musculoskeletal: Negative for back pain.   Neurological: Negative for headaches.          Objective:     Vital Signs (Most Recent):  Temp: 98.5 °F (36.9 °C) (04/07/19 1140)  Pulse: 68 (04/07/19 1140)  Resp: 18 (04/07/19 1140)  BP: 133/62 (04/07/19 1140)  SpO2: 100 % (04/07/19 1140) Vital Signs (24h Range):  Temp:  [98.5 °F (36.9 °C)-98.9 °F (37.2 °C)] 98.5 °F (36.9 °C)  Pulse:  [] 68  Resp:  [14-19] 18  SpO2:  [95 %-100 %] 100 %  BP: (132-182)/(62-99) 133/62     Weight: (!) 143.8 kg (317 lb)  Body mass index is 51.17 kg/m².    Physical Exam   Constitutional: She is oriented to person, place, and time. No distress.   HENT:   Head: Normocephalic.   Eyes: Right eye exhibits no discharge. Left eye exhibits no discharge.   Neck: Normal range of motion.   Cardiovascular: Regular rhythm.   Pulmonary/Chest: Breath sounds normal.   Abdominal: Bowel sounds are normal.   Musculoskeletal: She exhibits no edema.   Neurological: She is oriented to person, place, and time. She has normal strength. She has a normal Finger-Nose-Finger Test and a normal Heel to [place] Test. Gait  normal.   Skin: She is not diaphoretic.   Psychiatric: Her speech is normal.       NEUROLOGICAL EXAMINATION:     MENTAL STATUS   Oriented to person, place, and time.   Speech: speech is normal   Level of consciousness: alert    CRANIAL NERVES     CN III, IV, VI   Right pupil: Size: 2 mm. Shape: regular. Reactivity: brisk. Consensual response: intact.   Left pupil: Size: 2 mm. Shape: regular. Reactivity: brisk. Consensual response: intact.   Nystagmus: none   Ophthalmoparesis: none  Conjugate gaze: present    CN V   Right facial sensation deficit: mandible  Left facial sensation deficit: none    CN VII   Right facial weakness: none  Left facial weakness: none    CN IX, X   Palate: symmetric    CN XI   Right trapezius strength: normal  Left trapezius strength: normal    CN XII   Tongue deviation: none    MOTOR EXAM     Strength   Strength 5/5 throughout.     SENSORY EXAM   Right arm light touch: normal  Left arm light touch: normal  Right leg light touch: normal  Left leg light touch: normal    GAIT AND COORDINATION     Gait  Gait: normal     Coordination   Finger to nose coordination: normal  Heel to shin coordination: normal    NIHSS: 0    Significant Labs:   CBC:   Recent Labs   Lab 04/06/19  1544   WBC 10.31   HGB 11.5*   HCT 36.2*        CMP:   Recent Labs   Lab 04/06/19  1544 04/07/19  0424   GLU 90 91    141   K 3.7 3.7    103   CO2 29 31*   BUN 16 14   CREATININE 0.9 0.9   CALCIUM 9.5 8.9   PROT 8.0  --    ALBUMIN 3.5  --    BILITOT 1.0  --    ALKPHOS 94  --    AST 15  --    ALT 11  --    ANIONGAP 9 7*   EGFRNONAA >60 >60         Significant Imaging:  MRI brain  Impression       1. No acute intracranial pathology.  2. Findings most likely on the basis of small vessel ischemic changes in a patient of this age.      Electronically signed by: Titi Greenfiled DO  Date: 04/07/2019  Time: 10:49          Carotid:  Impression       1.  No evidence of hemodynamically significant stenosis in the  right carotid system.    2.  Peak systolic velocity of 153 cm/sec in the left distal ICA, corresponding to approximately 50-69% luminal narrowing of the left distal ICA.    This report was flagged in Epic as abnormal.      Electronically signed by: Dalton Pantoja MD  Date: 04/06/2019  Time: 19:54         Assessment and Plan:     65 y/o female consulted for transient right sided weakness    1. Right sided weakness: no symptoms today. TIA? Pt has risk factors for TIA/stroke.   -ASA 81 mg daily.   -Statin.   -Vascular surgery follow up for left carotid stenosis. For now will start therapy to control risk factors.    Active Diagnoses:    Diagnosis Date Noted POA    PRINCIPAL PROBLEM:  Right sided and facial numbness, Concern for Acute Stroke  [R20.0] 04/06/2019 Yes    Hyperlipidemia [E78.5] 04/07/2019 No    Class 3 severe obesity due to excess calories in adult [E66.01] 04/07/2019 Yes    Weakness of right upper extremity [R29.898] 04/06/2019 Yes    Angioedema [T78.3XXA] 04/06/2019 Yes    HTN (hypertension) [I10] 04/06/2019 Yes      Problems Resolved During this Admission:       VTE Risk Mitigation (From admission, onward)        Ordered     enoxaparin injection 40 mg  Daily      04/06/19 1747     IP VTE HIGH RISK PATIENT  Once      04/06/19 1747          Thank you for your consult. I will follow-up with patient. Please contact us if you have any additional questions.    Lenny Brian MD  Neurology  [facility]

## 2019-04-07 NOTE — NURSING
Report received from MATTHEW Lozano RN. Pt assisted off unit to have a STAT MRI of the brain via wheelchair assisted by transport. Pt appears to be in no distress and denies having any pain. Ambulated to wheelchair independently. Awaiting pt's return back to the unit.

## 2019-04-07 NOTE — PLAN OF CARE
[facility]    HOME HEALTH ORDERS  FACE TO FACE ENCOUNTER    Patient Name: Santa Plascencia  YOB: 1955    PCP: Joao Wilcox Jr, MD   PCP Address: [facility]  PCP Phone Number: [phone]  PCP Fax: [phone]    Encounter Date: 04/07/2019    Admit to Home Health    Diagnoses:  Active Hospital Problems    Diagnosis  POA    *Right sided and facial numbness, Concern for Acute Stroke  [R20.0]  Yes     Priority: 1 - High    Hyperlipidemia [E78.5]  No    Class 3 severe obesity due to excess calories in adult [E66.01]  Yes    Weakness of right upper extremity [R29.898]  Yes    Angioedema [T78.3XXA]  Yes    HTN (hypertension) [I10]  Yes      Resolved Hospital Problems   No resolved problems to display.       No future appointments.  Follow-up Information     Joao Wilcox Jr, MD.    Specialty:  Family Medicine  Contact information:  [facility] [address]  [phone]                     I have seen and examined this patient face to face today. My clinical findings that support the need for the home health skilled services and home bound status are the following:  Weakness/numbness causing balance and gait disturbance due to Weakness/Debility making it taxing to leave home.  Requiring assistive device to leave home due to unsteady gait caused by  Weakness/Debility.    Allergies:  Review of patient's allergies indicates:   Allergen Reactions    Lisinopril Other (See Comments)     angioedema       Diet: cardiac diet    Activities: activity as tolerated    Nursing:   SN to complete comprehensive assessment including routine vital signs. Instruct on disease process and s/s of complications to report to MD. Review/verify medication list sent home with the patient at time of discharge  and instruct patient/caregiver as needed. Frequency may be adjusted depending on  start of care date.    Notify MD if SBP > 160 or < 90; DBP > 90 or < 50; HR > 120 or < 50; Temp > 101; Other:         CONSULTS:    Aide to provide assistance with personal care, ADLs, and vital signs.    MISCELLANEOUS CARE:  N/A    WOUND CARE ORDERS  n/a      Medications: Review discharge medications with patient and family and provide education.      Current Discharge Medication List      START taking these medications    Details   amLODIPine (NORVASC) 5 MG tablet Take 1 tablet (5 mg total) by mouth once daily.  Qty: 30 tablet, Refills: 11      aspirin (ECOTRIN) 81 MG EC tablet Take 1 tablet (81 mg total) by mouth once daily.  Refills: 0      atorvastatin (LIPITOR) 20 MG tablet Take 1 tablet (20 mg total) by mouth once daily.  Qty: 90 tablet, Refills: 3      hydroCHLOROthiazide (HYDRODIURIL) 25 MG tablet Take 1 tablet (25 mg total) by mouth once daily.  Qty: 30 tablet, Refills: 11         CONTINUE these medications which have NOT CHANGED    Details   albuterol (ACCUNEB) 0.63 mg/3 mL Nebu Take 0.63 mg by nebulization every 6 (six) hours as needed.      cyclobenzaprine (FEXMID) 7.5 MG Tab Take 10 mg by mouth 3 (three) times daily as needed.      ferrous gluconate (FERGON) 324 MG tablet Take 324 mg by mouth daily with breakfast.      FLUTICASONE/VILANTEROL (BREO ELLIPTA INHL) Inhale into the lungs.      fluticasone-salmeterol 250-50 mcg/dose (ADVAIR) 250-50 mcg/dose diskus inhaler Inhale 1 puff into the lungs 2 (two) times daily.         STOP taking these medications       lisinopril-hydrochlorothiazide (PRINZIDE,ZESTORETIC) 20-25 mg Tab Comments:   Reason for Stopping:               I certify that this patient is confined to her home and needs intermittent skilled nursing care.

## 2019-04-07 NOTE — NURSING
"Received call from radiology regarding pt refusing MRI and now having a panic attack. However, pt refused a sedative before having MRI administered. Radiology [facility] reports, "pt shaking and refusing to stand up to be placed into a wheelchair." Asking can nurse come to MRI to assist with calming pt, so will assist pt. YUKI Mccoy notified regarding pt's refusal and current panic attack.  "

## 2019-04-07 NOTE — SUBJECTIVE & OBJECTIVE
Interval History: See above hospital course.     Review of Systems   Constitutional: Negative for chills and fever.   HENT: Negative for nosebleeds and tinnitus.    Eyes: Negative for photophobia and visual disturbance.   Respiratory: Negative for shortness of breath and wheezing.    Cardiovascular: Negative for chest pain, palpitations and leg swelling.   Gastrointestinal: Negative for abdominal distention, nausea and vomiting.   Genitourinary: Negative for dysuria, flank pain and hematuria.   Musculoskeletal: Negative for gait problem and joint swelling.   Skin: Negative for rash and wound.   Neurological: Positive for facial asymmetry (facial swelling), weakness (all right sided) and numbness (all right sided). Negative for seizures and syncope.        Improving.      Objective:     Vital Signs (Most Recent):  Temp: 98.5 °F (36.9 °C) (04/07/19 0748)  Pulse: 76 (04/07/19 0748)  Resp: 18 (04/07/19 0748)  BP: (!) 152/69 (04/07/19 0748)  SpO2: 99 % (04/07/19 0748) Vital Signs (24h Range):  Temp:  [98.5 °F (36.9 °C)-98.9 °F (37.2 °C)] 98.5 °F (36.9 °C)  Pulse:  [] 76  Resp:  [14-19] 18  SpO2:  [98 %-100 %] 99 %  BP: (132-182)/(62-99) 152/69     Weight: (!) 144.1 kg (317 lb 10.9 oz)  Body mass index is 51.28 kg/m².    Intake/Output Summary (Last 24 hours) at 4/7/2019 0826  Last data filed at 4/6/2019 1905  Gross per 24 hour   Intake --   Output 200 ml   Net -200 ml      Physical Exam   Constitutional: She is oriented to person, place, and time. She appears well-developed and well-nourished. No distress.   HENT:   Head: Normocephalic and atraumatic.   Right Ear: External ear normal.   Left Ear: External ear normal.   Swelling and tenderness to right lower lip, stops at midline. No tenderness of any teeth, no swelling to sublingual space or lower mandible, no posterior oropharynx swelling. Tolerating secretions, no difficulty speaking   Eyes: Conjunctivae and EOM are normal. Right eye exhibits no discharge. Left  eye exhibits no discharge.   Neck: Normal range of motion. No thyromegaly present.   Cardiovascular: Normal rate and regular rhythm.   No murmur heard.  Pulmonary/Chest: Effort normal and breath sounds normal. No respiratory distress.   Abdominal: Soft. Bowel sounds are normal. She exhibits no distension and no mass. There is no tenderness.   Musculoskeletal: She exhibits no edema, tenderness or deformity.   Neurological: She is alert and oriented to person, place, and time.   5/5 strength lower extremities, able to hold both legs for 5 seconds off stretch. Finger to thumb intact. 5/5 strength left UE.   Unable to lift right UE off of stretcher-improved today. When upper extremity is lifted patient able to hold a loft for 5 seconds.   3/5 strength with bending the right elbow  No facial droop   Skin: Skin is warm and dry.   Psychiatric: She has a normal mood and affect. Her behavior is normal.   Nursing note and vitals reviewed.      Significant Labs: All pertinent labs within the past 24 hours have been reviewed.    Significant Imaging: I have reviewed and interpreted all pertinent imaging results/findings within the past 24 hours.

## 2019-04-07 NOTE — PLAN OF CARE
Problem: Occupational Therapy Goal  Goal: Occupational Therapy Goal  Goals to be met by: 04/21/19     Patient will increase functional independence with ADLs by performing:    LE Dressing with Modified [place].  Grooming while standing at sink with Modified [place].  Toileting from toilet with Modified [place] for hygiene and clothing management.   Supine to sit with Modified [place].  Step transfer with Modified [place]  Toilet transfer to toilet with Modified [place].    Outcome: Ongoing (interventions implemented as appropriate)  Pt will benefit from acute OT services. OT rec. HHOT with 24 hour assistance.

## 2019-04-07 NOTE — ASSESSMENT & PLAN NOTE
Area of right lower lip swelling. No involvement of posterior oropharynx, tongue, or sublingual space. Likely due to lisinopril. Last took almost 24 hrs ago,  reports improving now. Afebrile without leukocytosis.    4/7/19  Continue pepcid/benadryl  D/c lisinopril-listed as allergy  Aspiration precautions

## 2019-04-07 NOTE — PLAN OF CARE
Problem: Adult Inpatient Plan of Care  Goal: Plan of Care Review     04/06/19 2025   Plan of Care Review   Plan of Care Reviewed With patient   Pt remained free of falls during current shift. Reported having pain to face described as tightness and received prn tylenol. Pt refused MRI of brain, however will try to retest pt later in the AM. Neurology consulted and neuro checks are being performed Q4H. Plan of care and fall precautions reviewed with pt and verbalized understanding. Bed locked, lowered, SR up x2 and call light placed within reach.

## 2019-04-07 NOTE — PLAN OF CARE
04/07/19 1443   Final Note   Assessment Type Final Discharge Note   Anticipated Discharge Disposition Home-Health   What phone number can be called within the next 1-3 days to see how you are doing after discharge?   ([phone])   Hospital Follow Up  Appt(s) scheduled? Yes   Discharge plans and expectations educations in teach back method with documentation complete? Yes   Right Care Referral Info   Post Acute Recommendation Home-care   Referral Type    Facility Name [facility]

## 2019-04-07 NOTE — PLAN OF CARE
04/07/19 1200   Post-Acute Status   Post-Acute Authorization Home Health/Hospice   Home Health/Hospice Status Referrals Sent   Order received for HH.  Referral sent to [facility] via right care.  Awaiting call back.    1400:  No response in Right care.  TN called agency and left message.    1430:  Call back received from on call nurseDebra who accepted patient for services.  All information placed on AVS.    1200:  Order received for Heavy duty RW.   TN called Milo at [facility] who stated ok to pull from depot.      1230:  TN called Milo and notified him that no Heavy duty RW in depot.  Referral faxed to Milo at the [facility].  RW to be delivered within the next 2 hours.  NurseAnai notified.    1445:  NurseBetina notified that all CM needs are met.    EDUCATION:  Things You are responsible For To Manage Your Care At Home:  1.    Getting your prescriptions filled   2.    Taking your medications as directed, DO NOT MISS ANY DOSES!  3.    Going to your follow-up doctor appointment. This is important because it  allow the doctor to monitor your progress and determine if  any changes need to made to your treatment plan.  Call [facility] number for new or repeated problems / symptoms   Call 911 for CP and / or SOB   Patient agreed to all above

## 2019-04-08 NOTE — DISCHARGE SUMMARY
"[facility] Hospital Medicine  Discharge Summary      Patient Name: Santa Plascencia  MRN: [id number]  Admission Date: 4/6/2019  Hospital Length of Stay: 0 days  Discharge Date and Time: 4/7/19  Attending Physician: No att. providers found   Discharging Provider: Sabrina Najera NP  Primary Care Provider: Joao Wilcox Jr, MD      HPI:   Santa Plascencia 64 y.o. female with HTN presents to the hospital with a chief complaint of right sided weakness. She reports she awoke this morning with right sided facial/arm numbness. This numbness is improving now, but still present. She also felt as though her right arm was weak as well. She denies any vision changes. She thought she may have had a "little headache" this morning, but it resolved on its own. She also awoke this morning with right lower lip swelling. Her  reports it has always been localized to the right lower lip. It is improving now the  reports. She has never had difficulty talking or swallowing per the  and patient. She denies any numbness or weakness legs, but reports she occasionally has sharp pains in her feet. She denies fever, SOB, difficulty swallowing, incontinence, and numbness to the legs, and slurred speech. She endorses right sided weakness/numbness and facial swelling. She takes lisinopril for BP and has taken for many years. She last took lisinopril last night.     In the ED, CT head without acute abnormality, chest x-ray without acute process, EKG of NSR, Tele stroke consulted and recommended MRI.     * No surgery found *      Hospital Course:   Santa Plascencia 64 y.o. female admitted to observation for right sided weakness, right facial numbness and right lip/tongue swelling due to angioedema. In the ED, CT head without acute abnormality, chest x-ray without acute process, EKG of NSR, Tele stroke consulted and recommended MRI. Angioedema felt due to lisinopril-now listed as allergy.  US carotid shwoed " 50-69% luminal narrowing of left distal ICA. Patient refused MRI. On 4/7/2019, Angioedema improved with H1, H2. Right sided weakness and facial numbness improved overnight but not at baseline.  2D echo shows normal EF and diastolic function with no wall motion abnormality.  2D echo showed normal atrial size.  Patient agreed for MRI with ativan prior this am. MRI no acute stroke. Symptoms significantly improved throughout stay. Possible TIA as patient have risk factors. Instruct patient to continue ASA and statin (new).  Blood pressure better controlled with adding amlodipine and increase HCTZ.  Instruct patient need to follow up with Vascular Surgery for left carotid stenosis.  Due to insurance, patient will need to follow with PCP to obtain refer to vascular surgery and Neurology.  Discharge summary will be routed to PCP.  Patient stable for discharge home with home health.  Please see discharge medication list below.    Consults:   Consults (From admission, onward)        Status Ordering Provider     Inpatient consult to Neurology  Once     Provider:  Lenny Brian MD    Completed GURDEEP MUNROE     Inpatient consult to Telemedicine-Stroke  Once     Provider:  Luisito Sainz MD    Completed GURDEEP MUNROE            Final Active Diagnoses:    Diagnosis Date Noted POA    PRINCIPAL PROBLEM:  Right sided and facial numbness, Concern for Acute Stroke  [R20.0] 04/06/2019 Yes    Hyperlipidemia [E78.5] 04/07/2019 No    Class 3 severe obesity due to excess calories in adult [E66.01] 04/07/2019 Yes    Weakness of right upper extremity [R29.898] 04/06/2019 Yes    Angioedema [T78.3XXA] 04/06/2019 Yes    HTN (hypertension) [I10] 04/06/2019 Yes      Problems Resolved During this Admission:       Discharged Condition: good    Disposition: Home or Self Care    Follow Up:  Follow-up Information     Joao Wilcox Jr, MD. Schedule an appointment as soon as possible for a visit in 1 week.    Specialty:  Family  "Medicine  Why:  for Hospital Follow up  Contact information:  [address]  [address]  [facility] [address]  [phone]             Luis Miguel Lugo MD.    Specialties:  Vascular Surgery, Surgery  Why:  For vascular follow up.  Someone will call you with an appointment after receiving a referral from your primary MD.  Contact information:  [address]  [address]  [place] [address]  [phone]             [facility].    Specialties:  DME Provider, Home Health Services  Why:  Home Health  Contact information:  [address]  [address]  [place] [address]  [phone]                 Patient Instructions:      WALKER FOR HOME USE     Order Specific Question Answer Comments   Type of Walker: Adult (5'4"-6'6")    With wheels? Yes    Height: 5' 6" (1.676 m)    Weight: 143.8 kg (317 lb)    Length of need (1-99 months): 99    Does patient have medical equipment at home? none    Please check all that apply: Patient's condition impairs ambulation.      Diet Cardiac     Notify your health care provider if you experience any of the following:  redness, tenderness, or signs of infection (pain, swelling, redness, odor or green/yellow discharge around incision site)     Notify your health care provider if you experience any of the following:  temperature >100.4     Activity as tolerated       Significant Diagnostic Studies: Labs:   BMP:   Recent Labs   Lab 04/06/19  1544 04/07/19  0424   GLU 90 91    141   K 3.7 3.7    103   CO2 29 31*   BUN 16 14   CREATININE 0.9 0.9   CALCIUM 9.5 8.9   , CMP   Recent Labs   Lab 04/06/19  1544 04/07/19  0424    141   K 3.7 3.7    103   CO2 29 31*   GLU 90 91   BUN 16 14   CREATININE 0.9 0.9   CALCIUM 9.5 8.9   PROT 8.0  --    ALBUMIN 3.5  --    BILITOT 1.0  --    ALKPHOS 94  --    AST 15  --    ALT 11  --    ANIONGAP 9 7*   ESTGFRAFRICA >60 >60   EGFRNONAA >60 >60   , CBC   Recent Labs   Lab " 04/06/19  1544   WBC 10.31   HGB 11.5*   HCT 36.2*      , INR   Lab Results   Component Value Date    INR 1.1 04/06/2019    INR 1.1 11/09/2016    INR 1.1 03/01/2016   , Lipid Panel   Lab Results   Component Value Date    CHOL 205 (H) 04/06/2019    HDL 57 04/06/2019    LDLCALC 127.6 04/06/2019    TRIG 102 04/06/2019    CHOLHDL 27.8 04/06/2019   , Troponin No results for input(s): TROPONINI in the last 168 hours. and A1C:   Recent Labs   Lab 04/06/19  1544   HGBA1C 5.1       Pending Diagnostic Studies:     None         Medications:  Reconciled Home Medications:      Medication List      START taking these medications    amLODIPine 5 MG tablet  Commonly known as:  NORVASC  Take 1 tablet (5 mg total) by mouth once daily.     aspirin 81 MG EC tablet  Commonly known as:  ECOTRIN  Take 1 tablet (81 mg total) by mouth once daily.     atorvastatin 20 MG tablet  Commonly known as:  LIPITOR  Take 1 tablet (20 mg total) by mouth once daily.     hydroCHLOROthiazide 25 MG tablet  Commonly known as:  HYDRODIURIL  Take 1 tablet (25 mg total) by mouth once daily.        CONTINUE taking these medications    albuterol 0.63 mg/3 mL Nebu  Commonly known as:  ACCUNEB  Take 0.63 mg by nebulization every 6 (six) hours as needed.     BREO ELLIPTA INHL  Inhale into the lungs.     cyclobenzaprine 7.5 MG Tab  Commonly known as:  FEXMID  Take 10 mg by mouth 3 (three) times daily as needed.     ferrous gluconate 324 MG tablet  Commonly known as:  FERGON  Take 324 mg by mouth daily with breakfast.     fluticasone-salmeterol 250-50 mcg/dose 250-50 mcg/dose diskus inhaler  Commonly known as:  ADVAIR  Inhale 1 puff into the lungs 2 (two) times daily.        STOP taking these medications    lisinopril-hydrochlorothiazide 20-25 mg Tab  Commonly known as:  PRINZIDE,ZESTORETIC            Indwelling Lines/Drains at time of discharge:   Lines/Drains/Airways          None          Time spent on the discharge of patient: 30 minutes  Patient was seen  and examined on the date of discharge and determined to be suitable for discharge.         Sabrina Najera NP  Department of Hospital Medicine  [facility]

## 2019-04-08 NOTE — PT/OT/SLP DISCHARGE
Occupational Therapy Discharge Summary    Santa Plascencia  MRN: [id number]   Principal Problem: Right sided numbness      Patient Discharged from acute Occupational Therapy on 4/7/19.  Please refer to prior OT note dated 4/7/19 for functional status.    Assessment:      Patient was discharged unexpectedly.  Information required to complete an accurate discharge summary is unknown.  Refer to therapy initial evaluation and last progress note for initial and most recent functional status and goal achievement.  Recommendations made may be found in medical record.    Objective:     GOALS:   Multidisciplinary Problems     Occupational Therapy Goals     Not on file          Multidisciplinary Problems (Resolved)        Problem: Occupational Therapy Goal    Goal Priority Disciplines Outcome Interventions   Occupational Therapy Goal   (Resolved)     OT, PT/OT Outcome(s) achieved    Description:  Goals to be met by: 04/21/19     Patient will increase functional independence with ADLs by performing:    LE Dressing with Modified [place].  Grooming while standing at sink with Modified [place].  Toileting from toilet with Modified [place] for hygiene and clothing management.   Supine to sit with Modified [place].  Step transfer with Modified [place]  Toilet transfer to toilet with Modified [place].                      Reasons for Discontinuation of Therapy Services  Transfer to alternate level of care.      Plan:     Patient Discharged to: Home with Home Health Service    Nichol Shin OT  4/8/2019

## 2019-04-09 ENCOUNTER — TELEPHONE (OUTPATIENT)
Dept: VASCULAR SURGERY | Facility: CLINIC | Age: 64
End: 2019-04-09

## 2019-04-09 NOTE — TELEPHONE ENCOUNTER
----- Message from Tracy Tompkins sent at 4/9/2019  8:37 AM CDT -----  Contact: Pt's  Mr Plascencia [phone]  Pt's  is requesting a call back from the nurse to schedule as a new patient for a hospital follow up for the following:    Right sided and facial numbness, Concern for Acute Stroke    Mr Plascencia may be reached at [phone].    Thank you.        0912 Call to number listed. No answer did leave voice mail that appointment was made and to call the office with any questions. Left callback number.

## 2019-04-22 ENCOUNTER — OFFICE VISIT (OUTPATIENT)
Dept: VASCULAR SURGERY | Facility: CLINIC | Age: 64
End: 2019-04-22
Payer: MEDICAID

## 2019-04-22 VITALS — HEIGHT: 66 IN | HEART RATE: 67 BPM | WEIGHT: 293 LBS | BODY MASS INDEX: 47.09 KG/M2

## 2019-04-22 DIAGNOSIS — I65.29 SYMPTOMATIC CAROTID ARTERY STENOSIS WITHOUT INFARCTION, UNSPECIFIED LATERALITY: Primary | ICD-10-CM

## 2019-04-22 PROCEDURE — 99213 OFFICE O/P EST LOW 20 MIN: CPT | Mod: PBBFAC | Performed by: SURGERY

## 2019-04-22 PROCEDURE — 99204 PR OFFICE/OUTPT VISIT, NEW, LEVL IV, 45-59 MIN: ICD-10-PCS | Mod: S$PBB,,, | Performed by: SURGERY

## 2019-04-22 PROCEDURE — 99999 PR PBB SHADOW E&M-EST. PATIENT-LVL III: ICD-10-PCS | Mod: PBBFAC,,, | Performed by: SURGERY

## 2019-04-22 PROCEDURE — 99204 OFFICE O/P NEW MOD 45 MIN: CPT | Mod: S$PBB,,, | Performed by: SURGERY

## 2019-04-22 PROCEDURE — 99999 PR PBB SHADOW E&M-EST. PATIENT-LVL III: CPT | Mod: PBBFAC,,, | Performed by: SURGERY

## 2019-04-22 NOTE — LETTER
April 22, 2019      Lenny Brian MD  [address] [place] [address]           [facility]  [address] [facility] [address]  [facility] [address]  Phone: [phone]  Fax: [phone]          Patient: Santa Plascencia   MR Number: [id number]   YOB: 1955   Date of Visit: 4/22/2019       Dear Dr. Lenny Brian:    Thank you for referring Santa Plascencia to me for evaluation. Attached you will find relevant portions of my assessment and plan of care.    If you have questions, please do not hesitate to call me. I look forward to following Santa Plascencia along with you.    Sincerely,    Luis Miguel Lugo MD    Enclosure  CC:  No Recipients    If you would like to receive this communication electronically, please contact [email or web address] or [phone] to request more information on [employer] access.    For providers and/or their staff who would like to refer a patient to [facility], please contact us through our one-stop-shop provider referral line, [facility], at [phone].    If you feel you have received this communication in error or would no longer like to receive these types of communications, please e-mail [email or web address]

## 2019-04-22 NOTE — PROGRESS NOTES
Luis Miguel Lugo MD VI                       [facility]                         2019    HPI:  Santa Plascencia is a 64 y.o. female with   Patient Active Problem List   Diagnosis    Altered mental status    Right sided and facial numbness, Concern for Acute Stroke     Weakness of right upper extremity    Angioedema    HTN (hypertension)    Hyperlipidemia    Class 3 severe obesity due to excess calories in adult    being managed by PCP and specialists who is here today for evaluation of TIA.  Patient states location was L hemispheric 19 occurring 19.  Associated signs and symptoms were weakness and face/lip edema  Symptoms began 19.  Alleviating factors include stopping ACE-I.  Worsening factors include none.  Denies recurrent neuro changes.  Referred here after being seen by PCP after d/c.  On ASA, statin and BP control.  States RUE weakness resolved within 24 hrs.  No vision changes.    no MI  yes Stroke  Tobacco use: no    Past Medical History:   Diagnosis Date    Asthma     Bronchitis     Hypertension      Past Surgical History:   Procedure Laterality Date    ANKLE SURGERY       SECTION      CHOLECYSTECTOMY       Family History   Problem Relation Age of Onset    Hypertension Mother     Stroke Mother      Social History     Socioeconomic History    Marital status:      Spouse name: Not on file    Number of children: Not on file    Years of education: Not on file    Highest education level: Not on file   Occupational History    Not on file   Social Needs    Financial resource strain: Not on file    Food insecurity:     Worry: Not on file     Inability: Not on file    Transportation needs:     Medical: Not on file     Non-medical: Not on file   Tobacco Use    Smoking status: Never Smoker    Smokeless tobacco: Never Used   Substance and Sexual Activity    Alcohol use: No    Drug use: No    Sexual activity: Not on file   Lifestyle     Physical activity:     Days per week: Not on file     Minutes per session: Not on file    Stress: Not on file   Relationships    Social connections:     Talks on phone: Not on file     Gets together: Not on file     Attends Sikh service: Not on file     Active member of club or organization: Not on file     Attends meetings of clubs or organizations: Not on file     Relationship status: Not on file   Other Topics Concern    Not on file   Social History Narrative    Not on file       Current Outpatient Medications:     albuterol (ACCUNEB) 0.63 mg/3 mL Nebu, Take 0.63 mg by nebulization every 6 (six) hours as needed., Disp: , Rfl:     aspirin (ECOTRIN) 81 MG EC tablet, Take 1 tablet (81 mg total) by mouth once daily., Disp: , Rfl: 0    cyclobenzaprine (FEXMID) 7.5 MG Tab, Take 10 mg by mouth 3 (three) times daily as needed., Disp: , Rfl:     ferrous gluconate (FERGON) 324 MG tablet, Take 324 mg by mouth daily with breakfast., Disp: , Rfl:     fluticasone-salmeterol 250-50 mcg/dose (ADVAIR) 250-50 mcg/dose diskus inhaler, Inhale 1 puff into the lungs 2 (two) times daily., Disp: , Rfl:     FLUTICASONE/VILANTEROL (BREO ELLIPTA INHL), Inhale into the lungs., Disp: , Rfl:     hydroCHLOROthiazide (HYDRODIURIL) 25 MG tablet, Take 1 tablet (25 mg total) by mouth once daily., Disp: 30 tablet, Rfl: 11    amLODIPine (NORVASC) 5 MG tablet, Take 1 tablet (5 mg total) by mouth once daily., Disp: 30 tablet, Rfl: 11    atorvastatin (LIPITOR) 20 MG tablet, Take 1 tablet (20 mg total) by mouth once daily., Disp: 90 tablet, Rfl: 3    REVIEW OF SYSTEMS:  General: No fevers or chills; ENT: No sore throat; Allergy and Immunology: no persistent infections; Hematological and Lymphatic: No history of bleeding or easy bruising; Endocrine: negative; Respiratory: no cough, shortness of breath, or wheezing; Cardiovascular: no chest pain or dyspnea on exertion; Gastrointestinal: no abdominal pain/back, change in bowel habits, or  bloody stools; Genito-Urinary: no dysuria, trouble voiding, or hematuria; Musculoskeletal: negative; Neurological: no TIA or stroke symptoms; Psychiatric: no nervousness, anxiety or depression.    PHYSICAL EXAM:   Right Arm BP - Sittin/72 (19 1004)  Left Arm BP - Sittin/74 (19 1004)  Pulse: 67         General appearance:  Alert, well-appearing, and in no distress.  Oriented to person, place, and time                    Neurological: Normal speech, no focal findings noted; CN II - XII grossly intact. All extremities with sensation to light touch.  Tongue midline.  EOMI.           Musculoskeletal: Digits/nail without cyanosis/clubbing.  Strength 5/5 BLE.                    Neck: Supple, no significant adenopathy, L carotid bruit can be auscultated                  Chest:  Clear to auscultation, no wheezes, rales or rhonchi, symmetric air entry. No use of accessory muscles               Cardiac: Normal rate and regular rhythm, S1 and S2 normal            Abdomen: Soft, nontender, nondistended, no masses or organomegaly, no hernia     No rebound tenderness noted; bowel sounds normal     No groin adenopathy      Extremities:   2+ radial pulse bilaterally     No ext edema    Skin: No tissue loss    LAB RESULTS:  No results found for: CBC  Lab Results   Component Value Date    LABPROT 11.0 2019    INR 1.1 2019     Lab Results   Component Value Date     2019    K 3.7 2019     2019    CO2 31 (H) 2019    GLU 91 2019    BUN 14 2019    CREATININE 0.9 2019    CALCIUM 8.9 2019    ANIONGAP 7 (L) 2019    EGFRNONAA >60 2019     Lab Results   Component Value Date    WBC 10.31 2019    RBC 3.77 (L) 2019    HGB 11.5 (L) 2019    HCT 36.2 (L) 2019    MCV 96 2019    MCH 30.5 2019    MCHC 31.8 (L) 2019    RDW 13.1 2019     2019    MPV 11.0 2019    GRAN 7.8 (H)  04/06/2019    GRAN 75.4 (H) 04/06/2019    LYMPH 1.9 04/06/2019    LYMPH 18.6 04/06/2019    MONO 0.6 04/06/2019    MONO 5.5 04/06/2019    EOS 0.0 04/06/2019    BASO 0.02 04/06/2019    EOSINOPHIL 0.3 04/06/2019    BASOPHIL 0.2 04/06/2019    DIFFMETHOD Automated 04/06/2019     .  Lab Results   Component Value Date    HGBA1C 5.1 04/06/2019       IMAGING:  All pertinent imaging has been reviewed and interpreted independently.    Carotid US 4/6/19: R 0-39% ICA stenosis, L ICA 60-79% stenosis    IMP/PLAN:  64 y.o. female with   Patient Active Problem List   Diagnosis    Altered mental status    Right sided and facial numbness, Concern for Acute Stroke     Weakness of right upper extremity    Angioedema    HTN (hypertension)    Hyperlipidemia    Class 3 severe obesity due to excess calories in adult    being managed by PCP and specialists who is here today for evaluation of symptomatic L ICA stenosis.    -Rec L CEA;risks and benefits of surgical mgmt vs medical therapy d/w pt and  - pt states she would like to consider her treatment options and contact our office tomorrow; I explained the importance to performing the procedure as soon as possible.  -Rec increase in statin dose to 40 mg  -Cont ASA, BP control  -Will obtain CTA to fully evaluate distal ICA plaque  -Will contact pt tomorrow to discuss her decision    I spent 20 minutes evaluating this patient and greater than 50% of the time was spent counseling, coordinator care and discussing the plan of care.  All questions were answered and patient stated understanding with agreement with the above treatment plan.    Luis Miguel Lugo MD [facility]  Vascular and Endovascular Surgery

## 2019-04-22 NOTE — PATIENT INSTRUCTIONS
Carotid Artery Problems: Surgery for TIAs    You have narrowing of a blood vessel or carotid artery in your neck. It is caused by a substance called plaque that has built up in your artery. This can cause a transient ischemic attack or TIA. During a TIA, narrowing in the artery blocks blood to your brain for a short time. Fortunately, the damage to the brain is not permanent and symptoms resolve within 24 hours. However, a TIA is a warning that a stroke may happen in the near future. You and your doctor will discuss the best course of treatment for you.  Your treatment plan  The need for surgery depends on your symptoms. It also depends on how narrow your carotid artery is. If you have mild narrowing but have TIAs, you may need surgery. If you have severe narrowing and no TIAs, you may need surgery. This is because your risk of stroke may be high.  If surgery is needed, you will have a carotid endarterectomy. The surgery removes plaque from inside of your carotid artery. The inside of your carotid artery is made wider and smoother. This reduces the chance of having a stroke.  Date Last Reviewed: 6/13/2015  © 1048-5937 [employer]. [address] [place] [address]. All rights reserved. This information is not intended as a substitute for professional medical care. Always follow your healthcare professional's instructions.        Preparing for Carotid Endarterectomy  Carotid endarterectomy removes plaque that has built up in your carotid artery. This helps reduce your risk for stroke. You will be told how to prepare for your procedure. Be sure to follow all of the instructions you are given.    A week before  Before the procedure, be sure to:  · Tell your doctor about any allergies you have.  · Tell your doctor about all medications you take. This includes over-the-counter medications, herbs, and supplements. Be sure to mention if you are taking blood thinners.  · Make medication changes as  directed by your doctor. You may need to stop taking certain medications you normally take. Or you may be told to start taking certain medications before surgery.  The day before  Before you have your procedure, be sure to:  · Arrange for a ride home when your hospital recovery is finished. An adult family member or friend should drive you home.  · Dont eat or drink after midnight, the night before the procedure. Ask your doctor whether you should continue to take any medications during this period.  · Follow any other instructions you are given.  On the day of the procedure  When you arrive at the hospital, youll change into a hospital gown. Hospital staff will prepare you for the procedure. An IV line will be started to provide you with fluids and medications. You will then be taken to the room where the procedure is performed.  Risks and possible complications  The risks of this procedure include:  · TIA or stroke  · Bleeding at the incision site  · Headache  · Bleeding into the brain  · Seizure  · Heart attack  · Nerve injury leading to temporary or permanent hoarseness, numbness, or swallowing problems  · Death   Date Last Reviewed: 6/13/2015 © 2000-2017 [employer]. [address] [place] [address]. All rights reserved. This information is not intended as a substitute for professional medical care. Always follow your healthcare professional's instructions.        Having Carotid Endarterectomy     A skin incision is made over the carotid artery.     Endarterectomy is the removal of plaque from the carotid artery through an incision in the neck. This surgery has a low risk of stroke or complication (1% to 3%). It typically involves a quick recovery with little pain. You may be asleep under general anesthesia during surgery, or awake with local anesthesia to control pain. This will be discussed with you before surgery.  How the endarterectomy is performed  1. Make the skin incision. The  surgeon makes an incision in the skin over the carotid artery. The image above shows a common incision site and length.  2. Open the artery. The surgeon places clamps on the artery above and below the blockage. This temporarily stops blood flow. The brain receives blood from the carotid artery on the other side of your neck. The surgeon then makes an incision in the artery itself.  3. Place shunt. A shunt may be used to preserve blood flow to the brain during the procedure. After the shunt is in place, the clamps are removed from the internal carotid artery. In some cases a shunt is not needed because the brain is receiving enough blood through other arteries (from the carotid artery on the other side of your neck).   4. Remove plaque. The surgeon loosens plaque from the artery wall. The plaque is then removed, often in a single piece. The surgeon inspects the artery to confirm that all of the plaque has been removed.  5.      A shunt helps keep blood flowing during the procedure.     Close the incision. The surgeon closes the incision using either sutures or a patch. The clamps are then removed. Next, the skin incision is sutured closed. A tube or drain may be put in place to keep fluids from collecting around the area.  The surgery usually takes around 2 hours, but may be longer depending on the anesthetic and your situation.  Date Last Reviewed: 6/8/2015  © 4430-0958 [employer], [employer]. [address] [place] [address]. All rights reserved. This information is not intended as a substitute for professional medical care. Always follow your healthcare professional's instructions.        What is a TIA?  A TIA (Transient Ischemic Attack) is an early warning that a stroke (also called a brain attack) is coming. A TIA is a temporary stroke. It causes no lasting damage. But the effects of a stroke, if it happens, can be very serious and lasting. If you think you are having symptoms of a TIA or  stroke--even if they dont last--get medical help right away.     If you have any symptoms of a TIA or stroke, call 911 and your doctor as soon as possible.     Symptoms of TIA and stroke  Symptoms may come on suddenly and last for a few seconds or a few hours. You may have symptoms only once. Or they may come and go for days. If you notice any of the following symptoms, dont wait. Call 911 or emergency services right away.  · Weakness, numbness, tingling, or loss of feeling in your face, arm, or leg.  · Trouble seeing in one or both eyes; double vision.  · Slurred speech, trouble talking, or problems understanding others when they speak  · Sudden, severe headache  · Dizziness or a feeling of spinning  · Loss of balance or falling  · Blackouts  Date Last Reviewed: 6/8/2015  © 4536-2265 [employer]. [address] [place] [address], [place] [address]. All rights reserved. This information is not intended as a substitute for professional medical care. Always follow your healthcare professional's instructions.

## 2019-04-26 ENCOUNTER — HOSPITAL ENCOUNTER (OUTPATIENT)
Dept: RADIOLOGY | Facility: HOSPITAL | Age: 64
Discharge: HOME | End: 2019-04-26
Attending: SURGERY
Payer: MEDICAID

## 2019-04-26 DIAGNOSIS — I65.29 SYMPTOMATIC CAROTID ARTERY STENOSIS WITHOUT INFARCTION, UNSPECIFIED LATERALITY: ICD-10-CM

## 2019-04-26 PROCEDURE — 70498 CTA NECK: ICD-10-PCS | Mod: 26,,, | Performed by: RADIOLOGY

## 2019-04-26 PROCEDURE — 25500020 PHARM REV CODE 255: Performed by: SURGERY

## 2019-04-26 PROCEDURE — 70498 CT ANGIOGRAPHY NECK: CPT | Mod: 26,,, | Performed by: RADIOLOGY

## 2019-04-26 PROCEDURE — 70498 CT ANGIOGRAPHY NECK: CPT | Mod: TC

## 2019-04-26 RX ADMIN — IOHEXOL 75 ML: 350 INJECTION, SOLUTION INTRAVENOUS at 10:04

## 2019-04-29 DIAGNOSIS — R94.39 MILD LUMINAL IRREGULARITY OF CAROTID ARTERY ON ULTRASOUND: Primary | ICD-10-CM

## 2019-04-30 ENCOUNTER — TELEPHONE (OUTPATIENT)
Dept: VASCULAR SURGERY | Facility: CLINIC | Age: 64
End: 2019-04-30

## 2019-05-06 ENCOUNTER — OFFICE VISIT (OUTPATIENT)
Dept: VASCULAR SURGERY | Facility: CLINIC | Age: 64
End: 2019-05-06
Payer: MEDICAID

## 2019-05-06 ENCOUNTER — HOSPITAL ENCOUNTER (OUTPATIENT)
Dept: RADIOLOGY | Facility: HOSPITAL | Age: 64
Discharge: HOME | End: 2019-05-06
Attending: SURGERY
Payer: MEDICAID

## 2019-05-06 VITALS — HEART RATE: 74 BPM | HEIGHT: 66 IN | WEIGHT: 293 LBS | BODY MASS INDEX: 47.09 KG/M2

## 2019-05-06 DIAGNOSIS — I87.2 VENOUS INSUFFICIENCY: Primary | ICD-10-CM

## 2019-05-06 DIAGNOSIS — I77.3: ICD-10-CM

## 2019-05-06 DIAGNOSIS — R94.39 MILD LUMINAL IRREGULARITY OF CAROTID ARTERY ON ULTRASOUND: ICD-10-CM

## 2019-05-06 PROCEDURE — 93975 US DOPPLER RENAL ARTERY AND VEIN (XPD): ICD-10-PCS | Mod: 26,,, | Performed by: RADIOLOGY

## 2019-05-06 PROCEDURE — 99999 PR PBB SHADOW E&M-EST. PATIENT-LVL III: ICD-10-PCS | Mod: PBBFAC,,, | Performed by: SURGERY

## 2019-05-06 PROCEDURE — 93975 VASCULAR STUDY: CPT | Mod: TC

## 2019-05-06 PROCEDURE — 99213 OFFICE O/P EST LOW 20 MIN: CPT | Mod: PBBFAC,25 | Performed by: SURGERY

## 2019-05-06 PROCEDURE — 99214 PR OFFICE/OUTPT VISIT, EST, LEVL IV, 30-39 MIN: ICD-10-PCS | Mod: S$PBB,,, | Performed by: SURGERY

## 2019-05-06 PROCEDURE — 99999 PR PBB SHADOW E&M-EST. PATIENT-LVL III: CPT | Mod: PBBFAC,,, | Performed by: SURGERY

## 2019-05-06 PROCEDURE — 99214 OFFICE O/P EST MOD 30 MIN: CPT | Mod: S$PBB,,, | Performed by: SURGERY

## 2019-05-06 PROCEDURE — 93975 VASCULAR STUDY: CPT | Mod: 26,,, | Performed by: RADIOLOGY

## 2019-05-06 NOTE — PROGRESS NOTES
Luis Miguel Lugo MD VI                       [facility]                         2019    HPI:  Santa Plascencia is a 64 y.o. female with   Patient Active Problem List   Diagnosis    Altered mental status    Right sided and facial numbness, Concern for Acute Stroke     Weakness of right upper extremity    Angioedema    HTN (hypertension)    Hyperlipidemia    Class 3 severe obesity due to excess calories in adult    being managed by PCP and specialists who is here today for evaluation of TIA.  Patient states location was L hemispheric 19 occurring 19.  Associated signs and symptoms were weakness and face/lip edema  Symptoms began 19.  Alleviating factors include stopping ACE-I.  Worsening factors include none.  Denies recurrent neuro changes.  Referred here after being seen by PCP after d/c.  On ASA, statin and BP control.  States RUE weakness resolved within 24 hrs.  No vision changes.    no MI  yes Stroke  Tobacco use: no    Interval history: No new issues, no new neuro events.  States BP elevated.  C/o BLE edema.    Past Medical History:   Diagnosis Date    Asthma     Bronchitis     Hypertension      Past Surgical History:   Procedure Laterality Date    ANKLE SURGERY       SECTION      CHOLECYSTECTOMY       Family History   Problem Relation Age of Onset    Hypertension Mother     Stroke Mother      Social History     Socioeconomic History    Marital status:      Spouse name: Not on file    Number of children: Not on file    Years of education: Not on file    Highest education level: Not on file   Occupational History    Not on file   Social Needs    Financial resource strain: Not on file    Food insecurity:     Worry: Not on file     Inability: Not on file    Transportation needs:     Medical: Not on file     Non-medical: Not on file   Tobacco Use    Smoking status: Never Smoker    Smokeless tobacco: Never Used   Substance and Sexual  Activity    Alcohol use: No    Drug use: No    Sexual activity: Not on file   Lifestyle    Physical activity:     Days per week: Not on file     Minutes per session: Not on file    Stress: Not on file   Relationships    Social connections:     Talks on phone: Not on file     Gets together: Not on file     Attends Christian service: Not on file     Active member of club or organization: Not on file     Attends meetings of clubs or organizations: Not on file     Relationship status: Not on file   Other Topics Concern    Not on file   Social History Narrative    Not on file       Current Outpatient Medications:     albuterol (ACCUNEB) 0.63 mg/3 mL Nebu, Take 0.63 mg by nebulization every 6 (six) hours as needed., Disp: , Rfl:     amLODIPine (NORVASC) 5 MG tablet, Take 1 tablet (5 mg total) by mouth once daily., Disp: 30 tablet, Rfl: 11    aspirin (ECOTRIN) 81 MG EC tablet, Take 1 tablet (81 mg total) by mouth once daily., Disp: , Rfl: 0    atorvastatin (LIPITOR) 20 MG tablet, Take 1 tablet (20 mg total) by mouth once daily., Disp: 90 tablet, Rfl: 3    cyclobenzaprine (FEXMID) 7.5 MG Tab, Take 10 mg by mouth 3 (three) times daily as needed., Disp: , Rfl:     ferrous gluconate (FERGON) 324 MG tablet, Take 324 mg by mouth daily with breakfast., Disp: , Rfl:     fluticasone-salmeterol 250-50 mcg/dose (ADVAIR) 250-50 mcg/dose diskus inhaler, Inhale 1 puff into the lungs 2 (two) times daily., Disp: , Rfl:     FLUTICASONE/VILANTEROL (BREO ELLIPTA INHL), Inhale into the lungs., Disp: , Rfl:     hydroCHLOROthiazide (HYDRODIURIL) 25 MG tablet, Take 1 tablet (25 mg total) by mouth once daily., Disp: 30 tablet, Rfl: 11    REVIEW OF SYSTEMS:  General: No fevers or chills; ENT: No sore throat; Allergy and Immunology: no persistent infections; Hematological and Lymphatic: No history of bleeding or easy bruising; Endocrine: negative; Respiratory: no cough, shortness of breath, or wheezing; Cardiovascular: no chest  pain or dyspnea on exertion; Gastrointestinal: no abdominal pain/back, change in bowel habits, or bloody stools; Genito-Urinary: no dysuria, trouble voiding, or hematuria; Musculoskeletal: negative; Neurological: no TIA or stroke symptoms; Psychiatric: no nervousness, anxiety or depression.    PHYSICAL EXAM:   Right Arm BP - Sittin/84 (19 0949)  Left Arm BP - Sittin/76 (19 0949)  Pulse: 74         General appearance:  Alert, well-appearing, and in no distress.  Oriented to person, place, and time                    Neurological: Normal speech, no focal findings noted; CN II - XII grossly intact. All extremities with sensation to light touch.  Tongue midline.  EOMI.           Musculoskeletal: Digits/nail without cyanosis/clubbing.  Strength 5/5 BLE.                    Neck: Supple, no significant adenopathy, L carotid bruit can be auscultated                  Chest:  Clear to auscultation, no wheezes, rales or rhonchi, symmetric air entry. No use of accessory muscles               Cardiac: Normal rate and regular rhythm, S1 and S2 normal            Abdomen: Soft, nontender, nondistended, no masses or organomegaly, no hernia     No rebound tenderness noted; bowel sounds normal     No groin adenopathy      Extremities:   2+ radial pulse bilaterally     2+ BLE edema    Skin: No tissue loss    LAB RESULTS:  No results found for: CBC  Lab Results   Component Value Date    LABPROT 11.0 2019    INR 1.1 2019     Lab Results   Component Value Date     2019    K 3.7 2019     2019    CO2 31 (H) 2019    GLU 91 2019    BUN 14 2019    CREATININE 0.9 2019    CALCIUM 8.9 2019    ANIONGAP 7 (L) 2019    EGFRNONAA >60 2019     Lab Results   Component Value Date    WBC 10.31 2019    RBC 3.77 (L) 2019    HGB 11.5 (L) 2019    HCT 36.2 (L) 2019    MCV 96 2019    MCH 30.5 2019    MCHC 31.8 (L)  04/06/2019    RDW 13.1 04/06/2019     04/06/2019    MPV 11.0 04/06/2019    GRAN 7.8 (H) 04/06/2019    GRAN 75.4 (H) 04/06/2019    LYMPH 1.9 04/06/2019    LYMPH 18.6 04/06/2019    MONO 0.6 04/06/2019    MONO 5.5 04/06/2019    EOS 0.0 04/06/2019    BASO 0.02 04/06/2019    EOSINOPHIL 0.3 04/06/2019    BASOPHIL 0.2 04/06/2019    DIFFMETHOD Automated 04/06/2019     .  Lab Results   Component Value Date    HGBA1C 5.1 04/06/2019       IMAGING:  All pertinent imaging has been reviewed and interpreted independently.    Carotid US 4/6/19: R 0-39% ICA stenosis, L ICA 60-79% stenosis    CTA reviewed 4/26/19: No significant carotid stenosis.    Renal artery US 5/6/19: No evidence of arterial abnormality or stenosis.    IMP/PLAN:  64 y.o. female with   Patient Active Problem List   Diagnosis    Altered mental status    Right sided and facial numbness, Concern for Acute Stroke     Weakness of right upper extremity    Angioedema    HTN (hypertension)    Hyperlipidemia    Class 3 severe obesity due to excess calories in adult    being managed by PCP and specialists who is here today for evaluation of symptomatic L ICA stenosis.    -Rec medical mgmt with ASA, statin and BP control  -Will need optimization of BP control by Dr. Wilcox   -Rec increase in statin dose to 40 mg  -Neurology evaluation due to concern for TIA without significant carotid stenosis   -BLE edema - recommend compression with Rx stockings, elevation, dietary changes associated with water and sodium intake discussed at length with patient  -RTC 6 mo for further evaluation with venous reflux US    I spent 20 minutes evaluating this patient and greater than 50% of the time was spent counseling, coordinator care and discussing the plan of care.  All questions were answered and patient stated understanding with agreement with the above treatment plan.    Luis Miguel Lugo MD [facility]  Vascular and Endovascular Surgery

## 2019-05-06 NOTE — PATIENT INSTRUCTIONS
What is a TIA?  A TIA (Transient Ischemic Attack) is an early warning that a stroke (also called a brain attack) is coming. A TIA is a temporary stroke. It causes no lasting damage. But the effects of a stroke, if it happens, can be very serious and lasting. If you think you are having symptoms of a TIA or stroke--even if they dont last--get medical help right away.     If you have any symptoms of a TIA or stroke, call 911 and your doctor as soon as possible.     Symptoms of TIA and stroke  Symptoms may come on suddenly and last for a few seconds or a few hours. You may have symptoms only once. Or they may come and go for days. If you notice any of the following symptoms, dont wait. Call 911 or emergency services right away.  · Weakness, numbness, tingling, or loss of feeling in your face, arm, or leg.  · Trouble seeing in one or both eyes; double vision.  · Slurred speech, trouble talking, or problems understanding others when they speak  · Sudden, severe headache  · Dizziness or a feeling of spinning  · Loss of balance or falling  · Blackouts  Date Last Reviewed: 6/8/2015  © 0173-9051 [employer] [address] [place] [address]. All rights reserved. This information is not intended as a substitute for professional medical care. Always follow your healthcare professional's instructions.        Transient Ischemic Attack (TIA)     Your symptoms were caused by a TIA, or mini-stroke. Even though your symptoms have gone away, this condition is as serious as a full stroke. It means you are more likely to have a full stroke. About 1 in 3 people who have a TIA go on to have a full stroke. And 4% to 10% of those people will have the stroke within 2 days.  A TIA is caused when something decreases or blocks blood flow to a part of your brain. A TIA often happens when a blood clot travels to a blood vessel in the brain. The clot reduces or blocks blood flow. This causes the symptoms you had. After  a short while, the clot dissolves. Blood flows again, and the symptoms go away. People with hardening of the arteries (atherosclerosis) are at higher risk for a TIA. So are people who have an irregular heartbeat called atrial fibrillation.  A TIA causes symptoms similar to a stroke, but they last less than 24 hours. A full stroke causes symptoms that last more than 24 hours and may be permanent. But even if your symptoms only lasted a short time, the TIA may have damaged your brain tissue. Once you have had a TIA, you are at risk of having a full stroke. You will need tests to look at the blood flow to your brain. The tests can also rule out other causes of your symptoms. The tests may include an ultrasound of the arteries in your neck and an evaluation of your heart. They may also include a CT scan of your brain, an MRI scan of your brain, or both. If your healthcare provider finds problems, he or she will recommend treatment with medicines, procedures, or both.  Your provider may prescribe medicines to reduce your chance of having another TIA and stroke. These may include medicines that prevent blood clots, such as antiplatelet medicines and blood thinners (anticoagulants). Your doctor may recommend other treatment. This may include a procedure to open up a blocked artery in your neck.  Home care  The following guidelines will help you take care of yourself at home:  · Take any medicines your doctor has prescribed as directed. These may include antiplatelet medicines or medicines for other conditions, such as high blood pressure or high cholesterol.  · A TIA is a serious event that puts you at risk of having a full stroke. Because of this, it is important to take steps to help prevent a stroke from happening. Your doctor will look at all of your risk factors when deciding on what other treatment you may need.  Ways to reduce your risk for stroke  High blood pressure, diabetes, high cholesterol, heavy drinking, and  smoking are risk factors for stroke and heart disease. You can control these by taking medicines and making diet and lifestyle changes. One way to help prevent a stroke is to take aspirin or a similar medicine every day. But don't take daily aspirin unless your healthcare provider tells you to.  Your provider will work with you to make lifestyle changes to help prevent a stroke.  Diet  Your healthcare provider will give you information about changes you may need to make to your diet. You may need to see a registered dietitian for help with diet changes. Changes may include:  · Eating less fat and cholesterol  · Eating less salt (sodium). This is especially important if you have high blood pressure.  · Eating more fresh fruits and vegetables  · Eating lean proteins, such as fish, poultry, beans, and peas  · Eating less red meat and processed meats  · Using low-fat dairy products  · Using vegetable and nut oils in limited amounts  · Limiting how many sweets and processed foods such as chips, cookies, and baked goods you eat  · Limiting how much alcohol you drink  Physical activity  Your healthcare provider may recommend that you get more exercise if you have not been as active as possible. He or she may suggest that you get 40 minutes of moderate to vigorous physical activity each day. You should do this at least 3 to 4 days a week. A few examples of moderate to vigorous exercise are:  · Walking at a brisk pace, about 3 to 4 miles per hour  · Jogging or running  · Swimming or water aerobics  · Hiking  · Dancing  · Martial arts  · Tennis  · Riding a bike  Other ways to reduce your risk  · Weight management. If you are overweight or obese, your healthcare provider will work with you to lose weight and lower your body mass index (BMI) to a normal or near-normal level. Making diet changes and increasing physical activity can help.  · Smoking. If you smoke, break the habit. Enroll in a stop smoking program to improve your  chances of success.  · Stress. Learn how to manage your stress. This will help you deal with stress at home and at work.  Follow-up care  Call your doctor for an appointment in the next few days for another evaluation, or as advised. This is to make a plan for preventing another TIA or stroke. You may need to see a neurologist to follow up on your TIA. A neurologist is a doctor who specializes in treating brain and nervous system problems. You may need other tests or procedures.  If you had an X-ray, CT scan, MRI scan, or ECG (electrocardiogram), a specialist will review it. Youll be told of any new findings that will affect your care.  Call 911  Call 911 if any of these occur:  · Any of your TIA symptoms return  · New problems with speech, vision, walking, or weakness or numbness of the face or on one side of the body  · Severe headache, fainting spell, dizziness, or seizure  Date Last Reviewed: 10/1/2016  © 3321-5604 "[employer]. [address] [place] [address]. All rights reserved. This information is not intended as a substitute for professional medical care. Always follow your healthcare professional's instructions.        Putting on Compression Stockings     Turn the stocking inside-out, then fit it over your toes and heel.          Roll the stocking up your leg.            Once stockings are on, make sure the top of the stocking is about two fingers width below the crease of the knee (or the groin if you wear thigh-high stockings).          Use equipment, such as a stocking zaire, or wear rubber gloves to make it easier to put on compression stockings.         Elastic compression stockings are prescribed to treat many vein problems. Wearing them may be the most important thing you do to manage your symptoms. The stockings fit tightly around your ankle, gradually reducing in pressure as they go up your legs. This helps keep blood flowing to your heart. As a result, swelling is  reduced. Your healthcare provider will prescribe stockings at a safe pressure for you. He or she will also tell you how often to wear and remove the stockings. Follow these instructions closely. Also, do not buy or wear compression stockings without first seeing your healthcare provider.  Tips for wear and care  To wear stockings safely and to get the most benefit:  · Wear the length prescribed by your healthcare provider.  · Pull them to the designated height and no farther. Dont let them bunch at the top. This can restrict blood flow and increase swelling.  · Wear the stockings for the amount of time your healthcare provider recommends. Replace them when they start to feel loose. This will likely be every 3 to 6 months.  · Remove them as your healthcare provider directs. When removed, wash your legs. Then check your legs and feet for sores. Call your healthcare provider if you find a sore. Dont put the stockings back on unless your healthcare provider directs.   · Wash the stockings as instructed. They may need to be hand-washed.  Date Last Reviewed: 5/1/2016 © 2000-2017 [employer]. [address] [place] [address]. All rights reserved. This information is not intended as a substitute for professional medical care. Always follow your healthcare professional's instructions.        Tips for Using Less Salt    Most people with heart problems need to eat less salt (sodium). Reducing the amount of salt you eat may help control your blood pressure. The higher your blood pressure, the greater your risk for heart disease, stroke, blindness, and kidney problems.  At the store  · Make low-salt choices by reading labels carefully. Look for the total amount of sodium per serving.  · Use more fresh food. Buy more fruits and vegetables. Select lean meats, fish, and poultry.  · Use fewer frozen, canned, and packaged foods which often contain a lot of sodium.  · Use plain frozen vegetables without  sauces or toppings. These products are often low- or no-sodium.  · Opt for reduced-sodium or no-salt-added versions of canned vegetables and soups.  In the kitchen  · Don't add salt to food when you're cooking. Season with flavorings such as onion, garlic, pepper, salt-free herbal blends, and lemon or lime juice.  · Use a cookbook containing low-salt recipes. It can give you ideas for tasty meals that are healthy for your heart.  · Sprinkle salt-free herbal blends on vegetables and meat.  · Drain and rinse canned foods, such as canned beans and vegetables, before cooking or eating.  Eating out  · Tell the  you're on a low-salt diet. Ask questions about the menu.  · Order fish, chicken, and meat broiled, baked, poached, or grilled without salt, butter, or breading.  · Use lemon, pepper, and salt-free herb mixes to add flavor.  · Choose plain steamed rice, boiled noodles, and baked or boiled potatoes. Top potatoes with chives and a little sour cream.     Beware! Salt goes by many other names. Limit foods with these words listed as ingredients: salt, sodium, soy sauce, baking soda, baking powder, MSG, monosodium, Na (the chemical symbol for sodium). Some antacids are also high in salt.   Date Last Reviewed: 6/19/2015  © 5951-2561 [employer]. [address] [place] [address], [place] [address]. All rights reserved. This information is not intended as a substitute for professional medical care. Always follow your healthcare professional's instructions.        Low-Salt Diet  This diet removes foods that are high in salt. It also limits the amount of salt you use when cooking. It is most often used for people with high blood pressure, edema (fluid retention), and kidney, liver, or heart disease.  Table salt contains the mineral sodium. Your body needs sodium to work normally. But too much sodium can make your health problems worse. Your healthcare provider is recommending a low-salt (also called low-sodium) diet  for you. Your total daily allowance of salt is 1,500 to 2,300 milligrams (mg). It is less than 1 teaspoon of table salt. This means you can have only about 500 to 700 mg of sodium at each meal. People with certain health problems should limit salt intake to the lower end of the recommended range.    When you cook, dont add much salt. If you can cook without using salt, even better. Dont add salt to your food at the table.  When shopping, read food labels. Salt is often called sodium on the label. Choose foods that are salt-free, low salt, or very low salt. Note that foods with reduced salt may not lower your salt intake enough.    Beans, potatoes, and pasta  Ok: Dry beans, split peas, lentils, potatoes, rice, macaroni, pasta, spaghetti without added salt  Avoid: Potato chips, tortilla chips, and similar products  Breads and cereals  Ok: Low-sodium breads, rolls, cereals, and cakes; low-salt crackers, matzo crackers  Avoid: Salted crackers, pretzels, popcorn, Sammarinese toast, pancakes, muffins  Dairy  Ok: Milk, chocolate milk, hot chocolate mix, low-salt cheeses, and yogurt  Avoid: Processed cheese and cheese spreads; Roquefort, Camembert, and cottage cheese; buttermilk, instant breakfast drink  Desserts  Ok: Ice cream, frozen yogurt, juice bars, gelatin, cookies and pies, sugar, honey, jelly, hard candy  Avoid: Most pies, cakes and cookies prepared or processed with salt; instant pudding  Drinks  Ok: Tea, coffee, fizzy (carbonated) drinks, juices  Avoid: Flavored coffees, electrolyte replacement drinks, sports drinks  Meats  Ok: All fresh meat, fish, poultry, low-salt tuna, eggs, egg substitute  Avoid: Smoked, pickled, brine-cured, or salted meats and fish. This includes snyder, chipped beef, corned beef, hot dogs, deli meats, ham, kosher meats, salt pork, sausage, canned tuna, salted codfish, smoked salmon, herring, sardines, or anchovies.  Seasonings and spices  Ok: Most seasonings are okay. Good substitutes for  salt include: fresh herb blends, hot sauce, lemon, garlic, coyle, vinegar, dry mustard, parsley, cilantro, horseradish, tomato paste, regular margarine, mayonnaise, unsalted butter, cream cheese, vegetable oil, cream, low-salt salad dressing and gravy.  Avoid: Regular ketchup, relishes, pickles, soy sauce, teriyaki sauce, Worcestershire sauce, BBQ sauce, tartar sauce, meat tenderizer, chili sauce, regular gravy, regular salad dressing, salted butter  Soups  Ok: Low-salt soups and broths made with allowed foods  Avoid: Bouillon cubes, soups with smoked or salted meats, regular soup and broth  Vegetables  Ok: Most vegetables are okay; also low-salt tomato and vegetable juices  Avoid: Sauerkraut and other brine-soaked vegetables; pickles and other pickled vegetables; tomato juice, olives  Date Last Reviewed: 8/1/2016 © 2000-2017 [employer]. [address] [place] [address]. All rights reserved. This information is not intended as a substitute for professional medical care. Always follow your healthcare professional's instructions.        Low-Salt Choices  Eating salt (sodium) can make your body retain too much water. Excess water makes your heart work harder. Canned, packaged, and frozen foods are easy to prepare, but they are often high in sodium. Here are some ideas for low-salt foods you can easily prepare yourself.    For breakfast  · Fruit or 100% fruit juice  · Whole-wheat bread or an English muffin. Compare sodium content on labels.  · Low-fat milk or yogurt  · Unsalted eggs  · Shredded wheat  · Corn tortillas  · Unsalted steamed rice  · Regular (not instant) hot cereal, made without salt  Stay away from:  · Sausage, snyder, and ham  · Flour tortillas  · Packaged muffins, pancakes, and biscuits  · Instant hot cereals  · Cottage cheese  For lunch and dinner  · Fresh fish, chicken, turkey, or meat--baked, broiled, or roasted without salt  · Dry beans, cooked without salt  · Tofu, stir-fried  without salt  · Unsalted fresh fruit and vegetables, or frozen or canned fruit and vegetables with no added salt  Stay away from:  · Lunch or deli meat that is cured or smoked  · Cheese  · Tomato juice and catsup  · Canned vegetables, soups, and fish not labeled as no-salt-added or reduced sodium  · Packaged gravies and sauces  · Olives, pickles, and relish  · Bottled salad dressings  For snacks and desserts  · Yogurt  · Unsalted, air popped popcorn  · Unsalted nuts or seeds  Stay away from:  · Pies and cakes  · Packaged dessert mixes  · Pizza  · Canned and packaged puddings  · Pretzels, chips, crackers, and nuts--unless the label says unsalted  Date Last Reviewed: 6/17/2015  © 5262-0767 [employer] [address] [place] [address], [place] [address]. All rights reserved. This information is not intended as a substitute for professional medical care. Always follow your healthcare professional's instructions.

## 2019-05-06 NOTE — LETTER
May 6, 2019        Joao Wilcox Jr., MD  [address] [facility]  [address]  [facility] [address]             [facility] Vascular Surgery  [address] [facility] [address]  [facility] [address]  Phone: [phone]  Fax: [phone]   Patient: Santa Plascencia   MR Number: [id number]   YOB: 1955   Date of Visit: 5/6/2019       Dear Dr. Wilcox:    Thank you for referring Santa Plascencia to me for evaluation. Below are the relevant portions of my assessment and plan of care.            If you have questions, please do not hesitate to call me. I look forward to following Santa along with you.    Sincerely,      Luis Miguel Lugo MD           CC  No Recipients

## 2019-05-07 ENCOUNTER — TELEPHONE (OUTPATIENT)
Dept: NEUROLOGY | Facility: CLINIC | Age: 64
End: 2019-05-07

## 2019-05-07 DIAGNOSIS — R20.0 RIGHT SIDED NUMBNESS: Primary | ICD-10-CM

## 2019-05-07 NOTE — TELEPHONE ENCOUNTER
----- Message from Lisa Celis RN sent at 5/7/2019  4:13 PM CDT -----  Regarding: neuro consult  Dr. Lugo placed a consult for this patient. Thanks.  Lisa

## 2019-06-21 ENCOUNTER — OFFICE VISIT (OUTPATIENT)
Dept: NEUROLOGY | Facility: CLINIC | Age: 64
End: 2019-06-21
Payer: MEDICAID

## 2019-06-21 VITALS
DIASTOLIC BLOOD PRESSURE: 67 MMHG | HEART RATE: 70 BPM | SYSTOLIC BLOOD PRESSURE: 152 MMHG | WEIGHT: 293 LBS | BODY MASS INDEX: 47.09 KG/M2 | HEIGHT: 66 IN

## 2019-06-21 DIAGNOSIS — G45.9 TIA (TRANSIENT ISCHEMIC ATTACK): Primary | ICD-10-CM

## 2019-06-21 PROCEDURE — 99215 PR OFFICE/OUTPT VISIT, EST, LEVL V, 40-54 MIN: ICD-10-PCS | Mod: S$PBB,,, | Performed by: PSYCHIATRY & NEUROLOGY

## 2019-06-21 PROCEDURE — 99214 OFFICE O/P EST MOD 30 MIN: CPT | Mod: PBBFAC | Performed by: PSYCHIATRY & NEUROLOGY

## 2019-06-21 PROCEDURE — 99215 OFFICE O/P EST HI 40 MIN: CPT | Mod: S$PBB,,, | Performed by: PSYCHIATRY & NEUROLOGY

## 2019-06-21 PROCEDURE — 99999 PR PBB SHADOW E&M-EST. PATIENT-LVL IV: ICD-10-PCS | Mod: PBBFAC,,, | Performed by: PSYCHIATRY & NEUROLOGY

## 2019-06-21 PROCEDURE — 99999 PR PBB SHADOW E&M-EST. PATIENT-LVL IV: CPT | Mod: PBBFAC,,, | Performed by: PSYCHIATRY & NEUROLOGY

## 2019-06-21 RX ORDER — CETIRIZINE HYDROCHLORIDE 10 MG/1
TABLET ORAL
Refills: 11 | COMMUNITY
Start: 2019-05-02

## 2019-06-21 RX ORDER — IBUPROFEN 400 MG/1
TABLET ORAL
Refills: 5 | COMMUNITY
Start: 2019-05-02 | End: 2019-10-01

## 2019-06-21 RX ORDER — FERROUS SULFATE 324(65)MG
TABLET, DELAYED RELEASE (ENTERIC COATED) ORAL
Refills: 5 | COMMUNITY
Start: 2019-05-02

## 2019-06-21 RX ORDER — FLUTICASONE PROPIONATE 50 MCG
2 SPRAY, SUSPENSION (ML) NASAL DAILY
Refills: 5 | COMMUNITY
Start: 2019-05-02

## 2019-06-21 RX ORDER — SIMVASTATIN 10 MG/1
TABLET, FILM COATED ORAL
COMMUNITY
Start: 2019-04-15 | End: 2019-09-30

## 2019-06-21 RX ORDER — LOSARTAN POTASSIUM AND HYDROCHLOROTHIAZIDE 12.5; 5 MG/1; MG/1
TABLET ORAL
COMMUNITY
Start: 2019-05-28 | End: 2019-09-30

## 2019-06-21 RX ORDER — CYANOCOBALAMIN 1000 UG/ML
INJECTION, SOLUTION INTRAMUSCULAR; SUBCUTANEOUS
COMMUNITY
Start: 2019-03-25

## 2019-06-21 RX ORDER — LISINOPRIL AND HYDROCHLOROTHIAZIDE 20; 25 MG/1; MG/1
TABLET ORAL
Refills: 5 | Status: ON HOLD | COMMUNITY
Start: 2019-05-02 | End: 2019-10-29 | Stop reason: CLARIF

## 2019-06-21 NOTE — LETTER
June 21, 2019      Luis Miguel Lugo MD  [address] [facility] [address]  [address]  [facility] [address]           [facility]  [address] [facility] [address]  [facility] [address]  Phone: [phone]  Fax: [phone]          Patient: Santa Plascencia   MR Number: [id number]   YOB: 1955   Date of Visit: 6/21/2019       Dear Dr. Luis Miguel Lugo:    Thank you for referring Santa Plascencia to me for evaluation. Attached you will find relevant portions of my assessment and plan of care.    If you have questions, please do not hesitate to call me. I look forward to following Santa Plascencia along with you.    Sincerely,    Caroline Morales,     Enclosure  CC:  No Recipients    If you would like to receive this communication electronically, please contact [email or web address] or [phone] to request more information on [employer] Link access.    For providers and/or their staff who would like to refer a patient to [facility], please contact us through our one-stop-shop provider referral line, [facility] , at [phone].    If you feel you have received this communication in error or would no longer like to receive these types of communications, please e-mail [email or web address]

## 2019-06-21 NOTE — PROGRESS NOTES
Neurology Follow Up Note    Chief Complaint: follow up from hospital for possible TIA    HPI:   Santa Plascencia is a 64 y.o. female with medical conditions as outlined below who presents as follow up from the hospital for possible TIA. Patient states in April of this year, she woke up and the right side of her face was swollen. She states the bottom of her right lip was numb. She went to the hospital and it was thought she had an allergic reaction, possibly due to Ace inhibitors. She states while she was in the hospital, she noticed her right arm became weak and numb. She denies headache, vertigo, change in speech, change in vision, difficulty walking or weakness in her right leg. She states after 4-5 hours, symptoms resolved and she was back to baseline. She had an MRI brain while in the hospital which was unremarkable. She had a carotid US which showed L ICA 50-69% stenosis. She had a CTA neck which showed mild beading of cervical carotid arteries possibly due to fibromuscular dysplasia, however, it showed no significant carotid stenosis. She saw Dr. Lugo (vascular surgery) who recommended conservative treatment with aspirin and statin. She states she was on a heart monitor while in the hospital and no arrhythmia was found. She had a TTE which showed concentric left ventricular remodeling, but was otherwise unremarkable. She states she is compliant with aspirin and statin. She has had no prior similar episodes, and has had no further episodes of focal weakness since discharge from the hospital. She denies getting headaches or a history of migraines. She has no further complaints.    Past Medical History:  Past Medical History:   Diagnosis Date    Asthma     Bronchitis     Hypertension        Past Surgical History:  Past Surgical History:   Procedure Laterality Date    ANKLE SURGERY       SECTION      CHOLECYSTECTOMY         Social History:  Social History     Socioeconomic History    Marital  status:      Spouse name: Not on file    Number of children: Not on file    Years of education: Not on file    Highest education level: Not on file   Occupational History    Not on file   Social Needs    Financial resource strain: Not on file    Food insecurity:     Worry: Not on file     Inability: Not on file    Transportation needs:     Medical: Not on file     Non-medical: Not on file   Tobacco Use    Smoking status: Never Smoker    Smokeless tobacco: Never Used   Substance and Sexual Activity    Alcohol use: No    Drug use: No    Sexual activity: Not on file   Lifestyle    Physical activity:     Days per week: Not on file     Minutes per session: Not on file    Stress: Not on file   Relationships    Social connections:     Talks on phone: Not on file     Gets together: Not on file     Attends Pent[employer]ism service: Not on file     Active member of club or organization: Not on file     Attends meetings of clubs or organizations: Not on file     Relationship status: Not on file   Other Topics Concern    Not on file   Social History Narrative    Not on file       Family History:  Family History   Problem Relation Age of Onset    Hypertension Mother     Stroke Mother        Medications:  Current Outpatient Medications   Medication Sig Dispense Refill    albuterol (ACCUNEB) 0.63 mg/3 mL Nebu Take 0.63 mg by nebulization every 6 (six) hours as needed.      amLODIPine (NORVASC) 5 MG tablet Take 1 tablet (5 mg total) by mouth once daily. 30 tablet 11    aspirin (ECOTRIN) 81 MG EC tablet Take 1 tablet (81 mg total) by mouth once daily.  0    atorvastatin (LIPITOR) 20 MG tablet Take 1 tablet (20 mg total) by mouth once daily. 90 tablet 3    cetirizine (ZYRTEC) 10 MG tablet ONE TABLET BY MOUTH AT BEDTIME  11    cyanocobalamin 1,000 mcg/mL injection       cyclobenzaprine (FEXMID) 7.5 MG Tab Take 10 mg by mouth 3 (three) times daily as needed.      ferrous gluconate (FERGON) 324 MG tablet Take  324 mg by mouth daily with breakfast.      ferrous sulfate 324 mg (65 mg iron) TbEC ONE TABLET BY MOUTH once a day  5    fluticasone propionate (FLONASE) 50 mcg/actuation nasal spray 2 sprays once daily. In each nostril  5    fluticasone-salmeterol 250-50 mcg/dose (ADVAIR) 250-50 mcg/dose diskus inhaler Inhale 1 puff into the lungs 2 (two) times daily.      FLUTICASONE/VILANTEROL (BREO ELLIPTA INHL) Inhale into the lungs.      hydroCHLOROthiazide (HYDRODIURIL) 25 MG tablet Take 1 tablet (25 mg total) by mouth once daily. 30 tablet 11     mg tablet ONE TABLET BY MOUTH once a day for 30 days  5    lisinopril-hydrochlorothiazide (PRINZIDE,ZESTORETIC) 20-25 mg Tab ONE TABLET BY MOUTH once a day  5    losartan-hydrochlorothiazide 50-12.5 mg (HYZAAR) 50-12.5 mg per tablet       simvastatin (ZOCOR) 10 MG tablet        No current facility-administered medications for this visit.        Allergies:  Review of patient's allergies indicates:   Allergen Reactions    Lisinopril Other (See Comments)     angioedema       ROS:  A 12 point review of system was negative aside from pertinent positives and negatives as outlined above.    Physical Exam  Vitals:    06/21/19 1414   BP: (!) 152/67   Pulse: 70       General: well nourished, well developed  Eyes: no scleral icterus   Nose: nasal turbinates intact  Neck: supple, ROM intact  Skin: no rash or ecchymosis  Joints: no swelling or erythema  Cardiac: regular rate and rhythm  Lungs: clear to auscultation bilaterally    Neuro:  Mental status: AAO x 3, no dysarthria, no aphasia, communicating appropriately  CN: PERRL, EOMI, VFF, V1-V3 sensation intact, no facial asymmetry, hearing grossly intact, tongue midline  Motor:   RUE 5/5  RLE 5/5  LUE 5/5  LLE 5/5    Normal bulk and tone    Reflexes: 2+ throughout, toes equivocal bilaterally  Sensory: intact to light touch throughout  Coordination: no dysmetria on FTN  Gait: steady    Prior  Imaging/Labs:  Reviewed      Assessment and Plan:    64 y.o. female with episode of right arm weakness and right facial swelling which could be 2/2 TIA given right arm weakness. Right facial swelling appears to be more consistent with allergic reaction, however, allergy would not explain right arm weakness. Patient has completed TIA workup which has been unrevealing. She was educated on the importance of good blood pressure, cholesterol and glucose control for stroke prevention. Given mild beading on CTA neck will obtain CTA head to further evaluate vasculature, and will see patient back following this.    1. TIA (transient ischemic attack)  C/w aspirin, statin  - CTA Head; Future            Patient was advised to notify me for worsening symptoms. She was advised to go to the ED for any new neurologic deficit. I will see patient back after CTA or sooner if necessary.       Caroline Morales DO  [facility]  [address] [facility] [address]  [place] [id number]  [phone]

## 2019-06-26 ENCOUNTER — EXTERNAL HOME HEALTH (OUTPATIENT)
Dept: HOME HEALTH SERVICES | Facility: HOSPITAL | Age: 64
End: 2019-06-26

## 2019-06-27 DIAGNOSIS — Z00.00 HEALTH CARE MAINTENANCE: Primary | ICD-10-CM

## 2019-07-11 ENCOUNTER — HOSPITAL ENCOUNTER (OUTPATIENT)
Dept: RADIOLOGY | Facility: HOSPITAL | Age: 64
Discharge: HOME | End: 2019-07-11
Attending: PSYCHIATRY & NEUROLOGY
Payer: MEDICAID

## 2019-07-11 DIAGNOSIS — R60.9 EDEMA: Primary | ICD-10-CM

## 2019-07-11 DIAGNOSIS — I82.409 DEEP VEIN THROMBOSIS (DVT): ICD-10-CM

## 2019-07-11 DIAGNOSIS — G45.9 TIA (TRANSIENT ISCHEMIC ATTACK): ICD-10-CM

## 2019-07-11 PROCEDURE — 25500020 PHARM REV CODE 255: Performed by: PSYCHIATRY & NEUROLOGY

## 2019-07-11 PROCEDURE — 70496 CT ANGIOGRAPHY HEAD: CPT | Mod: 26,,, | Performed by: RADIOLOGY

## 2019-07-11 PROCEDURE — 70496 CT ANGIOGRAPHY HEAD: CPT | Mod: TC

## 2019-07-11 PROCEDURE — 70496 CTA HEAD: ICD-10-PCS | Mod: 26,,, | Performed by: RADIOLOGY

## 2019-07-11 RX ADMIN — IOHEXOL 75 ML: 350 INJECTION, SOLUTION INTRAVENOUS at 01:07

## 2019-07-18 ENCOUNTER — HOSPITAL ENCOUNTER (OUTPATIENT)
Dept: RADIOLOGY | Facility: HOSPITAL | Age: 64
Discharge: HOME | End: 2019-07-18
Attending: FAMILY MEDICINE
Payer: MEDICAID

## 2019-07-18 ENCOUNTER — OFFICE VISIT (OUTPATIENT)
Dept: NEUROLOGY | Facility: CLINIC | Age: 64
End: 2019-07-18
Payer: MEDICAID

## 2019-07-18 VITALS
DIASTOLIC BLOOD PRESSURE: 64 MMHG | BODY MASS INDEX: 47.09 KG/M2 | HEIGHT: 66 IN | HEART RATE: 81 BPM | SYSTOLIC BLOOD PRESSURE: 132 MMHG | WEIGHT: 293 LBS

## 2019-07-18 DIAGNOSIS — R60.9 EDEMA: ICD-10-CM

## 2019-07-18 DIAGNOSIS — I82.409 DEEP VEIN THROMBOSIS (DVT): ICD-10-CM

## 2019-07-18 DIAGNOSIS — G45.9 TIA (TRANSIENT ISCHEMIC ATTACK): Primary | ICD-10-CM

## 2019-07-18 PROCEDURE — 99213 OFFICE O/P EST LOW 20 MIN: CPT | Mod: S$PBB,,, | Performed by: PSYCHIATRY & NEUROLOGY

## 2019-07-18 PROCEDURE — 99213 PR OFFICE/OUTPT VISIT, EST, LEVL III, 20-29 MIN: ICD-10-PCS | Mod: S$PBB,,, | Performed by: PSYCHIATRY & NEUROLOGY

## 2019-07-18 PROCEDURE — 93971 EXTREMITY STUDY: CPT | Mod: TC,LT

## 2019-07-18 PROCEDURE — 99999 PR PBB SHADOW E&M-EST. PATIENT-LVL III: CPT | Mod: PBBFAC,,, | Performed by: PSYCHIATRY & NEUROLOGY

## 2019-07-18 PROCEDURE — 99213 OFFICE O/P EST LOW 20 MIN: CPT | Mod: PBBFAC,25 | Performed by: PSYCHIATRY & NEUROLOGY

## 2019-07-18 PROCEDURE — 99999 PR PBB SHADOW E&M-EST. PATIENT-LVL III: ICD-10-PCS | Mod: PBBFAC,,, | Performed by: PSYCHIATRY & NEUROLOGY

## 2019-07-18 PROCEDURE — 93971 US LOWER EXTREMITY VEINS LEFT: ICD-10-PCS | Mod: 26,LT,, | Performed by: RADIOLOGY

## 2019-07-18 PROCEDURE — 93971 EXTREMITY STUDY: CPT | Mod: 26,LT,, | Performed by: RADIOLOGY

## 2019-07-18 NOTE — PROGRESS NOTES
Neurology Follow Up Note    Chief Complaint: follow up CTA results    Interval history:  Since last visit, patient has had no recurrent episodes of right upper extremity weakness. She denies having recent headaches, change in speech, focal numbness, focal weakness, vertigo, or difficulty walking. She had a CTA of the head which was unremarkable. She is compliant with aspirin and statin. She has no further complaints.      Initial Visit 6/21/19:  Santa Plascencia is a 64 y.o. female with medical conditions as outlined below who presents as follow up from the hospital for possible TIA. Patient states in April of this year, she woke up and the right side of her face was swollen. She states the bottom of her right lip was numb. She went to the hospital and it was thought she had an allergic reaction, possibly due to Ace inhibitors. She states while she was in the hospital, she noticed her right arm became weak and numb. She denies headache, vertigo, change in speech, change in vision, difficulty walking or weakness in her right leg. She states after 4-5 hours, symptoms resolved and she was back to baseline. She had an MRI brain while in the hospital which was unremarkable. She had a carotid US which showed L ICA 50-69% stenosis. She had a CTA neck which showed mild beading of cervical carotid arteries possibly due to fibromuscular dysplasia, however, it showed no significant carotid stenosis. She saw Dr. Lugo (vascular surgery) who recommended conservative treatment with aspirin and statin. She states she was on a heart monitor while in the hospital and no arrhythmia was found. She had a TTE which showed concentric left ventricular remodeling, but was otherwise unremarkable. She states she is compliant with aspirin and statin. She has had no prior similar episodes, and has had no further episodes of focal weakness since discharge from the hospital. She denies getting headaches or a history of migraines. She has no  further complaints.    Past Medical History:  Past Medical History:   Diagnosis Date    Asthma     Bronchitis     Hypertension        Past Surgical History:  Past Surgical History:   Procedure Laterality Date    ANKLE SURGERY       SECTION      CHOLECYSTECTOMY         Social History:  Social History     Socioeconomic History    Marital status:      Spouse name: Not on file    Number of children: Not on file    Years of education: Not on file    Highest education level: Not on file   Occupational History    Not on file   Social Needs    Financial resource strain: Not on file    Food insecurity:     Worry: Not on file     Inability: Not on file    Transportation needs:     Medical: Not on file     Non-medical: Not on file   Tobacco Use    Smoking status: Never Smoker    Smokeless tobacco: Never Used   Substance and Sexual Activity    Alcohol use: No    Drug use: No    Sexual activity: Not on file   Lifestyle    Physical activity:     Days per week: Not on file     Minutes per session: Not on file    Stress: Not on file   Relationships    Social connections:     Talks on phone: Not on file     Gets together: Not on file     Attends Hoahaoism service: Not on file     Active member of club or organization: Not on file     Attends meetings of clubs or organizations: Not on file     Relationship status: Not on file   Other Topics Concern    Not on file   Social History Narrative    Not on file       Family History:  Family History   Problem Relation Age of Onset    Hypertension Mother     Stroke Mother        Medications:  Current Outpatient Medications   Medication Sig Dispense Refill    albuterol (ACCUNEB) 0.63 mg/3 mL Nebu Take 0.63 mg by nebulization every 6 (six) hours as needed.      amLODIPine (NORVASC) 5 MG tablet Take 1 tablet (5 mg total) by mouth once daily. 30 tablet 11    aspirin (ECOTRIN) 81 MG EC tablet Take 1 tablet (81 mg total) by mouth once daily.  0     atorvastatin (LIPITOR) 20 MG tablet Take 1 tablet (20 mg total) by mouth once daily. 90 tablet 3    cetirizine (ZYRTEC) 10 MG tablet ONE TABLET BY MOUTH AT BEDTIME  11    cyanocobalamin 1,000 mcg/mL injection       cyclobenzaprine (FEXMID) 7.5 MG Tab Take 10 mg by mouth 3 (three) times daily as needed.      ferrous gluconate (FERGON) 324 MG tablet Take 324 mg by mouth daily with breakfast.      ferrous sulfate 324 mg (65 mg iron) TbEC ONE TABLET BY MOUTH once a day  5    fluticasone propionate (FLONASE) 50 mcg/actuation nasal spray 2 sprays once daily. In each nostril  5    fluticasone-salmeterol 250-50 mcg/dose (ADVAIR) 250-50 mcg/dose diskus inhaler Inhale 1 puff into the lungs 2 (two) times daily.      FLUTICASONE/VILANTEROL (BREO ELLIPTA INHL) Inhale into the lungs.      hydroCHLOROthiazide (HYDRODIURIL) 25 MG tablet Take 1 tablet (25 mg total) by mouth once daily. 30 tablet 11     mg tablet ONE TABLET BY MOUTH once a day for 30 days  5    lisinopril-hydrochlorothiazide (PRINZIDE,ZESTORETIC) 20-25 mg Tab ONE TABLET BY MOUTH once a day  5    losartan-hydrochlorothiazide 50-12.5 mg (HYZAAR) 50-12.5 mg per tablet       simvastatin (ZOCOR) 10 MG tablet        No current facility-administered medications for this visit.        Allergies:  Review of patient's allergies indicates:   Allergen Reactions    Lisinopril Other (See Comments)     angioedema       ROS:  A 12 point review of system was negative aside from pertinent positives and negatives as outlined above.    Physical Exam  Vitals:    07/18/19 1522   BP: 132/64   Pulse: 81       General: well nourished, well developed  Eyes: no scleral icterus   Nose: nasal turbinates intact  Neck: supple, ROM intact  Skin: no rash or ecchymosis  Joints: no swelling or erythema  Cardiac: regular rate and rhythm  Lungs: clear to auscultation bilaterally    Neuro:  Mental status: AAO x 3, no dysarthria, no aphasia, communicating appropriately  CN: PERRL,  EOMI, VFF, V1-V3 sensation intact, no facial asymmetry, hearing grossly intact, tongue midline  Motor:   RUE 5/5  RLE 5/5  LUE 5/5  LLE 5/5    Normal bulk and tone    Reflexes: 2+ throughout, toes equivocal bilaterally  Sensory: intact to light touch throughout  Coordination: no dysmetria on FTN  Gait: steady    Prior Imaging/Labs:  Reviewed      Assessment and Plan:    64 y.o. female with episode of right arm weakness ( in April 2019) and right facial swelling which could be 2/2 TIA given right arm weakness. Right facial swelling appears to be more consistent with allergic reaction, however, allergy would not explain right arm weakness. Patient has completed TIA workup which has been unrevealing. She was educated on the importance of good blood pressure, cholesterol and glucose control for stroke prevention.    1. TIA (transient ischemic attack)  C/w aspirin, statin            Patient was advised to notify me for worsening symptoms.  I will see patient back in 6 months or sooner if necessary.       Caroline Morales DO  [facility]  [address] [facility]  [place] [id number] [phone]

## 2019-09-12 RX ORDER — IBUPROFEN 400 MG/1
TABLET ORAL
Qty: 30 TABLET | OUTPATIENT
Start: 2019-09-12

## 2019-09-12 RX ORDER — FERROUS SULFATE 324(65)MG
TABLET, DELAYED RELEASE (ENTERIC COATED) ORAL
Qty: 30 TABLET | OUTPATIENT
Start: 2019-09-12

## 2019-09-19 RX ORDER — CYANOCOBALAMIN 1000 UG/ML
INJECTION, SOLUTION INTRAMUSCULAR; SUBCUTANEOUS
Qty: 10 ML | OUTPATIENT
Start: 2019-09-19

## 2019-09-19 RX ORDER — CYCLOBENZAPRINE HCL 10 MG
TABLET ORAL
Qty: 30 TABLET | OUTPATIENT
Start: 2019-09-19

## 2019-09-30 ENCOUNTER — HOSPITAL ENCOUNTER (EMERGENCY)
Facility: HOSPITAL | Age: 64
Discharge: HOME | End: 2019-10-01
Attending: EMERGENCY MEDICINE
Payer: MEDICAID

## 2019-09-30 DIAGNOSIS — M79.671 FOOT PAIN, RIGHT: ICD-10-CM

## 2019-09-30 DIAGNOSIS — M79.671 RIGHT FOOT PAIN: Primary | ICD-10-CM

## 2019-09-30 PROCEDURE — 99283 EMERGENCY DEPT VISIT LOW MDM: CPT | Mod: 25

## 2019-09-30 RX ORDER — COLCHICINE 0.6 MG/1
0.6 TABLET ORAL DAILY
COMMUNITY

## 2019-09-30 RX ORDER — NAPROXEN 500 MG/1
500 TABLET ORAL
Status: DISCONTINUED | OUTPATIENT
Start: 2019-10-01 | End: 2019-10-01 | Stop reason: HOSPADM

## 2019-10-01 VITALS
HEART RATE: 85 BPM | BODY MASS INDEX: 47.09 KG/M2 | HEIGHT: 66 IN | WEIGHT: 293 LBS | SYSTOLIC BLOOD PRESSURE: 176 MMHG | DIASTOLIC BLOOD PRESSURE: 85 MMHG | TEMPERATURE: 98 F | RESPIRATION RATE: 15 BRPM | OXYGEN SATURATION: 98 %

## 2019-10-01 RX ORDER — NAPROXEN 500 MG/1
500 TABLET ORAL 2 TIMES DAILY WITH MEALS
Qty: 20 TABLET | Refills: 0 | Status: SHIPPED | OUTPATIENT
Start: 2019-10-01 | End: 2019-10-11

## 2019-10-01 NOTE — ED PROVIDER NOTES
Encounter Date: 2019    SCRIBE #1 NOTE: I, Yuliana Stephens, am scribing for, and in the presence of,  Willem Scott MD. I have scribed the following portions of the note - Other sections scribed: HPI, ROS, PE, ED Management .       History     Chief Complaint   Patient presents with    Foot Swelling     Pt here with c/o pain and swelling to right ankle x 2 days, pt denies injury. Pt reports she is unable to walk on her foot since this evening.     Cough     Pt also c/o productive cough x 1 week.     CC: Foot Swelling    HPI: The patient is a 64 y.o female who presents to the ED complaining of RLE foot and ankle pain and swelling that began tonight. Pain to mid plantar aspect. She states that she is unable to bear weight on the right foot. She denies any recent falls or trauma in to her foot. Hx of similar Sx 2 months ago, for which she was given a cortisone shot, and reports relief. Patient states that she thought the pain was gout related, and admits to taking colchicine, without relief. No exacerbating or alleviating factors. No Hx of DM. PSHx to left ankle. NKDA.     The history is provided by the patient. No  was used.     Review of patient's allergies indicates:   Allergen Reactions    Lisinopril Other (See Comments)     angioedema     Past Medical History:   Diagnosis Date    Asthma     Bronchitis     Hypertension      Past Surgical History:   Procedure Laterality Date    ANKLE SURGERY       SECTION      CHOLECYSTECTOMY       Family History   Problem Relation Age of Onset    Hypertension Mother     Stroke Mother      Social History     Tobacco Use    Smoking status: Never Smoker    Smokeless tobacco: Never Used   Substance Use Topics    Alcohol use: No    Drug use: No     Review of Systems   Constitutional: Negative for fever.   HENT: Negative for congestion.    Respiratory: Negative for cough.    Cardiovascular: Negative for chest pain.   Gastrointestinal:  Negative for abdominal pain.   Genitourinary: Negative for difficulty urinating.   Musculoskeletal: Positive for arthralgias (Left ankle ) and myalgias (Left foot pain ).   Skin: Negative for rash and wound.   Neurological: Negative for weakness.   Psychiatric/Behavioral: Negative for confusion.       Physical Exam     Initial Vitals [09/30/19 2309]   BP Pulse Resp Temp SpO2   (!) 196/83 71 18 98 °F (36.7 °C) 98 %      MAP       --         Physical Exam    Nursing note and vitals reviewed.  Constitutional: She appears well-developed and well-nourished. She is not diaphoretic. No distress.   HENT:   Head: Normocephalic and atraumatic.   Eyes: EOM are normal. Pupils are equal, round, and reactive to light.   Neck: Normal range of motion. Neck supple.   Cardiovascular: Normal rate, regular rhythm and normal pulses.   Pulses:       Dorsalis pedis pulses are 2+ on the right side, and 2+ on the left side.   DP pulses normal.    Pulmonary/Chest: Breath sounds normal.   Abdominal: Soft. There is no tenderness.   Musculoskeletal: Normal range of motion. She exhibits tenderness. She exhibits no edema.   Tenderness to mid plant aspect.    Neurological: She is alert and oriented to person, place, and time.   Skin: Skin is warm and dry.   Psychiatric: She has a normal mood and affect.         ED Course   Procedures  Labs Reviewed - No data to display       Imaging Results          X-Ray Foot Complete Right (Final result)  Result time 10/01/19 00:10:32    Final result by Erica Leon MD (10/01/19 00:10:32)                 Impression:      No acute osseous abnormality identified.      Electronically signed by: Erica Leon MD  Date:    10/01/2019  Time:    00:10             Narrative:    EXAMINATION:  XR FOOT COMPLETE 3 VIEW RIGHT    CLINICAL HISTORY:  . Pain in right foot    TECHNIQUE:  AP, lateral, and oblique views of the right foot were performed.    COMPARISON:  None    FINDINGS:  No evidence of acute displaced  fracture, dislocation, or osseous destructive process.  Lisfranc articulation is congruent.  No radiopaque retained foreign body seen.                                 Medical Decision Making:   History:   Old Medical Records: I decided to obtain old medical records.  Clinical Tests:   Radiological Study: Reviewed and Ordered  ED Management:  23:41 Will order Xray of right foot.    Pt arrived alert, afebrile, non-toxic in appearance, in no acute respiratory distress with VSS.  PE unremarkable except for mild TTP along the plantar aspect of the midfoot.  XR's negative.  Pt given a walking boot for comfort and counseled on the need to wear footwear with adequate support.  Pt discharged and counseled on the need to return to the nearest emergency room if they experience any other concerning symptoms.  Pt counseled to F/U outpatient with a PCP over the next two to three days.    Willem Scott MD                    Scribe Attestation:   Scribe #1: I performed the above scribed service and the documentation accurately describes the services I performed. I attest to the accuracy of the note.          I, Willem Scott, personally performed the services described in this documentation. All medical record entries made by the scribe were at my direction and in my presence.  I have reviewed the chart and agree that the record reflects my personal performance and is accurate and complete.         Clinical Impression:       ICD-10-CM ICD-9-CM   1. Right foot pain M79.671 729.5   2. Foot pain, right M79.671 729.5                                Willem Scott MD  10/01/19 4545

## 2019-10-01 NOTE — ED TRIAGE NOTES
Patient reports increased pain and swelling to right foot for the past 2 days. Denies injury. Swelling and tenderness noted. Pulses present. Patient reports unable to walk on foot. Patient report taking IBU for pain w/o relief.

## 2019-10-29 ENCOUNTER — HOSPITAL ENCOUNTER (OUTPATIENT)
Facility: HOSPITAL | Age: 64
Discharge: HOME | End: 2019-10-30
Attending: EMERGENCY MEDICINE | Admitting: EMERGENCY MEDICINE
Payer: MEDICAID

## 2019-10-29 DIAGNOSIS — R07.9 CHEST PAIN: ICD-10-CM

## 2019-10-29 DIAGNOSIS — I21.4 NSTEMI (NON-ST ELEVATED MYOCARDIAL INFARCTION): Primary | ICD-10-CM

## 2019-10-29 PROBLEM — E87.6 HYPOKALEMIA: Status: ACTIVE | Noted: 2019-10-29

## 2019-10-29 LAB
ALBUMIN SERPL BCP-MCNC: 3.3 G/DL (ref 3.5–5.2)
ALP SERPL-CCNC: 116 U/L (ref 55–135)
ALT SERPL W/O P-5'-P-CCNC: 26 U/L (ref 10–44)
ANION GAP SERPL CALC-SCNC: 8 MMOL/L (ref 8–16)
APTT BLDCRRT: 32.1 SEC (ref 21–32)
AST SERPL-CCNC: 57 U/L (ref 10–40)
BASOPHILS # BLD AUTO: 0.03 K/UL (ref 0–0.2)
BASOPHILS NFR BLD: 0.4 % (ref 0–1.9)
BILIRUB SERPL-MCNC: 1 MG/DL (ref 0.1–1)
BNP SERPL-MCNC: 45 PG/ML (ref 0–99)
BNP SERPL-MCNC: 45 PG/ML (ref 0–99)
BUN SERPL-MCNC: 17 MG/DL (ref 8–23)
CALCIUM SERPL-MCNC: 8.7 MG/DL (ref 8.7–10.5)
CHLORIDE SERPL-SCNC: 102 MMOL/L (ref 95–110)
CO2 SERPL-SCNC: 32 MMOL/L (ref 23–29)
CREAT SERPL-MCNC: 1 MG/DL (ref 0.5–1.4)
DIFFERENTIAL METHOD: ABNORMAL
EOSINOPHIL # BLD AUTO: 0.1 K/UL (ref 0–0.5)
EOSINOPHIL NFR BLD: 1 % (ref 0–8)
ERYTHROCYTE [DISTWIDTH] IN BLOOD BY AUTOMATED COUNT: 13 % (ref 11.5–14.5)
EST. GFR  (AFRICAN AMERICAN): >60 ML/MIN/1.73 M^2
EST. GFR  (NON AFRICAN AMERICAN): >60 ML/MIN/1.73 M^2
GLUCOSE SERPL-MCNC: 97 MG/DL (ref 70–110)
HCT VFR BLD AUTO: 38.6 % (ref 37–48.5)
HGB BLD-MCNC: 12.3 G/DL (ref 12–16)
IMM GRANULOCYTES # BLD AUTO: 0.02 K/UL (ref 0–0.04)
IMM GRANULOCYTES NFR BLD AUTO: 0.2 % (ref 0–0.5)
INR PPP: 1.1 (ref 0.8–1.2)
LYMPHOCYTES # BLD AUTO: 1.7 K/UL (ref 1–4.8)
LYMPHOCYTES NFR BLD: 20.5 % (ref 18–48)
MCH RBC QN AUTO: 30.7 PG (ref 27–31)
MCHC RBC AUTO-ENTMCNC: 31.9 G/DL (ref 32–36)
MCV RBC AUTO: 96 FL (ref 82–98)
MONOCYTES # BLD AUTO: 0.4 K/UL (ref 0.3–1)
MONOCYTES NFR BLD: 5.5 % (ref 4–15)
NEUTROPHILS # BLD AUTO: 5.8 K/UL (ref 1.8–7.7)
NEUTROPHILS NFR BLD: 72.4 % (ref 38–73)
NRBC BLD-RTO: 0 /100 WBC
PLATELET # BLD AUTO: 230 K/UL (ref 150–350)
PMV BLD AUTO: 11.6 FL (ref 9.2–12.9)
POTASSIUM SERPL-SCNC: 3 MMOL/L (ref 3.5–5.1)
PROT SERPL-MCNC: 7.3 G/DL (ref 6–8.4)
PROTHROMBIN TIME: 11.2 SEC (ref 9–12.5)
RBC # BLD AUTO: 4.01 M/UL (ref 4–5.4)
SODIUM SERPL-SCNC: 142 MMOL/L (ref 136–145)
TROPONIN I SERPL DL<=0.01 NG/ML-MCNC: 0.01 NG/ML (ref 0–0.03)
TROPONIN I SERPL DL<=0.01 NG/ML-MCNC: 0.03 NG/ML (ref 0–0.03)
WBC # BLD AUTO: 8.03 K/UL (ref 3.9–12.7)

## 2019-10-29 PROCEDURE — G0378 HOSPITAL OBSERVATION PER HR: HCPCS

## 2019-10-29 PROCEDURE — 93010 ELECTROCARDIOGRAM REPORT: CPT | Mod: 76,,, | Performed by: INTERNAL MEDICINE

## 2019-10-29 PROCEDURE — 83880 ASSAY OF NATRIURETIC PEPTIDE: CPT

## 2019-10-29 PROCEDURE — 25000003 PHARM REV CODE 250: Performed by: EMERGENCY MEDICINE

## 2019-10-29 PROCEDURE — 85610 PROTHROMBIN TIME: CPT

## 2019-10-29 PROCEDURE — 93005 ELECTROCARDIOGRAM TRACING: CPT

## 2019-10-29 PROCEDURE — 85025 COMPLETE CBC W/AUTO DIFF WBC: CPT

## 2019-10-29 PROCEDURE — 63600175 PHARM REV CODE 636 W HCPCS: Performed by: EMERGENCY MEDICINE

## 2019-10-29 PROCEDURE — 93010 EKG 12-LEAD: ICD-10-PCS | Mod: 76,,, | Performed by: INTERNAL MEDICINE

## 2019-10-29 PROCEDURE — 85730 THROMBOPLASTIN TIME PARTIAL: CPT

## 2019-10-29 PROCEDURE — 21400001 HC TELEMETRY ROOM

## 2019-10-29 PROCEDURE — 84484 ASSAY OF TROPONIN QUANT: CPT

## 2019-10-29 PROCEDURE — 36000 PLACE NEEDLE IN VEIN: CPT

## 2019-10-29 PROCEDURE — 99285 EMERGENCY DEPT VISIT HI MDM: CPT | Mod: 25

## 2019-10-29 PROCEDURE — 80053 COMPREHEN METABOLIC PANEL: CPT

## 2019-10-29 RX ORDER — POTASSIUM CHLORIDE 20 MEQ/15ML
40 SOLUTION ORAL
Status: COMPLETED | OUTPATIENT
Start: 2019-10-29 | End: 2019-10-29

## 2019-10-29 RX ORDER — ASPIRIN 325 MG
325 TABLET ORAL
Status: DISPENSED | OUTPATIENT
Start: 2019-10-29 | End: 2019-10-30

## 2019-10-29 RX ORDER — NITROGLYCERIN 0.4 MG/1
0.4 TABLET SUBLINGUAL
Status: COMPLETED | OUTPATIENT
Start: 2019-10-29 | End: 2019-10-29

## 2019-10-29 RX ORDER — CLOPIDOGREL 300 MG/1
600 TABLET, FILM COATED ORAL
Status: COMPLETED | OUTPATIENT
Start: 2019-10-29 | End: 2019-10-29

## 2019-10-29 RX ORDER — HEPARIN SODIUM,PORCINE/D5W 25000/250
12 INTRAVENOUS SOLUTION INTRAVENOUS CONTINUOUS
Status: DISCONTINUED | OUTPATIENT
Start: 2019-10-29 | End: 2019-10-30

## 2019-10-29 RX ORDER — SODIUM CHLORIDE 0.9 % (FLUSH) 0.9 %
10 SYRINGE (ML) INJECTION
Status: DISCONTINUED | OUTPATIENT
Start: 2019-10-29 | End: 2019-10-30 | Stop reason: HOSPADM

## 2019-10-29 RX ADMIN — NITROGLYCERIN 0.4 MG: 0.4 TABLET SUBLINGUAL at 06:10

## 2019-10-29 RX ADMIN — CLOPIDOGREL BISULFATE 600 MG: 300 TABLET, FILM COATED ORAL at 08:10

## 2019-10-29 RX ADMIN — POTASSIUM CHLORIDE 40 MEQ: 20 SOLUTION ORAL at 08:10

## 2019-10-29 RX ADMIN — HEPARIN SODIUM 12 UNITS/KG/HR: 10000 INJECTION, SOLUTION INTRAVENOUS at 08:10

## 2019-10-29 NOTE — ED NOTES
64 year old female to ED for epigastric pain that radiates to abdomen. Patient states pain occurred earlier and caused diaphoresis. Pain is intermittent sharp and has no alleviating or worsening symptoms. ED workup initiated.

## 2019-10-30 VITALS
OXYGEN SATURATION: 100 % | TEMPERATURE: 98 F | RESPIRATION RATE: 18 BRPM | WEIGHT: 293 LBS | HEIGHT: 67 IN | HEART RATE: 68 BPM | BODY MASS INDEX: 45.99 KG/M2 | DIASTOLIC BLOOD PRESSURE: 76 MMHG | SYSTOLIC BLOOD PRESSURE: 117 MMHG

## 2019-10-30 PROBLEM — R07.2 PRECORDIAL PAIN: Status: ACTIVE | Noted: 2019-10-29

## 2019-10-30 PROBLEM — R07.9 CHEST PAIN: Status: ACTIVE | Noted: 2019-10-30

## 2019-10-30 LAB
ALBUMIN SERPL BCP-MCNC: 3.1 G/DL (ref 3.5–5.2)
ALP SERPL-CCNC: 110 U/L (ref 55–135)
ALT SERPL W/O P-5'-P-CCNC: 67 U/L (ref 10–44)
ANION GAP SERPL CALC-SCNC: 8 MMOL/L (ref 8–16)
AORTIC ROOT ANNULUS: 2.88 CM
AORTIC VALVE CUSP SEPERATION: 2.04 CM
APTT BLDCRRT: 118.2 SEC (ref 21–32)
APTT BLDCRRT: >150 SEC (ref 21–32)
ASCENDING AORTA: 2.96 CM
AST SERPL-CCNC: 105 U/L (ref 10–40)
AV INDEX (PROSTH): 0.72
AV MEAN GRADIENT: 6 MMHG
AV PEAK GRADIENT: 10 MMHG
AV VALVE AREA: 2.9 CM2
AV VELOCITY RATIO: 0.69
BASOPHILS # BLD AUTO: 0.02 K/UL (ref 0–0.2)
BASOPHILS # BLD AUTO: 0.02 K/UL (ref 0–0.2)
BASOPHILS NFR BLD: 0.2 % (ref 0–1.9)
BASOPHILS NFR BLD: 0.2 % (ref 0–1.9)
BILIRUB SERPL-MCNC: 1 MG/DL (ref 0.1–1)
BSA FOR ECHO PROCEDURE: 2.54 M2
BUN SERPL-MCNC: 12 MG/DL (ref 8–23)
CALCIUM SERPL-MCNC: 8.7 MG/DL (ref 8.7–10.5)
CHLORIDE SERPL-SCNC: 105 MMOL/L (ref 95–110)
CO2 SERPL-SCNC: 29 MMOL/L (ref 23–29)
CREAT SERPL-MCNC: 0.9 MG/DL (ref 0.5–1.4)
CV ECHO LV RWT: 0.41 CM
CV STRESS BASE HR: 64 BPM
DIASTOLIC BLOOD PRESSURE: 87 MMHG
DIFFERENTIAL METHOD: ABNORMAL
DIFFERENTIAL METHOD: ABNORMAL
DOP CALC AO PEAK VEL: 1.56 M/S
DOP CALC AO VTI: 34.17 CM
DOP CALC LVOT AREA: 4 CM2
DOP CALC LVOT DIAMETER: 2.26 CM
DOP CALC LVOT PEAK VEL: 1.08 M/S
DOP CALC LVOT STROKE VOLUME: 99.07 CM3
DOP CALCLVOT PEAK VEL VTI: 24.71 CM
E WAVE DECELERATION TIME: 185.69 MSEC
E/A RATIO: 1.1
E/E' RATIO: 12.5 M/S
ECHO LV POSTERIOR WALL: 1.1 CM (ref 0.6–1.1)
EOSINOPHIL # BLD AUTO: 0.1 K/UL (ref 0–0.5)
EOSINOPHIL # BLD AUTO: 0.1 K/UL (ref 0–0.5)
EOSINOPHIL NFR BLD: 0.6 % (ref 0–8)
EOSINOPHIL NFR BLD: 1 % (ref 0–8)
ERYTHROCYTE [DISTWIDTH] IN BLOOD BY AUTOMATED COUNT: 12.7 % (ref 11.5–14.5)
ERYTHROCYTE [DISTWIDTH] IN BLOOD BY AUTOMATED COUNT: 13 % (ref 11.5–14.5)
EST. GFR  (AFRICAN AMERICAN): >60 ML/MIN/1.73 M^2
EST. GFR  (NON AFRICAN AMERICAN): >60 ML/MIN/1.73 M^2
FRACTIONAL SHORTENING: 42 % (ref 28–44)
GLUCOSE SERPL-MCNC: 100 MG/DL (ref 70–110)
HCT VFR BLD AUTO: 36.8 % (ref 37–48.5)
HCT VFR BLD AUTO: 37.3 % (ref 37–48.5)
HGB BLD-MCNC: 11.9 G/DL (ref 12–16)
HGB BLD-MCNC: 12.1 G/DL (ref 12–16)
IMM GRANULOCYTES # BLD AUTO: 0.02 K/UL (ref 0–0.04)
IMM GRANULOCYTES # BLD AUTO: 0.02 K/UL (ref 0–0.04)
IMM GRANULOCYTES NFR BLD AUTO: 0.2 % (ref 0–0.5)
IMM GRANULOCYTES NFR BLD AUTO: 0.2 % (ref 0–0.5)
INTERVENTRICULAR SEPTUM: 1.05 CM (ref 0.6–1.1)
IVRT: 0.11 MSEC
LA MAJOR: 5.54 CM
LA MINOR: 5.11 CM
LA WIDTH: 4.5 CM
LEFT ATRIUM SIZE: 3.92 CM
LEFT ATRIUM VOLUME INDEX: 32.9 ML/M2
LEFT ATRIUM VOLUME: 79.71 CM3
LEFT INTERNAL DIMENSION IN SYSTOLE: 3.09 CM (ref 2.1–4)
LEFT VENTRICLE DIASTOLIC VOLUME INDEX: 57.3 ML/M2
LEFT VENTRICLE DIASTOLIC VOLUME: 138.94 ML
LEFT VENTRICLE MASS INDEX: 93 G/M2
LEFT VENTRICLE SYSTOLIC VOLUME INDEX: 15.5 ML/M2
LEFT VENTRICLE SYSTOLIC VOLUME: 37.7 ML
LEFT VENTRICULAR INTERNAL DIMENSION IN DIASTOLE: 5.36 CM (ref 3.5–6)
LEFT VENTRICULAR MASS: 224.89 G
LV LATERAL E/E' RATIO: 11.11 M/S
LV SEPTAL E/E' RATIO: 14.29 M/S
LYMPHOCYTES # BLD AUTO: 2.4 K/UL (ref 1–4.8)
LYMPHOCYTES # BLD AUTO: 2.4 K/UL (ref 1–4.8)
LYMPHOCYTES NFR BLD: 26.5 % (ref 18–48)
LYMPHOCYTES NFR BLD: 29.8 % (ref 18–48)
MCH RBC QN AUTO: 30.8 PG (ref 27–31)
MCH RBC QN AUTO: 31 PG (ref 27–31)
MCHC RBC AUTO-ENTMCNC: 32.3 G/DL (ref 32–36)
MCHC RBC AUTO-ENTMCNC: 32.4 G/DL (ref 32–36)
MCV RBC AUTO: 95 FL (ref 82–98)
MCV RBC AUTO: 96 FL (ref 82–98)
MONOCYTES # BLD AUTO: 0.3 K/UL (ref 0.3–1)
MONOCYTES # BLD AUTO: 0.3 K/UL (ref 0.3–1)
MONOCYTES NFR BLD: 3.5 % (ref 4–15)
MONOCYTES NFR BLD: 4 % (ref 4–15)
MV PEAK A VEL: 0.91 M/S
MV PEAK E VEL: 1 M/S
NEUTROPHILS # BLD AUTO: 5.2 K/UL (ref 1.8–7.7)
NEUTROPHILS # BLD AUTO: 6.2 K/UL (ref 1.8–7.7)
NEUTROPHILS NFR BLD: 64.8 % (ref 38–73)
NEUTROPHILS NFR BLD: 69 % (ref 38–73)
NRBC BLD-RTO: 0 /100 WBC
NRBC BLD-RTO: 0 /100 WBC
NUC STRESS DIASTOLIC VOLUME INDEX: 67
NUC STRESS EJECTION FRACTION: 64 %
NUC STRESS SYSTOLIC VOLUME INDEX: 24
OHS CV CPX 85 PERCENT MAX PREDICTED HEART RATE MALE: 128
OHS CV CPX MAX PREDICTED HEART RATE: 151
OHS CV CPX PATIENT IS FEMALE: 1
OHS CV CPX PATIENT IS MALE: 0
OHS CV CPX PEAK DIASTOLIC BLOOD PRESSURE: 88 MMHG
OHS CV CPX PEAK HEAR RATE: 123 BPM
OHS CV CPX PEAK RATE PRESSURE PRODUCT: NORMAL
OHS CV CPX PEAK SYSTOLIC BLOOD PRESSURE: 178 MMHG
OHS CV CPX PERCENT MAX PREDICTED HEART RATE ACHIEVED: 82
OHS CV CPX RATE PRESSURE PRODUCT PRESENTING: NORMAL
PISA TR MAX VEL: 2.68 M/S
PLATELET # BLD AUTO: 210 K/UL (ref 150–350)
PLATELET # BLD AUTO: 220 K/UL (ref 150–350)
PMV BLD AUTO: 11.1 FL (ref 9.2–12.9)
PMV BLD AUTO: 11.9 FL (ref 9.2–12.9)
POTASSIUM SERPL-SCNC: 3.4 MMOL/L (ref 3.5–5.1)
PROT SERPL-MCNC: 6.8 G/DL (ref 6–8.4)
PULM VEIN S/D RATIO: 1.53
PV PEAK D VEL: 0.34 M/S
PV PEAK S VEL: 0.52 M/S
PV PEAK VELOCITY: 1.27 CM/S
RA MAJOR: 5.15 CM
RA PRESSURE: 3 MMHG
RA WIDTH: 3.26 CM
RBC # BLD AUTO: 3.84 M/UL (ref 4–5.4)
RBC # BLD AUTO: 3.93 M/UL (ref 4–5.4)
RIGHT VENTRICULAR END-DIASTOLIC DIMENSION: 3.53 CM
RV TISSUE DOPPLER FREE WALL SYSTOLIC VELOCITY 1 (APICAL 4 CHAMBER VIEW): 13.41 CM/S
SINUS: 2.71 CM
SODIUM SERPL-SCNC: 142 MMOL/L (ref 136–145)
STJ: 2.5 CM
STRESS ECHO TARGET HR: 133 BPM
SYSTOLIC BLOOD PRESSURE: 164 MMHG
TDI LATERAL: 0.09 M/S
TDI SEPTAL: 0.07 M/S
TDI: 0.08 M/S
TR MAX PG: 29 MMHG
TRICUSPID ANNULAR PLANE SYSTOLIC EXCURSION: 2.75 CM
TROPONIN I SERPL DL<=0.01 NG/ML-MCNC: 0.01 NG/ML (ref 0–0.03)
TV REST PULMONARY ARTERY PRESSURE: 32 MMHG
WBC # BLD AUTO: 8.06 K/UL (ref 3.9–12.7)
WBC # BLD AUTO: 9.04 K/UL (ref 3.9–12.7)

## 2019-10-30 PROCEDURE — 80053 COMPREHEN METABOLIC PANEL: CPT

## 2019-10-30 PROCEDURE — 84484 ASSAY OF TROPONIN QUANT: CPT

## 2019-10-30 PROCEDURE — 94761 N-INVAS EAR/PLS OXIMETRY MLT: CPT

## 2019-10-30 PROCEDURE — 85730 THROMBOPLASTIN TIME PARTIAL: CPT | Mod: 91

## 2019-10-30 PROCEDURE — 99233 SBSQ HOSP IP/OBS HIGH 50: CPT | Mod: 25,,, | Performed by: INTERNAL MEDICINE

## 2019-10-30 PROCEDURE — 85025 COMPLETE CBC W/AUTO DIFF WBC: CPT

## 2019-10-30 PROCEDURE — 25000003 PHARM REV CODE 250: Performed by: INTERNAL MEDICINE

## 2019-10-30 PROCEDURE — 36415 COLL VENOUS BLD VENIPUNCTURE: CPT

## 2019-10-30 PROCEDURE — 63600175 PHARM REV CODE 636 W HCPCS: Performed by: INTERNAL MEDICINE

## 2019-10-30 PROCEDURE — 99233 PR SUBSEQUENT HOSPITAL CARE,LEVL III: ICD-10-PCS | Mod: 25,,, | Performed by: INTERNAL MEDICINE

## 2019-10-30 PROCEDURE — G0378 HOSPITAL OBSERVATION PER HR: HCPCS

## 2019-10-30 PROCEDURE — 85730 THROMBOPLASTIN TIME PARTIAL: CPT

## 2019-10-30 RX ORDER — ASPIRIN 81 MG/1
81 TABLET ORAL DAILY
Status: DISCONTINUED | OUTPATIENT
Start: 2019-10-30 | End: 2019-10-30 | Stop reason: HOSPADM

## 2019-10-30 RX ORDER — ENOXAPARIN SODIUM 100 MG/ML
40 INJECTION SUBCUTANEOUS EVERY 24 HOURS
Status: DISCONTINUED | OUTPATIENT
Start: 2019-10-30 | End: 2019-10-30 | Stop reason: HOSPADM

## 2019-10-30 RX ORDER — REGADENOSON 0.08 MG/ML
0.4 INJECTION, SOLUTION INTRAVENOUS ONCE
Status: COMPLETED | OUTPATIENT
Start: 2019-10-30 | End: 2019-10-30

## 2019-10-30 RX ADMIN — ASPIRIN 81 MG: 81 TABLET, COATED ORAL at 08:10

## 2019-10-30 RX ADMIN — REGADENOSON 0.4 MG: 0.08 INJECTION, SOLUTION INTRAVENOUS at 12:10

## 2019-10-30 NOTE — PLAN OF CARE
10/30/19 1217   Discharge Assessment   Assessment Type Discharge Planning Assessment   Assessment information obtained from? Other   Prior to hospitilization cognitive status: Unable to Assess   Prior to hospitalization functional status: Independent   Current cognitive status: Unable to Assess   Current Functional Status: Independent   Lives With child(rick), adult   Able to Return to Prior Arrangements yes   Is patient able to care for self after discharge? Yes   Who are your caregiver(s) and their phone number(s)? Samuel miranda @ [phone]   Patient's perception of discharge disposition home or selfcare   Readmission Within the Last 30 Days previous discharge plan unsuccessful   Patient currently receives any other outside agency services? No   Equipment Currently Used at Home none   Do you have any problems affording any of your prescribed medications? No   Is the patient taking medications as prescribed? yes   Does the patient have transportation home? Yes   Transportation Anticipated family or friend will provide   Does the patient receive services at the [facility]? No   Discharge Plan A Home;Home with family   Discharge Plan B Home;Home with family   DME Needed Upon Discharge  none   Patient/Family in Agreement with Plan yes   Readmission Questionnaire   At the time of your discharge, did someone talk to you about what your health problems were? Yes   At the time of discharge, did someone talk to you about what to watch out for regarding worsening of your health problem? Yes   At the time of discharge, did someone talk to you about what to do if you experienced worsening of your health problem? Yes   At the time of discharge, did someone talk to you about which medication to take when you left the hospital and which ones to stop taking? Yes   At the time of discharge, did someone talk to you about when and where to follow up with a doctor after you left the hospital? Yes

## 2019-10-30 NOTE — PLAN OF CARE
10/30/19 1658   Final Note   Assessment Type Final Discharge Note   Anticipated Discharge Disposition Home   Hospital Follow Up  Appt(s) scheduled? No   Discharge plans and expectations educations in teach back method with documentation complete? Yes     SW informed Nurse Marine pt was cleared from case management.

## 2019-10-30 NOTE — ED PROVIDER NOTES
"Encounter Date: 10/29/2019    SCRIBE #1 NOTE: I, Minerva Rodriguez, am scribing for, and in the presence of,  Tarah Holley MD. I have scribed the following portions of the note - Other sections scribed: HPI, ROS.       History     Chief Complaint   Patient presents with    Chest Pain     EMS reports pt was walking to get the mail and had sudden onset of chest pain radiating to the back. pain is resolved at this time. 324 of aspirin given by EMS     CC: Chest Pain    HPI: This 64 y.o female, with a medical history of asthma, bronchitis, and hypertension, presents to the ED via EMS transportation accompanied by relatives c/o acute mid-sternal chest pain that began PTA. Pt reports that she was returning back into her home from the mailbox when she suddenly began to experience "excruciating" pain to the mid-sternal chest at 3:00 pm today. She states that the pain later began radiating to the back and was accompanied by associated diaphoresis. Pt notes that the episode lasted 45 minutes and resolved upon entering the ambulance. Pt reports that she received 4x aspirin while en route to the ED and denies experiencing any pain currently. She notes that she was sick with a severe productive cough 3x weeks ago. Additionally, pt's  reports that pt was seen in this ED 5-6x months ago for stroke like symptoms, noting that she was informed that the symptoms were either due to her lisinopril or a possible mild stroke. Pt notes that she has since discontinued use of the medication. Pt denies fever, nausea, emesis, shortness of breath, palpitations and dizziness. No history of known heart attacks or strokes. No other associated symptoms.     The history is provided by the patient. No  was used.     Review of patient's allergies indicates:   Allergen Reactions    Lisinopril Other (See Comments)     angioedema     Past Medical History:   Diagnosis Date    Asthma     Bronchitis     Hypertension  "     Past Surgical History:   Procedure Laterality Date    ANKLE SURGERY       SECTION      CHOLECYSTECTOMY       Family History   Problem Relation Age of Onset    Hypertension Mother     Stroke Mother      Social History     Tobacco Use    Smoking status: Never Smoker    Smokeless tobacco: Never Used   Substance Use Topics    Alcohol use: No    Drug use: No     Review of Systems   Constitutional: Positive for diaphoresis. Negative for fever.   HENT: Negative for sore throat.    Eyes: Negative for visual disturbance.   Respiratory: Negative for shortness of breath.    Cardiovascular: Positive for chest pain (mid-sternal; radiating to the back; presently resolved). Negative for palpitations.   Gastrointestinal: Negative for nausea and vomiting.   Genitourinary: Negative for dysuria.   Musculoskeletal: Positive for back pain.   Skin: Negative for rash.   Neurological: Negative for dizziness and weakness.       Physical Exam     Initial Vitals [10/29/19 1650]   BP Pulse Resp Temp SpO2   139/65 80 16 97.8 °F (36.6 °C) 97 %      MAP       --         Physical Exam    Constitutional: She appears well-developed and well-nourished. No distress.   Obese   HENT:   Head: Normocephalic and atraumatic.   Eyes: Conjunctivae and EOM are normal.   Neck: Normal range of motion. Neck supple.   Cardiovascular: Normal rate, regular rhythm, normal heart sounds and intact distal pulses.   Pulmonary/Chest: Breath sounds normal. No respiratory distress.   Abdominal: Soft. She exhibits no distension. There is no tenderness.   Musculoskeletal: Normal range of motion. She exhibits no edema or tenderness.   Neurological: She is alert and oriented to person, place, and time.   Skin: Skin is warm and dry.   Psychiatric: She has a normal mood and affect.         ED Course   Procedures  Labs Reviewed   CBC W/ AUTO DIFFERENTIAL - Abnormal; Notable for the following components:       Result Value    Mean Corpuscular Hemoglobin Conc  31.9 (*)     All other components within normal limits   COMPREHENSIVE METABOLIC PANEL - Abnormal; Notable for the following components:    Potassium 3.0 (*)     CO2 32 (*)     Albumin 3.3 (*)     AST 57 (*)     All other components within normal limits   TROPONIN I - Abnormal; Notable for the following components:    Troponin I 0.027 (*)     All other components within normal limits   B-TYPE NATRIURETIC PEPTIDE   B-TYPE NATRIURETIC PEPTIDE   TROPONIN I          Imaging Results          X-Ray Chest PA And Lateral (Final result)  Result time 10/29/19 17:46:53    Final result by Omid Arora MD (10/29/19 17:46:53)                 Impression:      1. No acute cardiopulmonary process.      Electronically signed by: Omid Arora MD  Date:    10/29/2019  Time:    17:46             Narrative:    EXAMINATION:  XR CHEST PA AND LATERAL    CLINICAL HISTORY:  Chest Pain;    TECHNIQUE:  PA and lateral views of the chest were performed.    COMPARISON:  04/06/2019    FINDINGS:  The cardiomediastinal silhouette is not enlarged, noting calcification of the aortic arch.  There is no pleural effusion.  The trachea is midline.  The lungs are symmetrically expanded bilaterally with mildly coarse interstitial attenuation, accentuated by habitus..  No large focal consolidation seen.  There is no pneumothorax.  The osseous structures are remarkable for degenerative changes..                                 64-year-old female with a history of hypertension and obesity presents to the ED with crushing substernal chest pain associated with diaphoresis. Patient story is concerning for an ACS type event. My differential also includes heart failure, esophagitis, gastritis, and other less likely pulmonary etiology. Patient's  Initial EKG is not consistent with STEMI type pattern. First troponin is negative. Otherwise  Labs were reassuring. Pain did get better with nitroglycerin sublingual. Patient was admitted for continued ACS rule  out.                              Clinical Impression:       ICD-10-CM ICD-9-CM   1. Chest pain R07.9 786.50            Scribe attestation: I, Tarah Holley, personally performed the services described in this documentation. All medical record entries made by the scribe were at my direction and in my presence.  I have reviewed the chart and agree that the record reflects my personal performance and is accurate and complete.                      Tarah Holley MD  11/01/19 8068

## 2019-10-30 NOTE — HPI
64 y.o. female that (in part)  has a past medical history of Asthma, Bronchitis, and Hypertension.  has a past surgical history that includes Cholecystectomy; Ankle surgery; and  section. Presents to [facility] Emergency Department complaining of chest pain. She had sudden onset of acute chest pain rate her back when she was walking to get her mail this morning.  Pain resolved spontaneously.  She was given 325 mg of aspirin by EMS.  She states the pain was midsternal and excruciating.  Lasted approximately 45 min.  She stop taking lisinopril due to possible cough symptoms.  Otherwise reports compliance with home medication regimen.  No history of coronary artery disease or previous MI.  In the emergency department routine laboratory studies, chest x-ray, EKG, cardiac enzymes were obtained.  Patient had minimally elevated troponin level.  She was being admitted for possible non ST elevation MI.

## 2019-10-30 NOTE — PROGRESS NOTES
[facility] CASE MANAGEMENT                  WRITTEN DISCHARGE INFORMATION      APPOINTMENTS AND RESOURCES TO HELP YOU MANAGE YOUR CARE AT HOME BASED ON YOUR PREFERENCES:  (If an appointment is not scheduled for you when you leave the hospital, call your doctor to schedule a follow up visit within a week)    Follow-up Information     Arvin Robbins MD.    Specialties:  Cardiology, INTERVENTIONAL CARDIOLOGY  Contact information:  [address] [facility] [address]  [address]  [place] [address]  [phone]             Call Joao Wilcox Jr, MD.    Specialty:  Family Medicine  Why:  Please call to schedule your PCP appt in 1 week   Contact information:  [address]  [address]  [facility] [address]  [phone]                   Healthy Living Instructions to HELP MANAGE YOUR CARE AT HOME:  Things You are responsible for:  1.    Getting your prescriptions filled   2.    Taking your medications as directed, DO NOT MISS ANY DOSES!  3.    Following the diet and exercise recommended by your doctor  4.    Going to your follow-up doctor appointment. This is important because it allows the doctor to monitor your progress and determine if any changes need to made to your treatment plan.  5. If you have any questions about MANAGING YOUR CARE AT HOME Call the Nurse Care Line for 24/7 Assistance [phone]       Please answer any calls you may receive from [facility]. We want to continue to support you as you manage your healthcare needs. [facility] is happy to have the opportunity to serve you.      Thank you for choosing [facility] for your healthcare needs!  Your [facility] Case Management Team,

## 2019-10-30 NOTE — NURSING
Bruising to left upper arm IV since arriving on the floor. Also bruise/knot/scrape measuring 3 inches wide on her left shoulder. IV site rotated to her left AC. No reported pain from her left shoulder.

## 2019-10-30 NOTE — HPI
Santa Plascencia is a 64 y.o. female that (in part)  has a past medical history of Asthma, Bronchitis, and Hypertension.  has a past surgical history that includes Cholecystectomy; Ankle surgery; and  section. Presents to [facility] Emergency Department complaining of chest pain. She had sudden onset of acute chest pain rate her back when she was walking to get her mail this morning.  Pain resolved spontaneously.  She was given 325 mg of aspirin by EMS.  She states the pain was midsternal and excruciating.  Lasted approximately 45 min.  She stop taking lisinopril due to possible cough symptoms.  Otherwise reports compliance with home medication regimen.  No history of coronary artery disease or previous MI.    In the emergency department routine laboratory studies, chest x-ray, EKG, cardiac enzymes were obtained.  Patient had minimally elevated troponin level.  She was being admitted for possible non ST elevation MI.    Hospital medicine has been asked to admit for further evaluation and treatment.

## 2019-10-30 NOTE — NURSING
Stress test explained to patient including risks and benefits. Pt very apprehensive about procedure and requests to talk to . Pt given phone and calls family. Daughter picks up and she requests for me to talk to daughter. Stress test procedure explained to daughter. Pt agrees to proceed with stress test with lexiscan. Pt refuses treadmill because she states she feels too tired and sluggish to walk on treadmill.

## 2019-10-30 NOTE — CONSULTS
[facility]  Cardiology  Consult Note    Patient Name: Santa Plascencia  MRN: [id number]  Admission Date: 10/29/2019  Hospital Length of Stay: 1 days  Code Status: Full Code   Attending Provider: Chelsea Dillon MD   Consulting Provider: Arvin Robbins MD  Primary Care Physician: Joao Wilcox Jr, MD  Principal Problem:Precordial pain    Patient information was obtained from patient and ER records.     Inpatient consult to Cardiology  Consult performed by: Arvin Robbins MD  Consult ordered by: Chelsea Dillon MD  Reason for consult: CP, trop        Subjective:     Chief Complaint:  CP     HPI:   64 y.o. female that (in part)  has a past medical history of Asthma, Bronchitis, and Hypertension.  has a past surgical history that includes Cholecystectomy; Ankle surgery; and  section. Presents to [facility] Emergency Department complaining of chest pain. She had sudden onset of acute chest pain rate her back when she was walking to get her mail this morning.  Pain resolved spontaneously.  She was given 325 mg of aspirin by EMS.  She states the pain was midsternal and excruciating.  Lasted approximately 45 min.  She stop taking lisinopril due to possible cough symptoms.  Otherwise reports compliance with home medication regimen.  No history of coronary artery disease or previous MI.  In the emergency department routine laboratory studies, chest x-ray, EKG, cardiac enzymes were obtained.  Patient had minimally elevated troponin level.  She was being admitted for possible non ST elevation MI.    Past Medical History:   Diagnosis Date    Asthma     Bronchitis     Hypertension        Past Surgical History:   Procedure Laterality Date    ANKLE SURGERY       SECTION      CHOLECYSTECTOMY         Review of patient's allergies indicates:   Allergen Reactions    Lisinopril Other (See Comments)     angioedema       No current facility-administered  medications on file prior to encounter.      Current Outpatient Medications on File Prior to Encounter   Medication Sig    albuterol (ACCUNEB) 0.63 mg/3 mL Nebu Take 0.63 mg by nebulization every 6 (six) hours as needed.    aspirin (ECOTRIN) 81 MG EC tablet Take 1 tablet (81 mg total) by mouth once daily.    atorvastatin (LIPITOR) 20 MG tablet Take 1 tablet (20 mg total) by mouth once daily.    cetirizine (ZYRTEC) 10 MG tablet ONE TABLET BY MOUTH AT BEDTIME    cyclobenzaprine (FEXMID) 7.5 MG Tab Take 10 mg by mouth 3 (three) times daily as needed.    ferrous sulfate 324 mg (65 mg iron) TbEC ONE TABLET BY MOUTH once a day    FLUTICASONE/VILANTEROL (BREO ELLIPTA INHL) Inhale into the lungs.    hydroCHLOROthiazide (HYDRODIURIL) 25 MG tablet Take 1 tablet (25 mg total) by mouth once daily.    colchicine (COLCRYS) 0.6 mg tablet Take 0.6 mg by mouth once daily.    cyanocobalamin 1,000 mcg/mL injection     ferrous gluconate (FERGON) 324 MG tablet Take 324 mg by mouth daily with breakfast.    fluticasone propionate (FLONASE) 50 mcg/actuation nasal spray 2 sprays once daily. In each nostril     Family History     Problem Relation (Age of Onset)    Hypertension Mother    Stroke Mother        Tobacco Use    Smoking status: Never Smoker    Smokeless tobacco: Never Used   Substance and Sexual Activity    Alcohol use: No    Drug use: No    Sexual activity: Not on file     Review of Systems   Constitution: Negative for chills, diaphoresis, fever and malaise/fatigue.   HENT: Negative for nosebleeds.    Eyes: Negative for blurred vision and double vision.   Cardiovascular: Positive for chest pain. Negative for claudication, cyanosis, dyspnea on exertion, leg swelling, orthopnea, palpitations, paroxysmal nocturnal dyspnea and syncope.   Respiratory: Negative for cough, shortness of breath and wheezing.    Skin: Negative for dry skin and poor wound healing.   Musculoskeletal: Negative for back pain, joint swelling and  myalgias.   Gastrointestinal: Negative for abdominal pain, nausea and vomiting.   Genitourinary: Negative for hematuria.   Neurological: Negative for dizziness, headaches, numbness, seizures and weakness.   Psychiatric/Behavioral: Negative for altered mental status and depression.     Objective:     Vital Signs (Most Recent):  Temp: 97.9 °F (36.6 °C) (10/30/19 0516)  Pulse: (!) 58 (10/30/19 0516)  Resp: 18 (10/30/19 0516)  BP: (!) 156/70 (10/30/19 0551)  SpO2: 98 % (10/30/19 0516) Vital Signs (24h Range):  Temp:  [97.7 °F (36.5 °C)-97.9 °F (36.6 °C)] 97.9 °F (36.6 °C)  Pulse:  [58-90] 58  Resp:  [16-32] 18  SpO2:  [96 %-100 %] 98 %  BP: (139-179)/(63-74) 156/70     Weight: (!) 139.1 kg (306 lb 10.6 oz)  Body mass index is 48.03 kg/m².    SpO2: 98 %  O2 Device (Oxygen Therapy): room air    No intake or output data in the 24 hours ending 10/30/19 0706    Lines/Drains/Airways     Peripheral Intravenous Line                 Peripheral IV - Single Lumen 10/29/19 18 G Left Antecubital 1 day         Peripheral IV - Single Lumen 10/29/19 2040 20 G Right Upper Arm less than 1 day                Physical Exam   Constitutional: She is oriented to person, place, and time. She appears well-developed and well-nourished. No distress.   HENT:   Head: Normocephalic and atraumatic.   Mouth/Throat: No oropharyngeal exudate.   Eyes: Pupils are equal, round, and reactive to light. Conjunctivae and EOM are normal. No scleral icterus.   Neck: Normal range of motion. Neck supple. No JVD present. No tracheal deviation present. No thyromegaly present.   Cardiovascular: Normal rate, regular rhythm, S1 normal and S2 normal. Exam reveals no gallop and no friction rub.   No murmur heard.  Pulmonary/Chest: Effort normal and breath sounds normal. No respiratory distress. She has no wheezes. She has no rales. She exhibits no tenderness.   Abdominal: Soft. She exhibits no distension.   obese   Musculoskeletal: Normal range of motion. She exhibits no  edema.   Neurological: She is alert and oriented to person, place, and time. No cranial nerve deficit.   Skin: Skin is warm and dry. She is not diaphoretic.   Psychiatric: She has a normal mood and affect. Her behavior is normal. Judgment normal.       Current Medications:     heparin (porcine) in D5W 12 Units/kg/hr (10/29/19 2054)     heparin (PORCINE), heparin (PORCINE), sodium chloride 0.9%    Laboratory (all labs reviewed):  CBC:  Recent Labs   Lab 11/09/16 1820 09/09/17 1715 04/06/19  1544 10/29/19  1747 10/30/19  0149   WBC 17.95 H 10.05 10.31 8.03 9.04   Hemoglobin 11.3 L 12.9 11.5 L 12.3 11.9 L   Hematocrit 34.3 L 39.8 36.2 L 38.6 36.8 L   Platelets 218 256 262 230 220       CHEMISTRIES:  Recent Labs   Lab 09/09/17 1715 04/06/19  1544 04/07/19  0424 07/11/19  1251 10/29/19  1747 10/30/19  0149   Glucose 105 90 91  --  97 100   Sodium 141 141 141  --  142 142   Potassium 4.2 3.7 3.7  --  3.0 L 3.4 L   BUN, Bld 17 16 14  --  17 12   Creatinine 1.1 0.9 0.9 0.9 1.0 0.9   eGFR if  >60 >60 >60 >60 >60 >60   eGFR if non  54 A >60 >60 >60 >60 >60   Calcium 9.2 9.5 8.9  --  8.7 8.7       CARDIAC BIOMARKERS:  Recent Labs   Lab 11/09/16  1820 10/29/19  1747 10/29/19  2045 10/30/19  0149   Troponin I 0.008 0.027 H 0.014 0.014       COAGS:  Recent Labs   Lab 11/09/16 1820 04/06/19  1544 10/29/19  1747   INR 1.1 1.1 1.1       LIPIDS/LFTS:  Recent Labs   Lab 11/09/16 1820 09/09/17  1715 04/06/19  1544 10/29/19  1747 10/30/19  0149   Cholesterol  --   --  205 H  --   --    Triglycerides  --   --  102  --   --    HDL  --   --  57  --   --    LDL Cholesterol  --   --  127.6  --   --    Non-HDL Cholesterol  --   --  148  --   --    AST 15 14 15 57 H 105 H   ALT 11 12 11 26 67 H       BNP:  Recent Labs   Lab 11/09/16  1820 10/29/19  1747   BNP 54 45  45       TSH:  Recent Labs   Lab 11/09/16  1820 04/06/19  1544   TSH 0.964 1.343       Free T4:        Diagnostic Results:  ECG (personally  reviewed and interpreted tracing(s)):  10/29/19 1730 SR 87    Chest X-Ray (personally reviewed and interpreted image(s)): 10/29/19 NAD    Echo: 4/7/19 (repeat ordered)  · Normal left ventricular systolic function. The estimated ejection fraction is 55%  · Concentric left ventricular remodeling.    Ex MPI: ordered    Renal art US 5/6/19  Normal appearance of the bilateral kidneys.  Slightly elevated segmental resistive indices can be associated with  chronic medical kidney disease.  Normal bilateral renal artery waveform configuration.    Carotid US 4/6/19  1.  No evidence of hemodynamically significant stenosis in the right carotid system.  2.  Peak systolic velocity of 153 cm/sec in the left distal ICA, corresponding to approximately 50-69% luminal narrowing of the left distal ICA.        Assessment and Plan:     * Precordial pain  Somewhat atyp CP.  Minimal trop (0.02, then neg x2).  EKG without overt ischemic changes.  Doubt ACS.  Heparin gtt stopped.  Several RF noted.  Echo and MPI for assessment today.    Class 3 severe obesity due to excess calories in adult  Diet/exercise/weight loss      Hyperlipidemia  Cont statin    HTN (hypertension)  Cont med rx        VTE Risk Mitigation (From admission, onward)         Ordered     enoxaparin injection 40 mg  Daily      10/30/19 0709     IP VTE HIGH RISK PATIENT  Once      10/29/19 2144     Place ROME hose  Until discontinued      10/29/19 2144                Thank you for your consult. I will follow-up with patient. Please contact us if you have any additional questions.    Arvin Robbins MD  Cardiology   [facility]      Addendum 240pm:    Evon MPI 10/30/19 (images personally reviewed and interpreted)    Probably normal Evon myocardial perfusion study.    The perfusion scan is free of evidence from myocardial ischemia or injury.    There is a trivial to mild intensity defect in the apical wall of the left ventricle, secondary to breast  attenuation.    Gated perfusion images showed an ejection fraction of  64 % post stress.    There is normal wall motion post stress.    Echo 10/30/19 (images personally reviewed and interpreted)  · Normal left ventricular systolic function. The estimated ejection fraction is 60%  · No wall motion abnormalities.  · Normal right ventricular systolic function.  · Mild tricuspid regurgitation.  · The estimated PA systolic pressure is 32 mm Hg    Dispo planning appropriate.  Pt can follow up with me in the office.

## 2019-10-30 NOTE — ASSESSMENT & PLAN NOTE
Somewhat atyp CP.  Minimal trop (0.02, then neg x2).  EKG without overt ischemic changes.  Doubt ACS.  Heparin gtt stopped.  Several RF noted.  Echo and MPI for assessment today.

## 2019-10-30 NOTE — H&P
[facility] Hospital Medicine  History & Physical    Patient Name: Santa Plascencia  MRN: [id number]  Admission Date: 10/29/2019  Attending Physician: Arvin Louie MD, MPH      PCP:     Joao Wilcox Jr, MD    CC:     Chief Complaint   Patient presents with    Chest Pain     EMS reports pt was walking to get the mail and had sudden onset of chest pain radiating to the back. pain is resolved at this time. 324 of aspirin given by EMS       HISTORY OF PRESENT ILLNESS:     Santa Plascencia is a 64 y.o. female that (in part)  has a past medical history of Asthma, Bronchitis, and Hypertension.  has a past surgical history that includes Cholecystectomy; Ankle surgery; and  section. Presents to [facility] Emergency Department complaining of chest pain. She had sudden onset of acute chest pain rate her back when she was walking to get her mail this morning.  Pain resolved spontaneously.  She was given 325 mg of aspirin by EMS.  She states the pain was midsternal and excruciating.  Lasted approximately 45 min.  She stop taking lisinopril due to possible cough symptoms.  Otherwise reports compliance with home medication regimen.  No history of coronary artery disease or previous MI.    In the emergency department routine laboratory studies, chest x-ray, EKG, cardiac enzymes were obtained.  Patient had minimally elevated troponin level.  She was being admitted for possible non ST elevation MI.    Hospital medicine has been asked to admit for further evaluation and treatment.       REVIEW OF SYSTEMS:     -- Constitutional: No fever or chills.  -- Eyes: No visual changes, diplopia, pain, tearing, blind spots, or discharge.   -- Ears, nose, mouth, throat, and face: No congestion, sore throat, epistaxis, d/c, bleeding gums, neck stiffness masses, or dental issues.  -- Respiratory: No cough, shortness of breath, hemoptysis, stridor, wheezing, or night sweats.  --  Cardiovascular:  as above in HPI.   -- Gastrointestinal: No vomiting, abdominal pain, hematemesis, melena, dyspepsia, or change in bowel habits.  -- Genitourinary: No hematuria, dysuria, frequency, urgency, nocturia, polyuria, stones, or incontinence.  -- Integument/breast: No rash, pruritis, pigmentation changes, dryness, or changes in hair  -- Hematologic/lymphatic: No easy bruising or lymphadenopathy.   -- Musculoskeletal: No acute arthralgias, acute myalgias, joint swelling, acute limitations of ROM, or acute muscular weakness.  -- Neurological: No seizures, headaches, incoordination, paraesthesias, ataxia, vertigo, or tremors.  -- Behavioral/Psych: No auditory or visual hallucinations, depression, or suicidal/homicidal ideations.  -- Endocrine: No heat or cold intolerance, polydipsia, or unintentional weight gain / loss.  -- Allergy/Immunologic: No recurrent infections or adverse reaction to food, insects, or difficulty breathing.        PAST MEDICAL / SURGICAL HISTORY:     Past Medical History:   Diagnosis Date    Asthma     Bronchitis     Hypertension      Past Surgical History:   Procedure Laterality Date    ANKLE SURGERY       SECTION      CHOLECYSTECTOMY           FAMILY HISTORY:     Family History   Problem Relation Age of Onset    Hypertension Mother     Stroke Mother          SOCIAL HISTORY:     Social History     Socioeconomic History    Marital status:      Spouse name: Not on file    Number of children: Not on file    Years of education: Not on file    Highest education level: Not on file   Occupational History    Not on file   Social Needs    Financial resource strain: Not on file    Food insecurity:     Worry: Not on file     Inability: Not on file    Transportation needs:     Medical: Not on file     Non-medical: Not on file   Tobacco Use    Smoking status: Never Smoker    Smokeless tobacco: Never Used   Substance and Sexual Activity    Alcohol use: No    Drug  use: No    Sexual activity: Not on file   Lifestyle    Physical activity:     Days per week: Not on file     Minutes per session: Not on file    Stress: Not on file   Relationships    Social connections:     Talks on phone: Not on file     Gets together: Not on file     Attends Spiritism service: Not on file     Active member of club or organization: Not on file     Attends meetings of clubs or organizations: Not on file     Relationship status: Not on file   Other Topics Concern    Not on file   Social History Narrative    Not on file         ALLERGIES:       Review of patient's allergies indicates:   Allergen Reactions    Lisinopril Other (See Comments)     angioedema         HOME MEDICATIONS:     Prior to Admission medications    Medication Sig Start Date End Date Taking? Authorizing Provider   albuterol (ACCUNEB) 0.63 mg/3 mL Nebu Take 0.63 mg by nebulization every 6 (six) hours as needed.   Yes Historical Provider, MD   aspirin (ECOTRIN) 81 MG EC tablet Take 1 tablet (81 mg total) by mouth once daily. 4/7/19 4/6/20 Yes Sabrina Najera NP   atorvastatin (LIPITOR) 20 MG tablet Take 1 tablet (20 mg total) by mouth once daily. 4/8/19 4/7/20 Yes Sabrina Najera NP   cetirizine (ZYRTEC) 10 MG tablet ONE TABLET BY MOUTH AT BEDTIME 5/2/19  Yes Historical Provider, MD   cyclobenzaprine (FEXMID) 7.5 MG Tab Take 10 mg by mouth 3 (three) times daily as needed.   Yes Historical Provider, MD   ferrous sulfate 324 mg (65 mg iron) TbEC ONE TABLET BY MOUTH once a day 5/2/19  Yes Historical Provider, MD   FLUTICASONE/VILANTEROL (BREO ELLIPTA INHL) Inhale into the lungs.   Yes Historical Provider, MD   hydroCHLOROthiazide (HYDRODIURIL) 25 MG tablet Take 1 tablet (25 mg total) by mouth once daily. 4/7/19 4/6/20 Yes Sabrina Najera NP   colchicine (COLCRYS) 0.6 mg tablet Take 0.6 mg by mouth once daily.    Historical Provider, MD   cyanocobalamin 1,000 mcg/mL injection  3/25/19   Historical Provider, MD   ferrous  gluconate (FERGON) 324 MG tablet Take 324 mg by mouth daily with breakfast.    Historical Provider, MD   fluticasone propionate (FLONASE) 50 mcg/actuation nasal spray 2 sprays once daily. In each nostril 5/2/19   Historical Provider, MD   lisinopril-hydrochlorothiazide (PRINZIDE,ZESTORETIC) 20-25 mg Tab ONE TABLET BY MOUTH once a day 5/2/19 10/29/19  Historical Provider, MD          HOSPITAL MEDICATIONS:     Scheduled Meds:    aspirin  325 mg Oral ED 1 Time     Continuous Infusions:    heparin (porcine) in D5W 12 Units/kg/hr (10/29/19 2054)     PRN Meds: heparin (PORCINE), heparin (PORCINE), sodium chloride 0.9%      PHYSICAL EXAM:     Wt Readings from Last 1 Encounters:   10/29/19 2151 (!) 139 kg (306 lb 7 oz)   10/29/19 1650 136.1 kg (300 lb)     Body mass index is 48 kg/m².  Vitals:    10/29/19 1825 10/29/19 1903 10/29/19 2128 10/29/19 2151   BP: (!) 178/73 (!) 154/67 (!) 161/70 (!) 179/70   BP Location:   Right arm Right arm   Patient Position:   Sitting Lying   Pulse: 82 78 76 76   Resp: (!) 24 (!) 32 (!) 24 20   Temp:   97.7 °F (36.5 °C) 97.7 °F (36.5 °C)   TempSrc:   Oral Oral   SpO2: 99% 99% 100% 98%   Weight:    (!) 139 kg (306 lb 7 oz)   Height:              -- General appearance: well developed. appears stated age   -- Head: normocephalic, atraumatic   -- Eyes: conjunctivae clear. Extraocular muscles intact  -- Nose: Nares normal. Septum midline.   -- Mouth/Throat: lips, mucosa, and tongue normal. no throat erythema.   -- Neck: supple, symmetrical, trachea midline, no JVD and thyroid not grossly enlarged, appears symmetric  -- Lungs: clear to auscultation bilaterally. normal respiratory effort. No use of accessory muscles.   -- Chest wall: no tenderness. equal bilateral chest rise   -- Heart: regular rate and regular rhythm. S1, S2 normal.  no click, rub or gallop   -- Abdomen: soft, non-tender, non-distended, non-tympanic; bowel sounds normal; no masses  -- Extremities: no cyanosis, clubbing or edema.    -- Pulses: 2+ and symmetric   -- Skin:  Turgor normal. Color normal. Texture normal. No rashes or lesions.   -- Neurologic: Normal strength and tone. No focal numbness or weakness. CNII-XII intact. [place] coma scale: eyes open spontaneously-4, oriented & converses-5, obeys commands-6.      LABORATORY STUDIES:     Recent Results (from the past 36 hour(s))   CBC auto differential    Collection Time: 10/29/19  5:47 PM   Result Value Ref Range    WBC 8.03 3.90 - 12.70 K/uL    RBC 4.01 4.00 - 5.40 M/uL    Hemoglobin 12.3 12.0 - 16.0 g/dL    Hematocrit 38.6 37.0 - 48.5 %    Mean Corpuscular Volume 96 82 - 98 fL    Mean Corpuscular Hemoglobin 30.7 27.0 - 31.0 pg    Mean Corpuscular Hemoglobin Conc 31.9 (L) 32.0 - 36.0 g/dL    RDW 13.0 11.5 - 14.5 %    Platelets 230 150 - 350 K/uL    MPV 11.6 9.2 - 12.9 fL    Immature Granulocytes 0.2 0.0 - 0.5 %    Gran # (ANC) 5.8 1.8 - 7.7 K/uL    Immature Grans (Abs) 0.02 0.00 - 0.04 K/uL    Lymph # 1.7 1.0 - 4.8 K/uL    Mono # 0.4 0.3 - 1.0 K/uL    Eos # 0.1 0.0 - 0.5 K/uL    Baso # 0.03 0.00 - 0.20 K/uL    nRBC 0 0 /100 WBC    Gran% 72.4 38.0 - 73.0 %    Lymph% 20.5 18.0 - 48.0 %    Mono% 5.5 4.0 - 15.0 %    Eosinophil% 1.0 0.0 - 8.0 %    Basophil% 0.4 0.0 - 1.9 %    Differential Method Automated    Comprehensive metabolic panel    Collection Time: 10/29/19  5:47 PM   Result Value Ref Range    Sodium 142 136 - 145 mmol/L    Potassium 3.0 (L) 3.5 - 5.1 mmol/L    Chloride 102 95 - 110 mmol/L    CO2 32 (H) 23 - 29 mmol/L    Glucose 97 70 - 110 mg/dL    BUN, Bld 17 8 - 23 mg/dL    Creatinine 1.0 0.5 - 1.4 mg/dL    Calcium 8.7 8.7 - 10.5 mg/dL    Total Protein 7.3 6.0 - 8.4 g/dL    Albumin 3.3 (L) 3.5 - 5.2 g/dL    Total Bilirubin 1.0 0.1 - 1.0 mg/dL    Alkaline Phosphatase 116 55 - 135 U/L    AST 57 (H) 10 - 40 U/L    ALT 26 10 - 44 U/L    Anion Gap 8 8 - 16 mmol/L    eGFR if African American >60 >60 mL/min/1.73 m^2    eGFR if non African American >60 >60 mL/min/1.73 m^2   Troponin I  #1    Collection Time: 10/29/19  5:47 PM   Result Value Ref Range    Troponin I 0.027 (H) 0.000 - 0.026 ng/mL   B-Type natriuretic peptide (BNP)    Collection Time: 10/29/19  5:47 PM   Result Value Ref Range    BNP 45 0 - 99 pg/mL   Brain natriuretic peptide    Collection Time: 10/29/19  5:47 PM   Result Value Ref Range    BNP 45 0 - 99 pg/mL   APTT    Collection Time: 10/29/19  5:47 PM   Result Value Ref Range    aPTT 32.1 (H) 21.0 - 32.0 sec   Protime-INR    Collection Time: 10/29/19  5:47 PM   Result Value Ref Range    Prothrombin Time 11.2 9.0 - 12.5 sec    INR 1.1 0.8 - 1.2   Troponin I #2    Collection Time: 10/29/19  8:45 PM   Result Value Ref Range    Troponin I 0.014 0.000 - 0.026 ng/mL       Lab Results   Component Value Date    INR 1.1 10/29/2019    INR 1.1 04/06/2019    INR 1.1 11/09/2016     Lab Results   Component Value Date    HGBA1C 5.1 04/06/2019     No results for input(s): POCTGLUCOSE in the last 72 hours.        IMAGING:     Imaging Results          X-Ray Chest PA And Lateral (Final result)  Result time 10/29/19 17:46:53    Final result by Omid Arora MD (10/29/19 17:46:53)                 Impression:      1. No acute cardiopulmonary process.      Electronically signed by: Omid Arora MD  Date:    10/29/2019  Time:    17:46             Narrative:    EXAMINATION:  XR CHEST PA AND LATERAL    CLINICAL HISTORY:  Chest Pain;    TECHNIQUE:  PA and lateral views of the chest were performed.    COMPARISON:  04/06/2019    FINDINGS:  The cardiomediastinal silhouette is not enlarged, noting calcification of the aortic arch.  There is no pleural effusion.  The trachea is midline.  The lungs are symmetrically expanded bilaterally with mildly coarse interstitial attenuation, accentuated by habitus..  No large focal consolidation seen.  There is no pneumothorax.  The osseous structures are remarkable for degenerative changes..                                    ASSESSMENT & PLAN:     Primary  Diagnosis:  NSTEMI (non-ST elevated myocardial infarction)    Active Hospital Problems    Diagnosis  POA    *NSTEMI (non-ST elevated myocardial infarction) [I21.4]  Yes     Priority: 1 - High    Hypokalemia [E87.6]  Yes    Hyperlipidemia [E78.5]  Yes    Class 3 severe obesity due to excess calories in adult [E66.01]  Yes    HTN (hypertension) [I10]  Yes      Resolved Hospital Problems   No resolved problems to display.       Elevated troponin; treated as NSTEMI initially   · No evidence of acute ST elevation MI on EKG  · Given Plavix and started on heparin infusion  · Second troponin decreased.   · Monitoring on telemetry  · Elevated troponin possibly due to cardiac strain  · Aspirin   · Supplemental oxygen  · Trend troponins  · Will consult Cardiology for further evaluation and recommendations    Hypokalemia  · Due to GI losses or poor oral intake  · Check magnesium  · Replace potassium orally if possible, if not then IV  · Monitor with serial labs    Hyperlipidemia  · Lipid panel - as an outpatient  · Cardiac diet  · Continue statin    Morbid obesity  · Body mass index is 48 kg/m².  · Order a cardiac diet  · Counseling given  · Nutrition consult as an outpatient    Hypertension  · Goal while inpatient is a systolic blood pressure less than 160mmHg  · BP in acceptable range at this time  · Continue current home regimen with hold parameters  · PRN antihypertensives available          VTE Risk Mitigation (From admission, onward)         Ordered     IP VTE HIGH RISK PATIENT  Once      10/29/19 2144     Place ROME hose  Until discontinued      10/29/19 2144     heparin 25,000 units in dextrose 5% 250 mL (100 units/mL) infusion LOW INTENSITY nomogram - [facility]  Continuous      10/29/19 1903     heparin 25,000 units in dextrose 5% (100 units/ml) IV bolus from bag - ADDITIONAL PRN BOLUS - 60 units/kg (max bolus 4000 units)  As needed (PRN)      10/29/19 1903     heparin 25,000 units in dextrose 5% (100 units/ml) IV bolus  from bag - ADDITIONAL PRN BOLUS - 30 units/kg (max bolus 4000 units)  As needed (PRN)      10/29/19 1903                  Adult PRN medications available   DVT prophylaxis given       DISPOSITION:     Will admit to the Hospital Medicine service for further evaluation and treatment.    Chart reviewed and updated where applicable.    High Risk Conditions:  Patient has a condition that poses threat to life and bodily function:  Possible non ST elevation MI      ===============================================================    Arvin Louie MD, MPH  Department of Hospital Medicine   [facility]  [phone] pg  (7pm - 6am)          This note is dictated using [employer] voice recognition software.  There are word recognition mistakes that are occasionally missed on review.

## 2019-10-30 NOTE — SUBJECTIVE & OBJECTIVE
Past Medical History:   Diagnosis Date    Asthma     Bronchitis     Hypertension        Past Surgical History:   Procedure Laterality Date    ANKLE SURGERY       SECTION      CHOLECYSTECTOMY         Review of patient's allergies indicates:   Allergen Reactions    Lisinopril Other (See Comments)     angioedema       No current facility-administered medications on file prior to encounter.      Current Outpatient Medications on File Prior to Encounter   Medication Sig    albuterol (ACCUNEB) 0.63 mg/3 mL Nebu Take 0.63 mg by nebulization every 6 (six) hours as needed.    aspirin (ECOTRIN) 81 MG EC tablet Take 1 tablet (81 mg total) by mouth once daily.    atorvastatin (LIPITOR) 20 MG tablet Take 1 tablet (20 mg total) by mouth once daily.    cetirizine (ZYRTEC) 10 MG tablet ONE TABLET BY MOUTH AT BEDTIME    cyclobenzaprine (FEXMID) 7.5 MG Tab Take 10 mg by mouth 3 (three) times daily as needed.    ferrous sulfate 324 mg (65 mg iron) TbEC ONE TABLET BY MOUTH once a day    FLUTICASONE/VILANTEROL (BREO ELLIPTA INHL) Inhale into the lungs.    hydroCHLOROthiazide (HYDRODIURIL) 25 MG tablet Take 1 tablet (25 mg total) by mouth once daily.    colchicine (COLCRYS) 0.6 mg tablet Take 0.6 mg by mouth once daily.    cyanocobalamin 1,000 mcg/mL injection     ferrous gluconate (FERGON) 324 MG tablet Take 324 mg by mouth daily with breakfast.    fluticasone propionate (FLONASE) 50 mcg/actuation nasal spray 2 sprays once daily. In each nostril     Family History     Problem Relation (Age of Onset)    Hypertension Mother    Stroke Mother        Tobacco Use    Smoking status: Never Smoker    Smokeless tobacco: Never Used   Substance and Sexual Activity    Alcohol use: No    Drug use: No    Sexual activity: Not on file     Review of Systems   Constitution: Negative for chills, diaphoresis, fever and malaise/fatigue.   HENT: Negative for nosebleeds.    Eyes: Negative for blurred vision and double vision.    Cardiovascular: Positive for chest pain. Negative for claudication, cyanosis, dyspnea on exertion, leg swelling, orthopnea, palpitations, paroxysmal nocturnal dyspnea and syncope.   Respiratory: Negative for cough, shortness of breath and wheezing.    Skin: Negative for dry skin and poor wound healing.   Musculoskeletal: Negative for back pain, joint swelling and myalgias.   Gastrointestinal: Negative for abdominal pain, nausea and vomiting.   Genitourinary: Negative for hematuria.   Neurological: Negative for dizziness, headaches, numbness, seizures and weakness.   Psychiatric/Behavioral: Negative for altered mental status and depression.     Objective:     Vital Signs (Most Recent):  Temp: 97.9 °F (36.6 °C) (10/30/19 0516)  Pulse: (!) 58 (10/30/19 0516)  Resp: 18 (10/30/19 0516)  BP: (!) 156/70 (10/30/19 0551)  SpO2: 98 % (10/30/19 0516) Vital Signs (24h Range):  Temp:  [97.7 °F (36.5 °C)-97.9 °F (36.6 °C)] 97.9 °F (36.6 °C)  Pulse:  [58-90] 58  Resp:  [16-32] 18  SpO2:  [96 %-100 %] 98 %  BP: (139-179)/(63-74) 156/70     Weight: (!) 139.1 kg (306 lb 10.6 oz)  Body mass index is 48.03 kg/m².    SpO2: 98 %  O2 Device (Oxygen Therapy): room air    No intake or output data in the 24 hours ending 10/30/19 0706    Lines/Drains/Airways     Peripheral Intravenous Line                 Peripheral IV - Single Lumen 10/29/19 18 G Left Antecubital 1 day         Peripheral IV - Single Lumen 10/29/19 2040 20 G Right Upper Arm less than 1 day                Physical Exam   Constitutional: She is oriented to person, place, and time. She appears well-developed and well-nourished. No distress.   HENT:   Head: Normocephalic and atraumatic.   Mouth/Throat: No oropharyngeal exudate.   Eyes: Pupils are equal, round, and reactive to light. Conjunctivae and EOM are normal. No scleral icterus.   Neck: Normal range of motion. Neck supple. No JVD present. No tracheal deviation present. No thyromegaly present.   Cardiovascular: Normal  rate, regular rhythm, S1 normal and S2 normal. Exam reveals no gallop and no friction rub.   No murmur heard.  Pulmonary/Chest: Effort normal and breath sounds normal. No respiratory distress. She has no wheezes. She has no rales. She exhibits no tenderness.   Abdominal: Soft. She exhibits no distension.   obese   Musculoskeletal: Normal range of motion. She exhibits no edema.   Neurological: She is alert and oriented to person, place, and time. No cranial nerve deficit.   Skin: Skin is warm and dry. She is not diaphoretic.   Psychiatric: She has a normal mood and affect. Her behavior is normal. Judgment normal.       Current Medications:     heparin (porcine) in D5W 12 Units/kg/hr (10/29/19 2054)     heparin (PORCINE), heparin (PORCINE), sodium chloride 0.9%    Laboratory (all labs reviewed):  CBC:  Recent Labs   Lab 11/09/16  1820 09/09/17  1715 04/06/19  1544 10/29/19  1747 10/30/19  0149   WBC 17.95 H 10.05 10.31 8.03 9.04   Hemoglobin 11.3 L 12.9 11.5 L 12.3 11.9 L   Hematocrit 34.3 L 39.8 36.2 L 38.6 36.8 L   Platelets 218 256 262 230 220       CHEMISTRIES:  Recent Labs   Lab 09/09/17  1715 04/06/19  1544 04/07/19  0424 07/11/19  1251 10/29/19  1747 10/30/19  0149   Glucose 105 90 91  --  97 100   Sodium 141 141 141  --  142 142   Potassium 4.2 3.7 3.7  --  3.0 L 3.4 L   BUN, Bld 17 16 14  --  17 12   Creatinine 1.1 0.9 0.9 0.9 1.0 0.9   eGFR if  >60 >60 >60 >60 >60 >60   eGFR if non  54 A >60 >60 >60 >60 >60   Calcium 9.2 9.5 8.9  --  8.7 8.7       CARDIAC BIOMARKERS:  Recent Labs   Lab 11/09/16  1820 10/29/19  1747 10/29/19  2045 10/30/19  0149   Troponin I 0.008 0.027 H 0.014 0.014       COAGS:  Recent Labs   Lab 11/09/16  1820 04/06/19  1544 10/29/19  1747   INR 1.1 1.1 1.1       LIPIDS/LFTS:  Recent Labs   Lab 11/09/16  1820 09/09/17  1715 04/06/19  1544 10/29/19  1747 10/30/19  0149   Cholesterol  --   --  205 H  --   --    Triglycerides  --   --  102  --   --    HDL  --    --  57  --   --    LDL Cholesterol  --   --  127.6  --   --    Non-HDL Cholesterol  --   --  148  --   --    AST 15 14 15 57 H 105 H   ALT 11 12 11 26 67 H       BNP:  Recent Labs   Lab 11/09/16  1820 10/29/19  1747   BNP 54 45  45       TSH:  Recent Labs   Lab 11/09/16  1820 04/06/19  1544   TSH 0.964 1.343       Free T4:        Diagnostic Results:  ECG (personally reviewed and interpreted tracing(s)):  10/29/19 1730 SR 87    Chest X-Ray (personally reviewed and interpreted image(s)): 10/29/19 NAD    Echo: 4/7/19 (repeat ordered)  · Normal left ventricular systolic function. The estimated ejection fraction is 55%  · Concentric left ventricular remodeling.    Ex MPI: ordered    Renal art US 5/6/19  Normal appearance of the bilateral kidneys.  Slightly elevated segmental resistive indices can be associated with  chronic medical kidney disease.  Normal bilateral renal artery waveform configuration.    Carotid US 4/6/19  1.  No evidence of hemodynamically significant stenosis in the right carotid system.  2.  Peak systolic velocity of 153 cm/sec in the left distal ICA, corresponding to approximately 50-69% luminal narrowing of the left distal ICA.

## 2019-10-30 NOTE — PROGRESS NOTES
Report received from off going nurse, JOSE Chiu.  Patient AAO. No signs of distress noted. Call light in reach. Bed low and locked. Will continue to monitor.

## 2019-11-02 NOTE — HOSPITAL COURSE
Ms. Plascencia presented with chest pain. Patient admitted under observation to rule out cardiac etiology.  Cardiology was consulted.  Initial troponin was 0.027, which later decreased to 0.014.  Patient's chest pain had resolved.  BNP only 45.  Had no physical findings to suggest heart failure.  A transthoracic echocardiogram showed mild tricuspid valve regurgitation but was otherwise normal.  Only mildly anemic and with no signs of infection.  Patient was breathing comfortably at room air.  A nuclear stress test was negative for myocardial injury or ischemia.  Patient was discharged home in stable condition.  Advised weight loss and healthy diet.  PCP follow-up at date shown below.  Activity as tolerated.

## 2019-11-02 NOTE — DISCHARGE SUMMARY
[facility] Hospital Medicine  Discharge Summary      Patient Name: Santa Plascencia  MRN: [id number]  Admission Date: 10/29/2019  Hospital Length of Stay: 1 days  Discharge Date and Time:  10/30/2019 7:14 AM  Attending Physician: Vera att. providers found   Discharging Provider: Chelsea Romo MD  Primary Care Provider: Joao Wilcox Jr, MD      HPI:     Santa Plascencia is a 64 y.o. female that (in part)  has a past medical history of Asthma, Bronchitis, and Hypertension.  has a past surgical history that includes Cholecystectomy; Ankle surgery; and  section. Presents to [facility] Emergency Department complaining of chest pain. She had sudden onset of acute chest pain rate her back when she was walking to get her mail this morning.  Pain resolved spontaneously.  She was given 325 mg of aspirin by EMS.  She states the pain was midsternal and excruciating.  Lasted approximately 45 min.  She stop taking lisinopril due to possible cough symptoms.  Otherwise reports compliance with home medication regimen.  No history of coronary artery disease or previous MI.    In the emergency department routine laboratory studies, chest x-ray, EKG, cardiac enzymes were obtained.  Patient had minimally elevated troponin level.  She was being admitted for possible non ST elevation MI.    Hospital medicine has been asked to admit for further evaluation and treatment.     * No surgery found *      Hospital Course:   Ms. Plascencia presented with chest pain. Patient admitted under observation to rule out cardiac etiology.  Cardiology was consulted.  Initial troponin was 0.027, which later decreased to 0.014.  Patient's chest pain had resolved.  BNP only 45.  Had no physical findings to suggest heart failure.  A transthoracic echocardiogram showed mild tricuspid valve regurgitation but was otherwise normal.  Only mildly anemic and with no signs of infection.  Patient was breathing  comfortably at room air.  A nuclear stress test was negative for myocardial injury or ischemia.  Patient was discharged home in stable condition.  Advised weight loss and healthy diet.  PCP follow-up at date shown below.  Activity as tolerated.     Consults:   Consults (From admission, onward)        Status Ordering Provider     Inpatient consult to Cardiology  Once     Provider:  Veronica Robbins MD    Completed VERONICA RICAHRDSON          Final Active Diagnoses:    Diagnosis Date Noted POA    PRINCIPAL PROBLEM:  Precordial pain [R07.2] 10/29/2019 Yes    Chest pain [R07.9] 10/30/2019 Yes    Hypokalemia [E87.6] 10/29/2019 Yes    Hyperlipidemia [E78.5] 04/07/2019 Yes    Class 3 severe obesity due to excess calories in adult [E66.01] 04/07/2019 Yes    HTN (hypertension) [I10] 04/06/2019 Yes      Problems Resolved During this Admission:       Discharged Condition: good    Disposition: Home or Self Care    Follow Up:  Follow-up Information     Veronica Robbins MD.    Specialties:  Cardiology, INTERVENTIONAL CARDIOLOGY  Contact information:  [address] [facility]  [address]  [facility] [address]  [phone]             Call Joao Wilcox Jr, MD.    Specialty:  Family Medicine  Why:  Please call to schedule your PCP appt in 1 week   Contact information:  [address]  [address]  [facility] [id number] [phone]                 Medications:  Reconciled Home Medications:      Medication List      CONTINUE taking these medications    albuterol 0.63 mg/3 mL Nebu  Commonly known as:  ACCUNEB  Take 0.63 mg by nebulization every 6 (six) hours as needed.     aspirin 81 MG EC tablet  Commonly known as:  ECOTRIN  Take 1 tablet (81 mg total) by mouth once daily.     atorvastatin 20 MG tablet  Commonly known as:  LIPITOR  Take 1 tablet (20 mg total) by mouth once daily.     BREO ELLIPTA INHL  Inhale into the lungs.     cetirizine 10 MG tablet  Commonly known as:  ZYRTEC  ONE TABLET BY  MOUTH AT BEDTIME     colchicine 0.6 mg tablet  Commonly known as:  COLCRYS  Take 0.6 mg by mouth once daily.     cyanocobalamin 1,000 mcg/mL injection     cyclobenzaprine 7.5 MG Tab  Commonly known as:  FEXMID  Take 10 mg by mouth 3 (three) times daily as needed.     ferrous gluconate 324 MG tablet  Commonly known as:  FERGON  Take 324 mg by mouth daily with breakfast.     ferrous sulfate 324 mg (65 mg iron) Tbec  ONE TABLET BY MOUTH once a day     fluticasone propionate 50 mcg/actuation nasal spray  Commonly known as:  FLONASE  2 sprays once daily. In each nostril     hydroCHLOROthiazide 25 MG tablet  Commonly known as:  HYDRODIURIL  Take 1 tablet (25 mg total) by mouth once daily.            Indwelling Lines/Drains at time of discharge:   Lines/Drains/Airways     None                 Time spent on the discharge of patient: > 35 minutes  Patient was seen and examined on the date of discharge and determined to be suitable for discharge.         Chelsea Romo MD  Department of Hospital Medicine  [facility]

## 2020-01-10 ENCOUNTER — OFFICE VISIT (OUTPATIENT)
Dept: CARDIOLOGY | Facility: CLINIC | Age: 65
End: 2020-01-10
Payer: MEDICAID

## 2020-01-10 VITALS
DIASTOLIC BLOOD PRESSURE: 80 MMHG | HEIGHT: 67 IN | HEART RATE: 72 BPM | SYSTOLIC BLOOD PRESSURE: 132 MMHG | BODY MASS INDEX: 45.99 KG/M2 | RESPIRATION RATE: 15 BRPM | WEIGHT: 293 LBS | OXYGEN SATURATION: 98 %

## 2020-01-10 DIAGNOSIS — E66.01 MORBID OBESITY, UNSPECIFIED OBESITY TYPE: ICD-10-CM

## 2020-01-10 DIAGNOSIS — I10 ESSENTIAL HYPERTENSION: Primary | ICD-10-CM

## 2020-01-10 DIAGNOSIS — E78.2 MIXED HYPERLIPIDEMIA: ICD-10-CM

## 2020-01-10 PROCEDURE — 99999 PR PBB SHADOW E&M-EST. PATIENT-LVL III: ICD-10-PCS | Mod: PBBFAC,,, | Performed by: INTERNAL MEDICINE

## 2020-01-10 PROCEDURE — 99213 PR OFFICE/OUTPT VISIT, EST, LEVL III, 20-29 MIN: ICD-10-PCS | Mod: S$PBB,,, | Performed by: INTERNAL MEDICINE

## 2020-01-10 PROCEDURE — 99213 OFFICE O/P EST LOW 20 MIN: CPT | Mod: S$PBB,,, | Performed by: INTERNAL MEDICINE

## 2020-01-10 PROCEDURE — 99213 OFFICE O/P EST LOW 20 MIN: CPT | Mod: PBBFAC | Performed by: INTERNAL MEDICINE

## 2020-01-10 PROCEDURE — 99999 PR PBB SHADOW E&M-EST. PATIENT-LVL III: CPT | Mod: PBBFAC,,, | Performed by: INTERNAL MEDICINE

## 2020-01-10 NOTE — PROGRESS NOTES
CARDIOVASCULAR PROGRESS NOTE    REASON FOR CONSULT:   Santa Plascencia is a 64 y.o. female who presents for follow up of CP, recent hospitalization.    PCP: Jeri  HISTORY OF PRESENT ILLNESS:   Patient comes in for follow-up of her hospitalization for chest pain in late October.  She underwent cardiac testing that time which was normal.  She denies intercurrent symptoms such as angina or dyspnea.  She has had no palpitations, lightheadedness, dizziness, or syncope.  She has had no PND, orthopnea, lower extremity edema.  She denies melena, hematuria, or claudicant symptoms.    CARDIOVASCULAR HISTORY:   none    PAST MEDICAL HISTORY:     Past Medical History:   Diagnosis Date    Asthma     Bronchitis     Hypertension        PAST SURGICAL HISTORY:     Past Surgical History:   Procedure Laterality Date    ANKLE SURGERY       SECTION      CHOLECYSTECTOMY         ALLERGIES AND MEDICATION:     Review of patient's allergies indicates:   Allergen Reactions    Lisinopril Other (See Comments)     angioedema        Medication List           Accurate as of January 10, 2020  2:31 PM. If you have any questions, ask your nurse or doctor.               CONTINUE taking these medications    albuterol 0.63 mg/3 mL Nebu  Commonly known as:  ACCUNEB     aspirin 81 MG EC tablet  Commonly known as:  ECOTRIN  Take 1 tablet (81 mg total) by mouth once daily.     atorvastatin 20 MG tablet  Commonly known as:  LIPITOR  Take 1 tablet (20 mg total) by mouth once daily.     BREO ELLIPTA INHL     cetirizine 10 MG tablet  Commonly known as:  ZYRTEC     colchicine 0.6 mg tablet  Commonly known as:  COLCRYS     cyanocobalamin 1,000 mcg/mL injection     cyclobenzaprine 7.5 MG Tab  Commonly known as:  FEXMID     ferrous gluconate 324 MG tablet  Commonly known as:  FERGON     ferrous sulfate 324 mg (65 mg iron) Tbec     fluticasone propionate 50 mcg/actuation nasal spray  Commonly known as:  FLONASE     hydroCHLOROthiazide 25 MG  tablet  Commonly known as:  HYDRODIURIL  Take 1 tablet (25 mg total) by mouth once daily.            SOCIAL HISTORY:     Social History     Socioeconomic History    Marital status:      Spouse name: Not on file    Number of children: Not on file    Years of education: Not on file    Highest education level: Not on file   Occupational History    Not on file   Social Needs    Financial resource strain: Not on file    Food insecurity:     Worry: Not on file     Inability: Not on file    Transportation needs:     Medical: Not on file     Non-medical: Not on file   Tobacco Use    Smoking status: Never Smoker    Smokeless tobacco: Never Used   Substance and Sexual Activity    Alcohol use: No    Drug use: No    Sexual activity: Not on file   Lifestyle    Physical activity:     Days per week: Not on file     Minutes per session: Not on file    Stress: Not on file   Relationships    Social connections:     Talks on phone: Not on file     Gets together: Not on file     Attends Restoration service: Not on file     Active member of club or organization: Not on file     Attends meetings of clubs or organizations: Not on file     Relationship status: Not on file   Other Topics Concern    Not on file   Social History Narrative    Not on file       FAMILY HISTORY:     Family History   Problem Relation Age of Onset    Hypertension Mother     Stroke Mother        REVIEW OF SYSTEMS:   Review of Systems   Constitutional: Negative for chills, diaphoresis and fever.   HENT: Negative for nosebleeds.    Eyes: Negative for blurred vision, double vision and photophobia.   Respiratory: Negative for hemoptysis, shortness of breath and wheezing.    Cardiovascular: Negative for chest pain, palpitations, orthopnea, claudication, leg swelling and PND.   Gastrointestinal: Negative for abdominal pain, blood in stool, heartburn, melena, nausea and vomiting.   Genitourinary: Negative for flank pain and hematuria.  "  Musculoskeletal: Negative for falls, myalgias and neck pain.   Skin: Negative for rash.   Neurological: Negative for dizziness, seizures, loss of consciousness, weakness and headaches.   Endo/Heme/Allergies: Negative for polydipsia. Does not bruise/bleed easily.   Psychiatric/Behavioral: Negative for depression and memory loss. The patient is not nervous/anxious.        PHYSICAL EXAM:     Vitals:    01/10/20 1418   BP: 132/80   Pulse: 72   Resp: 15    Body mass index is 46.79 kg/m².  Weight: 135.5 kg (298 lb 11.6 oz)   Height: 5' 7" (170.2 cm)     Physical Exam   Constitutional: She is oriented to person, place, and time. She appears well-developed and well-nourished. She is cooperative.  Non-toxic appearance. No distress.   HENT:   Head: Normocephalic and atraumatic.   Eyes: Pupils are equal, round, and reactive to light. Conjunctivae and EOM are normal. No scleral icterus.   Neck: Trachea normal and normal range of motion. Neck supple. Normal carotid pulses and no JVD present. Carotid bruit is not present. No neck rigidity. No tracheal deviation and no edema present. No thyromegaly present.   Cardiovascular: Normal rate, regular rhythm, S1 normal and S2 normal. PMI is not displaced. Exam reveals no gallop and no friction rub.   No murmur heard.  Pulses:       Carotid pulses are 2+ on the right side, and 2+ on the left side.  Pulmonary/Chest: Effort normal and breath sounds normal. No stridor. No respiratory distress. She has no wheezes. She has no rales. She exhibits no tenderness.   Abdominal: Soft. She exhibits no distension. There is no hepatosplenomegaly.   obese   Musculoskeletal: Normal range of motion. She exhibits no edema or tenderness.   Feet:   Right Foot:   Skin Integrity: Negative for ulcer.   Left Foot:   Skin Integrity: Negative for ulcer.   Neurological: She is alert and oriented to person, place, and time. No cranial nerve deficit.   Skin: Skin is warm and dry. No rash noted. No erythema. "   Psychiatric: She has a normal mood and affect. Her speech is normal and behavior is normal.   Vitals reviewed.      DATA:   EKG: (personally reviewed tracing)  10/29/19 SR 87    Laboratory:  CBC:  Recent Labs   Lab 10/29/19  1747 10/30/19  0149 10/30/19  0512   WBC 8.03 9.04 8.06   Hemoglobin 12.3 11.9 L 12.1   Hematocrit 38.6 36.8 L 37.3   Platelets 230 220 210       CHEMISTRIES:  Recent Labs   Lab 04/07/19  0424 07/11/19  1251 10/29/19  1747 10/30/19  0149   Glucose 91  --  97 100   Sodium 141  --  142 142   Potassium 3.7  --  3.0 L 3.4 L   BUN, Bld 14  --  17 12   Creatinine 0.9 0.9 1.0 0.9   eGFR if  >60 >60 >60 >60   eGFR if non African American >60 >60 >60 >60   Calcium 8.9  --  8.7 8.7       CARDIAC BIOMARKERS:  Recent Labs   Lab 10/29/19  1747 10/29/19  2045 10/30/19  0149   Troponin I 0.027 H 0.014 0.014       COAGS:  Recent Labs   Lab 04/06/19  1544 10/29/19  1747   INR 1.1 1.1       LIPIDS/LFTS:  Recent Labs   Lab 04/06/19  1544 10/29/19  1747 10/30/19  0149   Cholesterol 205 H  --   --    Triglycerides 102  --   --    HDL 57  --   --    LDL Cholesterol 127.6  --   --    Non-HDL Cholesterol 148  --   --    AST 15 57 H 105 H   ALT 11 26 67 H       Cardiovascular Testing:  Evon MPI 10/30/19     Probably normal Evon myocardial perfusion study.    The perfusion scan is free of evidence from myocardial ischemia or injury.    There is a trivial to mild intensity defect in the apical wall of the left ventricle, secondary to breast attenuation.    Gated perfusion images showed an ejection fraction of  64 % post stress.    There is normal wall motion post stress.     Echo 10/30/19  · Normal left ventricular systolic function. The estimated ejection fraction is 60%  · No wall motion abnormalities.  · Normal right ventricular systolic function.  · Mild tricuspid regurgitation.  · The estimated PA systolic pressure is 32 mm Hg    Renal art US 5/6/19  Normal appearance of the bilateral  kidneys.  Slightly elevated segmental resistive indices can be associated with  chronic medical kidney disease.  Normal bilateral renal artery waveform configuration.     Carotid US 4/6/19  1.  No evidence of hemodynamically significant stenosis in the right carotid system.  2.  Peak systolic velocity of 153 cm/sec in the left distal ICA, corresponding to approximately 50-69% luminal narrowing of the left distal ICA.    ASSESSMENT:   # CP, no recurrence, MPI/echo 10/2019 normal.  # HTN, controlled  # HLP, on atorva 20mg  # BMI 47    PLAN:   Cont med rx  Diet/exercise/weight loss  RTC prn      Arvin Robbins MD, FACC

## 2020-04-01 ENCOUNTER — HOSPITAL ENCOUNTER (EMERGENCY)
Facility: HOSPITAL | Age: 65
Discharge: HOME | End: 2020-04-02
Attending: EMERGENCY MEDICINE
Payer: MEDICAID

## 2020-04-01 DIAGNOSIS — Z20.822 SUSPECTED COVID-19 VIRUS INFECTION: ICD-10-CM

## 2020-04-01 DIAGNOSIS — J45.909 ASTHMA, UNSPECIFIED ASTHMA SEVERITY, UNSPECIFIED WHETHER COMPLICATED, UNSPECIFIED WHETHER PERSISTENT: ICD-10-CM

## 2020-04-01 DIAGNOSIS — R05.9 COUGH: ICD-10-CM

## 2020-04-01 DIAGNOSIS — R05.9 COUGHING: Primary | ICD-10-CM

## 2020-04-01 DIAGNOSIS — N92.6 IRREGULAR MENSES: ICD-10-CM

## 2020-04-01 PROCEDURE — 25000003 PHARM REV CODE 250: Mod: ER | Performed by: EMERGENCY MEDICINE

## 2020-04-01 PROCEDURE — U0002 COVID-19 LAB TEST NON-CDC: HCPCS

## 2020-04-01 PROCEDURE — 99284 EMERGENCY DEPT VISIT MOD MDM: CPT | Mod: 25,ER

## 2020-04-01 RX ORDER — PROMETHAZINE HYDROCHLORIDE AND DEXTROMETHORPHAN HYDROBROMIDE 6.25; 15 MG/5ML; MG/5ML
5 SYRUP ORAL NIGHTLY PRN
Qty: 100 ML | Refills: 0 | Status: SHIPPED | OUTPATIENT
Start: 2020-04-01 | End: 2020-04-11

## 2020-04-01 RX ORDER — BENZONATATE 100 MG/1
100 CAPSULE ORAL 3 TIMES DAILY PRN
Qty: 30 CAPSULE | Refills: 0 | Status: SHIPPED | OUTPATIENT
Start: 2020-04-01 | End: 2020-04-11

## 2020-04-01 RX ORDER — ACETAMINOPHEN 500 MG
500 TABLET ORAL EVERY 6 HOURS PRN
Qty: 30 TABLET | Refills: 0 | Status: SHIPPED | OUTPATIENT
Start: 2020-04-01 | End: 2020-04-11

## 2020-04-01 RX ORDER — ACETAMINOPHEN 500 MG
1000 TABLET ORAL
Status: COMPLETED | OUTPATIENT
Start: 2020-04-01 | End: 2020-04-01

## 2020-04-01 RX ADMIN — ACETAMINOPHEN 1000 MG: 500 TABLET ORAL at 11:04

## 2020-04-02 VITALS
SYSTOLIC BLOOD PRESSURE: 122 MMHG | WEIGHT: 293 LBS | RESPIRATION RATE: 22 BRPM | DIASTOLIC BLOOD PRESSURE: 85 MMHG | OXYGEN SATURATION: 99 % | BODY MASS INDEX: 47.09 KG/M2 | HEART RATE: 66 BPM | HEIGHT: 66 IN | TEMPERATURE: 99 F

## 2020-04-02 NOTE — ED PROVIDER NOTES
Encounter Date: 2020    SCRIBE #1 NOTE: I, Coleen Nino, am scribing for, and in the presence of,  Dr. Hoyos. I have scribed the following portions of the note - Other sections scribed: HPI, ROS, PE.       History   No chief complaint on file.    Santa Plascencia is a 65 y.o. female who presents to the ED complaining of a cough, chills and disrrhea for the last __ days. Pt uses tomlin at baseline.     The history is provided by the patient. No  was used.     Review of patient's allergies indicates:   Allergen Reactions    Lisinopril Other (See Comments)     angioedema     Past Medical History:   Diagnosis Date    Asthma     Bronchitis     Hypertension      Past Surgical History:   Procedure Laterality Date    ANKLE SURGERY       SECTION      CHOLECYSTECTOMY       Family History   Problem Relation Age of Onset    Hypertension Mother     Stroke Mother      Social History     Tobacco Use    Smoking status: Never Smoker    Smokeless tobacco: Never Used   Substance Use Topics    Alcohol use: No    Drug use: No     Review of Systems   Constitutional: Positive for chills. Negative for fever.   HENT: Negative.  Negative for sore throat.    Eyes: Negative.    Respiratory: Positive for cough. Negative for shortness of breath.    Cardiovascular: Negative.  Negative for chest pain.   Gastrointestinal: Positive for diarrhea. Negative for nausea and vomiting.   Endocrine: Negative.    Genitourinary: Negative.  Negative for dysuria.   Musculoskeletal: Negative.  Negative for myalgias.   Skin: Negative.  Negative for rash.   Allergic/Immunologic: Negative.    Neurological: Negative.  Negative for headaches.   Hematological: Negative.  Negative for adenopathy.   Psychiatric/Behavioral: Negative.  Negative for behavioral problems.   All other systems reviewed and are negative.      Physical Exam     Initial Vitals   BP Pulse Resp Temp SpO2   -- -- -- -- --      MAP       --          Physical Exam    Nursing note and vitals reviewed.  Constitutional: She appears well-developed and well-nourished.   HENT:   Head: Normocephalic and atraumatic.   Right Ear: External ear normal.   Left Ear: External ear normal.   Nose: Nose normal.   Eyes: Conjunctivae are normal.   Neck: Normal range of motion. Neck supple.   Cardiovascular: Normal rate and intact distal pulses.   Pulmonary/Chest: Effort normal. No respiratory distress.   Abdominal: Soft. There is no tenderness.   Musculoskeletal: Normal range of motion.   Neurological: She is alert and oriented to person, place, and time.   Skin: Skin is warm and dry. Capillary refill takes less than 2 seconds.   Psychiatric: She has a normal mood and affect. Her behavior is normal.         ED Course   Procedures  Labs Reviewed - No data to display       Imaging Results    None          Medical Decision Making:   History:   Old Medical Records: I decided to obtain old medical records.            Scribe Attestation:   Scribe #1: I performed the above scribed service and the documentation accurately describes the services I performed. I attest to the accuracy of the note.    ***                      Clinical Impression:   No diagnosis found.

## 2020-04-02 NOTE — ED PROVIDER NOTES
Encounter Date: 2020       History     Chief Complaint   Patient presents with    Cough     X 1 DAY. REPORTS PRODUCTIVE COUGH. REPORTS TAKING A NEB TX TODAY FOR COUGH    Chills    Diarrhea     65-year-old female presents to the emergency room tonight with complaint of coughing, shortness of breath, and chills since yesterday.  She denies any wheezing, sore throat, sinus congestion, ear pain, abdominal pain, nausea, vomiting, or diarrhea.  She denies any headaches, dizziness, or blurred vision.  She denies any cardiac chest pain, heart palpitations, or swelling to lower extremities.  She has asthma and hypertension.  She states she used her nebulizer machine prior to arrival which helped resolve the cough.  Her temperature is 98.4.  Her pulse ox on room air is 98%.She also complains of vaginal bleeding that lasted 5 days then ended yesterday. She has not had any vaginal bleeding in several years.         Review of patient's allergies indicates:   Allergen Reactions    Lisinopril Other (See Comments)     angioedema     Past Medical History:   Diagnosis Date    Asthma     Bronchitis     Hypertension      Past Surgical History:   Procedure Laterality Date    ANKLE SURGERY       SECTION      CHOLECYSTECTOMY       Family History   Problem Relation Age of Onset    Hypertension Mother     Stroke Mother      Social History     Tobacco Use    Smoking status: Never Smoker    Smokeless tobacco: Never Used   Substance Use Topics    Alcohol use: No    Drug use: No     Review of Systems   Constitutional: Positive for chills. Negative for fever.   HENT: Negative for congestion, ear discharge, ear pain, postnasal drip, rhinorrhea, sinus pressure, sneezing and sore throat.    Eyes: Negative for pain, discharge and itching.   Respiratory: Positive for cough and shortness of breath. Negative for wheezing.    Cardiovascular: Negative for chest pain, palpitations and leg swelling.   Gastrointestinal: Negative  for abdominal pain, diarrhea, nausea and vomiting.   Genitourinary:        History of vaginal bleeding that ended yesterday for 5 days    Musculoskeletal: Negative for back pain, neck pain and neck stiffness.   Skin: Negative for rash.   Neurological: Negative for dizziness, light-headedness and headaches.   Hematological: Negative for adenopathy.       Physical Exam     Initial Vitals [04/01/20 2236]   BP Pulse Resp Temp SpO2   114/87 61 20 98.4 °F (36.9 °C) 98 %      MAP       --         Physical Exam    Nursing note and vitals reviewed.  Constitutional: She appears well-developed and well-nourished. She is not diaphoretic. No distress.   HENT:   Head: Normocephalic and atraumatic.   Right Ear: External ear normal.   Left Ear: External ear normal.   Nose: Nose normal.   Mouth/Throat: Oropharynx is clear and moist. No oropharyngeal exudate.   Eyes: Conjunctivae and EOM are normal. Pupils are equal, round, and reactive to light. Right eye exhibits no discharge. Left eye exhibits no discharge. No scleral icterus.   Neck: Normal range of motion. Neck supple.   Cardiovascular: Normal rate, regular rhythm and normal heart sounds. Exam reveals no gallop and no friction rub.    No murmur heard.  Pulmonary/Chest: Breath sounds normal.   Abdominal: Soft. Bowel sounds are normal. There is no tenderness.   Musculoskeletal: Normal range of motion. She exhibits no edema.   Lymphadenopathy:     She has no cervical adenopathy.   Neurological: She is alert and oriented to person, place, and time.   Skin: Skin is warm and dry.   Psychiatric: She has a normal mood and affect. Her behavior is normal. Judgment and thought content normal.         ED Course   Procedures  Labs Reviewed   SARS-COV-2 (COVID-19) QUALITATIVE PCR          Imaging Results          X-Ray Chest 1 View (Final result)  Result time 04/01/20 23:17:03    Final result by Dalton Pantoja MD (04/01/20 23:17:03)                 Impression:      No acute  process.      Electronically signed by: Dalton Pantoja MD  Date:    04/01/2020  Time:    23:17             Narrative:    EXAMINATION:  XR CHEST 1 VIEW    CLINICAL HISTORY:  Cough    TECHNIQUE:  Single frontal view of the chest was performed.    COMPARISON:  10/29/2019.    FINDINGS:  The trachea is unremarkable.  The cardiomediastinal silhouette is within normal limits.  The hilar structures are unremarkable.  The hemidiaphragms are within normal limits.  There is no evidence of free air beneath the hemidiaphragms.  There are no pleural effusions.  There is no evidence of a pneumothorax.  There is no evidence of pneumomediastinum.  No airspace opacity is present.  The osseous structures are unremarkable.                                 Medical Decision Making:   Initial Assessment:   65-year-old female presents to the emergency room tonight with complaint of coughing, shortness of breath, and chills since yesterday.  She denies any wheezing, sore throat, sinus congestion, ear pain, abdominal pain, nausea, vomiting, or diarrhea.  She denies any headaches, dizziness, or blurred vision.  She denies any cardiac chest pain, heart palpitations, or swelling to lower extremities.  She has asthma and hypertension.  She states she used her nebulizer machine prior to arrival which helped resolve the cough.  Her temperature is 98.4.  Her pulse ox on room air is 98%.She also complains of vaginal bleeding that lasted 5 days then ended yesterday. She has not had any vaginal bleeding in several years.     Differential Diagnosis:   Pneumonia, pneumothorax, pleural effusion, asthma, dysfunctional uterine bleeding  Clinical Tests:   Radiological Study: Reviewed  ED Management:  Chest x-ray showed no acute process.  Tylenol 1000 mg was given in the emergency room for body aches.  COVID testing was done pending results.                                 Clinical Impression:       ICD-10-CM ICD-9-CM   1. Coughing R05 786.2   2. Cough R05  786.2   3. Asthma, unspecified asthma severity, unspecified whether complicated, unspecified whether persistent J45.909 493.90   4. Suspected Covid-19 Virus Infection R68.89    5. Irregular menses N92.6 626.4             ED Disposition Condition    Discharge Stable        ED Prescriptions     Medication Sig Dispense Start Date End Date Auth. Provider    acetaminophen (TYLENOL) 500 MG tablet Take 1 tablet (500 mg total) by mouth every 6 (six) hours as needed for Pain. 30 tablet 4/1/2020 4/11/2020 ANGEL Yu    benzonatate (TESSALON) 100 MG capsule Take 1 capsule (100 mg total) by mouth 3 (three) times daily as needed for Cough. 30 capsule 4/1/2020 4/11/2020 ANGEL Yu    promethazine-dextromethorphan (PROMETHAZINE-DM) 6.25-15 mg/5 mL Syrp Take 5 mLs by mouth nightly as needed. 100 mL 4/1/2020 4/11/2020 ANGEL Yu        Follow-up Information     Follow up With Specialties Details Why Contact Info    Joao Wilcox Jr., MD Family Medicine In 1 week If symptoms worsen [address]  [address]  [facility] [address]  [phone]                                       ANGEL Yu  04/01/20 7814

## 2020-04-02 NOTE — DISCHARGE INSTRUCTIONS
Tylenol 1000 mg every 6 hours as needed for fever, body aches or discomfort  Avoid Ibuprofen, Aleve, and Advil  Tessalon 100 mg one every 8 hours as needed for coughing  Phenergan DM one tsp every night as needed for coughing  Covid precautions  You will get a call with your covid test results   Use Albuterol neb machine or inhaler as needed for coughing and /or wheezing   Follow up with your OBGYN in 1 month for irregular vaginal bleeding

## 2020-04-03 ENCOUNTER — TELEPHONE (OUTPATIENT)
Dept: EMERGENCY MEDICINE | Facility: HOSPITAL | Age: 65
End: 2020-04-03

## 2020-04-03 LAB — SARS-COV-2 RNA RESP QL NAA+PROBE: DETECTED

## 2020-04-06 NOTE — PHYSICIAN QUERY
PT Name: Santa Plascencia  MR #: [id number]     Physician Query Form - Documentation Clarification      CDS/: Rose Estrada               Contact information:    This form is a permanent document in the medical record.     Query Date: April 6, 2020    By submitting this query, we are merely seeking further clarification of documentation. Please utilize your independent clinical judgment when addressing the question(s) below.    The Medical record reflects the following:    Supporting Clinical Findings Location in Medical Record   Positive COVID 19 test       Labs   Cough, shortness of breath, chills                                                                                   Doctor, Please specify diagnosis or diagnoses associated with above clinical findings.    Provider Use Only    Cough, SOB and chills                                                                                                                             [  ] Clinically Undetermined

## 2020-07-23 ENCOUNTER — HOSPITAL ENCOUNTER (EMERGENCY)
Facility: HOSPITAL | Age: 65
Discharge: HOME | End: 2020-07-23
Attending: EMERGENCY MEDICINE
Payer: MEDICAID

## 2020-07-23 VITALS
HEIGHT: 66 IN | RESPIRATION RATE: 18 BRPM | DIASTOLIC BLOOD PRESSURE: 68 MMHG | BODY MASS INDEX: 47.09 KG/M2 | WEIGHT: 293 LBS | TEMPERATURE: 98 F | HEART RATE: 88 BPM | OXYGEN SATURATION: 98 % | SYSTOLIC BLOOD PRESSURE: 178 MMHG

## 2020-07-23 DIAGNOSIS — M25.562 BILATERAL KNEE PAIN: ICD-10-CM

## 2020-07-23 DIAGNOSIS — S80.10XA CONTUSION OF LOWER LEG, UNSPECIFIED LATERALITY, INITIAL ENCOUNTER: ICD-10-CM

## 2020-07-23 DIAGNOSIS — T14.8XXA ABRASION: ICD-10-CM

## 2020-07-23 DIAGNOSIS — T14.8XXA HEMATOMA: ICD-10-CM
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
## 2020-07-23 DIAGNOSIS — M25.561 BILATERAL KNEE PAIN: ICD-10-CM

## 2020-07-23 PROCEDURE — 99283 EMERGENCY DEPT VISIT LOW MDM: CPT | Mod: 25,ER

## 2020-07-23 RX ORDER — ACETAMINOPHEN 325 MG/1
650 TABLET ORAL EVERY 6 HOURS PRN
Qty: 60 TABLET | Refills: 0 | Status: SHIPPED | OUTPATIENT
Start: 2020-07-23

## 2020-07-23 NOTE — PROVIDER PROGRESS NOTES - EMERGENCY DEPT.
" Emergency Department TeleTRIAGE Encounter Note      CHIEF COMPLAINT    Chief Complaint   Patient presents with    Knee Pain     Pt to ER with c/o bilateral knee. Pt reports trip and fall on the 13th and pain "isnt getting better"        VITAL SIGNS   Initial Vitals [07/23/20 1554]   BP Pulse Resp Temp SpO2   (!) 183/73 90 18 97.8 °F (36.6 °C) 98 %      MAP       --            ALLERGIES    Review of patient's allergies indicates:   Allergen Reactions    Lisinopril Other (See Comments)     angioedema       PROVIDER TRIAGE NOTE  Santa Plascencia is a 65 y.o. female presenting for evaluation of bilateral knee pain since tripping and falling over a crate onto her knees on 7/13/20.  She has noticed persistent pain and now some swelling in the knees, left greater than right.  Will obtain x-rays.      ORDERS  Labs Reviewed - No data to display    ED Orders (720h ago, onward)    Start Ordered     Status Ordering Provider    07/23/20 1624 07/23/20 1624  X-Ray Knee 3 View Bilateral  1 time imaging      Ordered RAJWINDER GUIDO            Virtual Visit Note: The provider triage portion of this emergency department evaluation and documentation was performed via [facility], a HIPAA-compliant telemedicine application, in concert with a tele-presenter in the room. A face to face patient evaluation with one of my colleagues will occur once the patient is placed in an emergency department room.      DISCLAIMER: This note was prepared with [employer] voice recognition transcription software. Garbled syntax, mangled pronouns, and other bizarre constructions may be attributed to that software system.  "

## 2020-07-23 NOTE — ED PROVIDER NOTES
"Encounter Date: 2020    SCRIBE #1 NOTE: I, Leilani Oneil , am scribing for, and in the presence of,  Bridgett Jamil NP . I have scribed the following portions of the note - Other sections scribed: HPI, ROS, PE .       History     Chief Complaint   Patient presents with    Knee Pain     Pt to ER with c/o bilateral knee. Pt reports trip and fall on the  and pain "isnt getting better"      Santa Plascencia is a 65 y.o. female who presents to the ED complaining of bilateral knee pain s/p trip and fall . Pt states she tripped over a crate and fell down to the concrete. She has attempted treatment with Ibuprofen and tylenol. The swelling to the area improves with elevation and warm showers. She is concerned because the scrapes to area are slow to heal. There are no other complaints at this time.     The history is provided by the patient. No  was used.     Review of patient's allergies indicates:   Allergen Reactions    Lisinopril Other (See Comments)     angioedema     Past Medical History:   Diagnosis Date    Asthma     Bronchitis     Hypertension      Past Surgical History:   Procedure Laterality Date    ANKLE SURGERY       SECTION      CHOLECYSTECTOMY       Family History   Problem Relation Age of Onset    Hypertension Mother     Stroke Mother      Social History     Tobacco Use    Smoking status: Never Smoker    Smokeless tobacco: Never Used   Substance Use Topics    Alcohol use: No    Drug use: No     Review of Systems   Constitutional: Negative for fever.   Musculoskeletal: Positive for arthralgias (bilateral knees ).   Skin: Positive for wound.   All other systems reviewed and are negative.  Physical Exam   Constitutional: She is oriented to person, place, and time and well-developed, well-nourished, and in no distress.   HENT:   Head: Normocephalic.   Eyes: Conjunctivae are normal.   Neck: Normal range of motion. Neck supple.   Musculoskeletal: Normal " range of motion.         General: Tenderness present. No deformity.      Comments: bilat proximal tibia have superficial abrasions with +swelling/ecchymosis.  Resolving hematomas   Neurological: She is alert and oriented to person, place, and time. GCS score is 15.   Skin: Skin is warm.   Psychiatric: Affect normal.       Physical Exam     Initial Vitals [07/23/20 1554]   BP Pulse Resp Temp SpO2   (!) 183/73 90 18 97.8 °F (36.6 °C) 98 %      MAP       --             ED Course   Procedures  Labs Reviewed - No data to display       Imaging Results          X-Ray Knee 1 or 2 View Bilateral (Final result)  Result time 07/23/20 17:05:38   Procedure changed from X-Ray Knee 3 View Bilateral     Final result by Dalton Pantoja MD (07/23/20 17:05:38)                 Impression:      No acute process.      Electronically signed by: Dalton Pantoja MD  Date:    07/23/2020  Time:    17:05             Narrative:    EXAMINATION:  XR KNEE 1 OR 2 VIEW BILATERAL    CLINICAL HISTORY:  pain ;  Unspecified fall, initial encounter    TECHNIQUE:  Frontal and lateral radiographs of both knees.    COMPARISON:  None    FINDINGS:  Right knee:    The bone mineralization is within normal limits.  There is no evidence of periostitis.  There is no cortical step-off.    There is mild tricompartmental joint space narrowing.  No significant osteophytosis is identified..    Left knee:    The bone mineralization is within normal limits.  There is no cortical step-off.  There is no evidence of periostitis.  There is mild tricompartmental joint space narrowing.                                   Medical Decision Making:   Differential Diagnosis:   Contusion  fx  Sprain  Hematoma    Clinical Tests:   Radiological Study: Ordered and Reviewed  ED Management:  Diagnosis management comments: This is an urgent evaluation of a  65-year-old female that presented to the ER with c/o slow healing wounds to bilateral will proximal tibia after fall 2wks ago . Pts exam was  as above.      xrays were reviewed and discussed with pt.     Patient has full range of motion.  There appears to be very superficial abrasions present and swelling/ecchymosis under abrasions.  Most likely the area of injury resulted in hematomas under the abrasions that are just taking a long time to reabsorb patient has been instructed to continue with her Tylenol and elevation.  She states that the swelling goes down after she elevates but returns after being up on her feet all day.  She has follow-up with her primary care provider as needed.    Based on exam today - I have low suspicion for medical, surgical or other life threatening condition and I believe pt is safe for discharge and outpatient f/u.    Pt verbalizes understanding of d/c instructions and will return for worsening condition.                Scribe Attestation:   Scribe #1: I performed the above scribed service and the documentation accurately describes the services I performed. I attest to the accuracy of the note.    I, PETRA Jamil, personally performed the services described in this documentation.  All medical record entries made by the scribe were at my direction and in my presence.  I have reviewed the chart and agree that the record reflects my personal performance and is accurate and complete.                         Clinical Impression:     1. Fall, initial encounter    2. Fall    3. Bilateral knee pain    4. Contusion of lower leg, unspecified laterality, initial encounter    5. Abrasion    6. Hematoma            Disposition:   Disposition: Discharged  Condition: Stable     ED Disposition Condition    Discharge Stable        ED Prescriptions     Medication Sig Dispense Start Date End Date Auth. Provider    acetaminophen (TYLENOL) 325 MG tablet Take 2 tablets (650 mg total) by mouth every 6 (six) hours as needed for Pain (or fever). 60 tablet 7/23/2020  Bridgett Jamil NP        Follow-up Information     Follow up With Specialties Details  Why Contact Info    Joao Wilcox Jr., MD Family Medicine Schedule an appointment as soon as possible for a visit   [address]  [address]  [place] [facility] [address]  [phone]      [facility] Emergency Department Emergency Medicine  If symptoms worsen or any other concerns [address] [facility] [address] [phone]

## 2021-04-28 ENCOUNTER — HOSPITAL ENCOUNTER (EMERGENCY)
Facility: HOSPITAL | Age: 66
Discharge: HOME | End: 2021-04-28
Attending: EMERGENCY MEDICINE
Payer: MEDICARE

## 2021-04-28 VITALS
OXYGEN SATURATION: 98 % | DIASTOLIC BLOOD PRESSURE: 57 MMHG | HEIGHT: 66 IN | BODY MASS INDEX: 47.09 KG/M2 | RESPIRATION RATE: 33 BRPM | TEMPERATURE: 98 F | SYSTOLIC BLOOD PRESSURE: 135 MMHG | HEART RATE: 83 BPM | WEIGHT: 293 LBS

## 2021-04-28 DIAGNOSIS — R10.11 RIGHT UPPER QUADRANT ABDOMINAL PAIN: Primary | ICD-10-CM

## 2021-04-28 DIAGNOSIS — R10.13 DYSPEPSIA: ICD-10-CM

## 2021-04-28 LAB
ALBUMIN SERPL BCP-MCNC: 3.2 G/DL (ref 3.5–5.2)
ALP SERPL-CCNC: 116 U/L (ref 55–135)
ALT SERPL W/O P-5'-P-CCNC: 28 U/L (ref 10–44)
ANION GAP SERPL CALC-SCNC: 7 MMOL/L (ref 8–16)
AST SERPL-CCNC: 63 U/L (ref 10–40)
BASOPHILS # BLD AUTO: 0.02 K/UL (ref 0–0.2)
BASOPHILS NFR BLD: 0.2 % (ref 0–1.9)
BILIRUB SERPL-MCNC: 1 MG/DL (ref 0.1–1)
BILIRUB UR QL STRIP: NEGATIVE
BUN SERPL-MCNC: 14 MG/DL (ref 8–23)
CALCIUM SERPL-MCNC: 8.4 MG/DL (ref 8.7–10.5)
CHLORIDE SERPL-SCNC: 106 MMOL/L (ref 95–110)
CLARITY UR: CLEAR
CO2 SERPL-SCNC: 31 MMOL/L (ref 23–29)
COLOR UR: YELLOW
CREAT SERPL-MCNC: 1 MG/DL (ref 0.5–1.4)
DIFFERENTIAL METHOD: ABNORMAL
EOSINOPHIL # BLD AUTO: 0.1 K/UL (ref 0–0.5)
EOSINOPHIL NFR BLD: 1.3 % (ref 0–8)
ERYTHROCYTE [DISTWIDTH] IN BLOOD BY AUTOMATED COUNT: 12.9 % (ref 11.5–14.5)
EST. GFR  (AFRICAN AMERICAN): >60 ML/MIN/1.73 M^2
EST. GFR  (NON AFRICAN AMERICAN): 59 ML/MIN/1.73 M^2
GLUCOSE SERPL-MCNC: 116 MG/DL (ref 70–110)
GLUCOSE UR QL STRIP: NEGATIVE
HCT VFR BLD AUTO: 34.7 % (ref 37–48.5)
HGB BLD-MCNC: 11.7 G/DL (ref 12–16)
HGB UR QL STRIP: NEGATIVE
IMM GRANULOCYTES # BLD AUTO: 0.03 K/UL (ref 0–0.04)
IMM GRANULOCYTES NFR BLD AUTO: 0.4 % (ref 0–0.5)
KETONES UR QL STRIP: NEGATIVE
LEUKOCYTE ESTERASE UR QL STRIP: NEGATIVE
LIPASE SERPL-CCNC: 25 U/L (ref 4–60)
LYMPHOCYTES # BLD AUTO: 1.8 K/UL (ref 1–4.8)
LYMPHOCYTES NFR BLD: 22.1 % (ref 18–48)
MAGNESIUM SERPL-MCNC: 1.7 MG/DL (ref 1.6–2.6)
MCH RBC QN AUTO: 31.6 PG (ref 27–31)
MCHC RBC AUTO-ENTMCNC: 33.7 G/DL (ref 32–36)
MCV RBC AUTO: 94 FL (ref 82–98)
MONOCYTES # BLD AUTO: 0.3 K/UL (ref 0.3–1)
MONOCYTES NFR BLD: 4.1 % (ref 4–15)
NEUTROPHILS # BLD AUTO: 5.9 K/UL (ref 1.8–7.7)
NEUTROPHILS NFR BLD: 71.9 % (ref 38–73)
NITRITE UR QL STRIP: NEGATIVE
NRBC BLD-RTO: 0 /100 WBC
PH UR STRIP: 7 [PH] (ref 5–8)
PLATELET # BLD AUTO: 216 K/UL (ref 150–450)
PMV BLD AUTO: 11.1 FL (ref 9.2–12.9)
POTASSIUM SERPL-SCNC: 3 MMOL/L (ref 3.5–5.1)
PROT SERPL-MCNC: 7.2 G/DL (ref 6–8.4)
PROT UR QL STRIP: NEGATIVE
RBC # BLD AUTO: 3.7 M/UL (ref 4–5.4)
SODIUM SERPL-SCNC: 144 MMOL/L (ref 136–145)
SP GR UR STRIP: 1.02 (ref 1–1.03)
URN SPEC COLLECT METH UR: ABNORMAL
UROBILINOGEN UR STRIP-ACNC: ABNORMAL EU/DL
WBC # BLD AUTO: 8.25 K/UL (ref 3.9–12.7)

## 2021-04-28 PROCEDURE — 81003 URINALYSIS AUTO W/O SCOPE: CPT | Performed by: EMERGENCY MEDICINE

## 2021-04-28 PROCEDURE — 85025 COMPLETE CBC W/AUTO DIFF WBC: CPT | Performed by: EMERGENCY MEDICINE

## 2021-04-28 PROCEDURE — 83735 ASSAY OF MAGNESIUM: CPT | Performed by: EMERGENCY MEDICINE

## 2021-04-28 PROCEDURE — 25000003 PHARM REV CODE 250: Performed by: EMERGENCY MEDICINE

## 2021-04-28 PROCEDURE — 99284 EMERGENCY DEPT VISIT MOD MDM: CPT | Mod: 25

## 2021-04-28 PROCEDURE — 80053 COMPREHEN METABOLIC PANEL: CPT | Performed by: EMERGENCY MEDICINE

## 2021-04-28 PROCEDURE — 83690 ASSAY OF LIPASE: CPT | Performed by: EMERGENCY MEDICINE

## 2021-04-28 RX ORDER — OMEPRAZOLE 20 MG/1
20 CAPSULE, DELAYED RELEASE ORAL DAILY
Qty: 30 CAPSULE | Refills: 0 | Status: SHIPPED | OUTPATIENT
Start: 2021-04-28 | End: 2022-04-28

## 2021-04-28 RX ORDER — DICYCLOMINE HYDROCHLORIDE 20 MG/1
20 TABLET ORAL 2 TIMES DAILY
Qty: 20 TABLET | Refills: 0 | Status: SHIPPED | OUTPATIENT
Start: 2021-04-28 | End: 2021-05-28

## 2021-04-28 RX ORDER — FAMOTIDINE 20 MG/1
20 TABLET, FILM COATED ORAL
Status: COMPLETED | OUTPATIENT
Start: 2021-04-28 | End: 2021-04-28

## 2021-04-28 RX ADMIN — FAMOTIDINE 20 MG: 20 TABLET, FILM COATED ORAL at 09:04

## 2022-01-28 DIAGNOSIS — Z12.31 VISIT FOR SCREENING MAMMOGRAM: Primary | ICD-10-CM

## 2023-11-20 ENCOUNTER — HOSPITAL ENCOUNTER (EMERGENCY)
Facility: HOSPITAL | Age: 68
Discharge: HOME | End: 2023-11-20
Attending: EMERGENCY MEDICINE
Payer: MEDICARE

## 2023-11-20 VITALS
SYSTOLIC BLOOD PRESSURE: 178 MMHG | RESPIRATION RATE: 14 BRPM | HEART RATE: 108 BPM | DIASTOLIC BLOOD PRESSURE: 76 MMHG | HEIGHT: 66 IN | WEIGHT: 293 LBS | OXYGEN SATURATION: 94 % | TEMPERATURE: 99 F | BODY MASS INDEX: 47.09 KG/M2

## 2023-11-20 DIAGNOSIS — R05.9 COUGH: ICD-10-CM

## 2023-11-20 DIAGNOSIS — R53.83 FATIGUE, UNSPECIFIED TYPE: ICD-10-CM

## 2023-11-20 DIAGNOSIS — R07.9 CHEST PAIN: ICD-10-CM

## 2023-11-20 DIAGNOSIS — J11.1 INFLUENZA: Primary | ICD-10-CM

## 2023-11-20 LAB
ALBUMIN SERPL BCP-MCNC: 3.5 G/DL (ref 3.5–5.2)
ALP SERPL-CCNC: 93 U/L (ref 55–135)
ALT SERPL W/O P-5'-P-CCNC: 12 U/L (ref 10–44)
ANION GAP SERPL CALC-SCNC: 11 MMOL/L (ref 8–16)
AST SERPL-CCNC: 23 U/L (ref 10–40)
BASOPHILS # BLD AUTO: 0.02 K/UL (ref 0–0.2)
BASOPHILS NFR BLD: 0.3 % (ref 0–1.9)
BILIRUB SERPL-MCNC: 0.9 MG/DL (ref 0.1–1)
BNP SERPL-MCNC: 72 PG/ML (ref 0–99)
BUN SERPL-MCNC: 9 MG/DL (ref 8–23)
CALCIUM SERPL-MCNC: 9.1 MG/DL (ref 8.7–10.5)
CHLORIDE SERPL-SCNC: 104 MMOL/L (ref 95–110)
CO2 SERPL-SCNC: 24 MMOL/L (ref 23–29)
CREAT SERPL-MCNC: 0.9 MG/DL (ref 0.5–1.4)
CTP QC/QA: YES
CTP QC/QA: YES
DIFFERENTIAL METHOD: ABNORMAL
EOSINOPHIL # BLD AUTO: 0 K/UL (ref 0–0.5)
EOSINOPHIL NFR BLD: 0.3 % (ref 0–8)
ERYTHROCYTE [DISTWIDTH] IN BLOOD BY AUTOMATED COUNT: 13.2 % (ref 11.5–14.5)
EST. GFR  (NO RACE VARIABLE): >60 ML/MIN/1.73 M^2
GLUCOSE SERPL-MCNC: 109 MG/DL (ref 70–110)
HCT VFR BLD AUTO: 39.6 % (ref 37–48.5)
HGB BLD-MCNC: 12.6 G/DL (ref 12–16)
IMM GRANULOCYTES # BLD AUTO: 0.03 K/UL (ref 0–0.04)
IMM GRANULOCYTES NFR BLD AUTO: 0.4 % (ref 0–0.5)
LYMPHOCYTES # BLD AUTO: 0.3 K/UL (ref 1–4.8)
LYMPHOCYTES NFR BLD: 3.8 % (ref 18–48)
MCH RBC QN AUTO: 30.6 PG (ref 27–31)
MCHC RBC AUTO-ENTMCNC: 31.8 G/DL (ref 32–36)
MCV RBC AUTO: 96 FL (ref 82–98)
MONOCYTES # BLD AUTO: 0.4 K/UL (ref 0.3–1)
MONOCYTES NFR BLD: 5 % (ref 4–15)
NEUTROPHILS # BLD AUTO: 6.5 K/UL (ref 1.8–7.7)
NEUTROPHILS NFR BLD: 90.2 % (ref 38–73)
NRBC BLD-RTO: 0 /100 WBC
PLATELET # BLD AUTO: 197 K/UL (ref 150–450)
PMV BLD AUTO: 11.1 FL (ref 9.2–12.9)
POC MOLECULAR INFLUENZA A AGN: POSITIVE
POC MOLECULAR INFLUENZA B AGN: NEGATIVE
POTASSIUM SERPL-SCNC: 3.7 MMOL/L (ref 3.5–5.1)
PROT SERPL-MCNC: 7.9 G/DL (ref 6–8.4)
RBC # BLD AUTO: 4.12 M/UL (ref 4–5.4)
SARS-COV-2 RDRP RESP QL NAA+PROBE: NEGATIVE
SODIUM SERPL-SCNC: 139 MMOL/L (ref 136–145)
TROPONIN I SERPL DL<=0.01 NG/ML-MCNC: 0.01 NG/ML (ref 0–0.03)
WBC # BLD AUTO: 7.19 K/UL (ref 3.9–12.7)

## 2023-11-20 PROCEDURE — 84484 ASSAY OF TROPONIN QUANT: CPT | Performed by: EMERGENCY MEDICINE

## 2023-11-20 PROCEDURE — 94640 AIRWAY INHALATION TREATMENT: CPT

## 2023-11-20 PROCEDURE — 63600175 PHARM REV CODE 636 W HCPCS: Performed by: EMERGENCY MEDICINE

## 2023-11-20 PROCEDURE — 93010 EKG 12-LEAD: ICD-10-PCS | Mod: ,,, | Performed by: INTERNAL MEDICINE

## 2023-11-20 PROCEDURE — 83880 ASSAY OF NATRIURETIC PEPTIDE: CPT | Performed by: EMERGENCY MEDICINE

## 2023-11-20 PROCEDURE — 25000242 PHARM REV CODE 250 ALT 637 W/ HCPCS: Performed by: EMERGENCY MEDICINE

## 2023-11-20 PROCEDURE — 96361 HYDRATE IV INFUSION ADD-ON: CPT

## 2023-11-20 PROCEDURE — 80053 COMPREHEN METABOLIC PANEL: CPT | Performed by: EMERGENCY MEDICINE

## 2023-11-20 PROCEDURE — 96374 THER/PROPH/DIAG INJ IV PUSH: CPT

## 2023-11-20 PROCEDURE — 99285 EMERGENCY DEPT VISIT HI MDM: CPT | Mod: 25

## 2023-11-20 PROCEDURE — 85025 COMPLETE CBC W/AUTO DIFF WBC: CPT | Performed by: EMERGENCY MEDICINE

## 2023-11-20 PROCEDURE — 93010 ELECTROCARDIOGRAM REPORT: CPT | Mod: ,,, | Performed by: INTERNAL MEDICINE

## 2023-11-20 PROCEDURE — 25000003 PHARM REV CODE 250: Performed by: EMERGENCY MEDICINE

## 2023-11-20 PROCEDURE — 87502 INFLUENZA DNA AMP PROBE: CPT

## 2023-11-20 PROCEDURE — 87635 SARS-COV-2 COVID-19 AMP PRB: CPT | Performed by: EMERGENCY MEDICINE

## 2023-11-20 PROCEDURE — 93005 ELECTROCARDIOGRAM TRACING: CPT

## 2023-11-20 RX ORDER — IBUPROFEN 400 MG/1
800 TABLET ORAL
Status: COMPLETED | OUTPATIENT
Start: 2023-11-20 | End: 2023-11-20

## 2023-11-20 RX ORDER — IBUPROFEN 800 MG/1
800 TABLET ORAL EVERY 6 HOURS PRN
Qty: 40 TABLET | Refills: 0 | Status: SHIPPED | OUTPATIENT
Start: 2023-11-20 | End: 2023-11-20 | Stop reason: SDUPTHER

## 2023-11-20 RX ORDER — IPRATROPIUM BROMIDE AND ALBUTEROL SULFATE 2.5; .5 MG/3ML; MG/3ML
3 SOLUTION RESPIRATORY (INHALATION)
Status: COMPLETED | OUTPATIENT
Start: 2023-11-20 | End: 2023-11-20

## 2023-11-20 RX ORDER — BENZONATATE 100 MG/1
100 CAPSULE ORAL ONCE
Status: COMPLETED | OUTPATIENT
Start: 2023-11-20 | End: 2023-11-20

## 2023-11-20 RX ORDER — OSELTAMIVIR PHOSPHATE 75 MG/1
75 CAPSULE ORAL 2 TIMES DAILY
Qty: 10 CAPSULE | Refills: 0 | Status: SHIPPED | OUTPATIENT
Start: 2023-11-20 | End: 2023-11-25

## 2023-11-20 RX ORDER — OSELTAMIVIR PHOSPHATE 75 MG/1
75 CAPSULE ORAL 2 TIMES DAILY
Qty: 10 CAPSULE | Refills: 0 | Status: SHIPPED | OUTPATIENT
Start: 2023-11-20 | End: 2023-11-20 | Stop reason: SDUPTHER

## 2023-11-20 RX ORDER — IBUPROFEN 800 MG/1
800 TABLET ORAL EVERY 6 HOURS PRN
Qty: 40 TABLET | Refills: 0 | Status: SHIPPED | OUTPATIENT
Start: 2023-11-20

## 2023-11-20 RX ORDER — ONDANSETRON 2 MG/ML
4 INJECTION INTRAMUSCULAR; INTRAVENOUS
Status: COMPLETED | OUTPATIENT
Start: 2023-11-20 | End: 2023-11-20

## 2023-11-20 RX ORDER — OSELTAMIVIR PHOSPHATE 75 MG/1
75 CAPSULE ORAL
Status: COMPLETED | OUTPATIENT
Start: 2023-11-20 | End: 2023-11-20

## 2023-11-20 RX ADMIN — IPRATROPIUM BROMIDE AND ALBUTEROL SULFATE 3 ML: 2.5; .5 SOLUTION RESPIRATORY (INHALATION) at 01:11

## 2023-11-20 RX ADMIN — SODIUM CHLORIDE 500 ML: 9 INJECTION, SOLUTION INTRAVENOUS at 01:11

## 2023-11-20 RX ADMIN — IBUPROFEN 800 MG: 400 TABLET ORAL at 01:11

## 2023-11-20 RX ADMIN — OSELTAMIVIR PHOSPHATE 75 MG: 75 CAPSULE ORAL at 03:11

## 2023-11-20 RX ADMIN — BENZONATATE 100 MG: 100 CAPSULE ORAL at 01:11

## 2023-11-20 RX ADMIN — ONDANSETRON 4 MG: 2 INJECTION INTRAMUSCULAR; INTRAVENOUS at 01:11

## 2023-11-20 NOTE — DISCHARGE INSTRUCTIONS
Recommend Tylenol or ibuprofen as needed for fever or body aches.    Recommend resting until fever free and symptoms improving.    Seek medical care for any confusion, lightheadedness, severe difficulty breathing, or other concerns.

## 2023-11-20 NOTE — ED PROVIDER NOTES
Encounter Date: 2023       History     Chief Complaint   Patient presents with    Weakness     PT with fatigue, cough and known exposure to the FLU.     68-year-old female presenting for fatigue, myalgias, cough.  Patient reports symptom onset earlier tonight.  Patient states nonproductive cough.  Reports severe generalized weakness.  Reports multiple members in the household sick with flu.  Reports associated nausea without vomiting.  Denies any chest pain.  No abdominal pain.  Patient also reports associated headache.      Review of patient's allergies indicates:   Allergen Reactions    Lisinopril Other (See Comments)     angioedema     Past Medical History:   Diagnosis Date    Asthma     Bronchitis     Hypertension      Past Surgical History:   Procedure Laterality Date    ANKLE SURGERY       SECTION      CHOLECYSTECTOMY       Family History   Problem Relation Age of Onset    Hypertension Mother     Stroke Mother      Social History     Tobacco Use    Smoking status: Never    Smokeless tobacco: Never   Substance Use Topics    Alcohol use: No    Drug use: No     Review of Systems   Constitutional:  Positive for chills and fatigue. Negative for fever.   HENT:  Positive for congestion and sore throat.    Respiratory:  Positive for cough and shortness of breath.    Cardiovascular:  Negative for chest pain.   Gastrointestinal:  Positive for diarrhea and nausea. Negative for abdominal pain and vomiting.   Genitourinary:  Negative for dysuria and frequency.   Musculoskeletal:  Negative for back pain and neck pain.   Skin:  Negative for rash.   Neurological:  Negative for weakness and headaches.   Psychiatric/Behavioral:  Negative for confusion.        Physical Exam     Initial Vitals [23 0040]   BP Pulse Resp Temp SpO2   (!) 202/91 (!) 120 20 98.9 °F (37.2 °C) 99 %      MAP       --         Physical Exam    Nursing note and vitals reviewed.  Constitutional: She appears well-developed and  well-nourished.  Non-toxic appearance. She does not appear ill. No distress.   Higher BMI.  Appears fatigued.   HENT:   Head: Normocephalic and atraumatic.   Mouth/Throat: Oropharynx is clear and moist.   Eyes: Conjunctivae and EOM are normal. Pupils are equal, round, and reactive to light. Right eye exhibits no nystagmus. Left eye exhibits no nystagmus.   Neck:   Normal range of motion.  Cardiovascular:  Regular rhythm and normal heart sounds.   Tachycardia present.         No murmur heard.  Pulmonary/Chest: No respiratory distress. She has wheezes in the right upper field and the left upper field.   Abdominal: Abdomen is soft. There is no abdominal tenderness.   Musculoskeletal:         General: No edema.      Cervical back: Normal range of motion.      Comments: No lower extremity edema.     Neurological: She is alert. GCS score is 15.   Skin: Skin is warm.   Psychiatric: She has a normal mood and affect.         ED Course   Procedures  Labs Reviewed   CBC W/ AUTO DIFFERENTIAL - Abnormal; Notable for the following components:       Result Value    MCHC 31.8 (*)     Lymph # 0.3 (*)     Gran % 90.2 (*)     Lymph % 3.8 (*)     All other components within normal limits   POCT INFLUENZA A/B MOLECULAR - Abnormal; Notable for the following components:    POC Molecular Influenza A Ag Positive (*)     All other components within normal limits   COMPREHENSIVE METABOLIC PANEL   TROPONIN I   B-TYPE NATRIURETIC PEPTIDE   SARS-COV-2 RDRP GENE          Imaging Results              X-Ray Chest 1 View (Final result)  Result time 11/20/23 02:15:11      Final result by Ramon Alvarado MD (11/20/23 02:15:11)                   Impression:      Diminished depth of inspiration, with accentuation of pulmonary bronchovascular markings without additional evidence for acute intrathoracic process.      Electronically signed by: Ramon Alvarado  Date:    11/20/2023  Time:    02:15               Narrative:    EXAMINATION:  XR CHEST 1  VIEW    CLINICAL HISTORY:  Cough, unspecified    TECHNIQUE:  Single frontal view of the chest was performed.    COMPARISON:  Chest radiograph April 1, 2020    FINDINGS:  Single chest view is submitted.  There is mild diminished depth of inspiration.  When accounting for position and technique and depth of inspiration, the cardiomediastinal silhouette appears stable.    There is a general pattern of accentuated attenuation consistent with soft tissue attenuation associated with body habitus.  Accentuation of pulmonary bronchovascular markings consistent with diminished depth of inspiration noted.    When accounting for the aforementioned there is no evidence for confluent infiltrate or consolidation, significant pleural effusion or pneumothorax.    The osseous structures appear intact.                                       Medications   sodium chloride 0.9% bolus 500 mL 500 mL (0 mLs Intravenous Stopped 11/20/23 0216)   benzonatate capsule 100 mg (100 mg Oral Given 11/20/23 0128)   ibuprofen tablet 800 mg (800 mg Oral Given 11/20/23 0132)   ondansetron injection 4 mg (4 mg Intravenous Given 11/20/23 0132)   albuterol-ipratropium 2.5 mg-0.5 mg/3 mL nebulizer solution 3 mL (3 mLs Nebulization Given 11/20/23 0145)   oseltamivir capsule 75 mg (75 mg Oral Given 11/20/23 0306)     Medical Decision Making    68-year-old female presenting with fatigue, myalgias, cough.  Patient found to be influenza positive.  Patient had mild expiratory wheeze.  Wheeze improved with DuoNeb.  No desaturation with ambulation.  Patient is started on Tamiflu given age and symptoms starting earlier this evening.  Discussed using NSAIDs or Tylenol as needed for fever or body aches.      Differential includes URI, lower respiratory tract infection, reactive airway disease.    Amount and/or Complexity of Data Reviewed  Labs: ordered. Decision-making details documented in ED Course.  Radiology: ordered.  ECG/medicine tests:  Decision-making details  documented in ED Course.    Risk  Prescription drug management.               ED Course as of 11/20/23 0418   Mon Nov 20, 2023   0128 POC Molecular Influenza A Ag(!): Positive [GEORGE]   0221 EKG 12-lead  Time 1:41 a.m.     Rate 114, sinus, regular rhythm, normal axis.   QRS 82 .  No ST elevation or depression no T-wave inversion or hyperacute T-waves.  No Q-waves present.  RSR lead 3.    Sinus tachycardia.   [JM]      ED Course User Index  [JM] Ochoa Lombardo MD                        Clinical Impression:  Final diagnoses:  [R07.9] Chest pain  [R05.9] Cough  [J11.1] Influenza (Primary)  [R53.83] Fatigue, unspecified type          ED Disposition Condition    Discharge Stable          ED Prescriptions       Medication Sig Dispense Start Date End Date Auth. Provider    oseltamivir (TAMIFLU) 75 MG capsule  (Status: Discontinued) Take 1 capsule (75 mg total) by mouth 2 (two) times daily. for 5 days 10 capsule 11/20/2023 11/20/2023 Ochoa Lombardo MD    ibuprofen (ADVIL,MOTRIN) 800 MG tablet  (Status: Discontinued) Take 1 tablet (800 mg total) by mouth every 6 (six) hours as needed for Pain. 40 tablet 11/20/2023 11/20/2023 Ochoa Lombardo MD    oseltamivir (TAMIFLU) 75 MG capsule Take 1 capsule (75 mg total) by mouth 2 (two) times daily. for 5 days 10 capsule 11/20/2023 11/25/2023 Ochoa Lombardo MD    ibuprofen (ADVIL,MOTRIN) 800 MG tablet Take 1 tablet (800 mg total) by mouth every 6 (six) hours as needed for Pain. 40 tablet 11/20/2023 -- Ochoa Lombardo MD          Follow-up Information       Follow up With Specialties Details Why Contact Info    Joao Wilcox Jr., MD Hospitalist, Family Medicine Schedule an appointment as soon as possible for a visit in 2 days Primary care [address]  [address]  [facility] [address]  [phone]      [facility] [place] Emergency Medicine  If symptoms worsen [address] [facility]  [facility] [address]  [phone]             Ochoa Lombardo MD  11/20/23 0411

## 2023-12-15 ENCOUNTER — HOSPITAL ENCOUNTER (EMERGENCY)
Facility: HOSPITAL | Age: 68
Discharge: HOME | End: 2023-12-15
Attending: STUDENT IN AN ORGANIZED HEALTH CARE EDUCATION/TRAINING PROGRAM
Payer: MEDICARE

## 2023-12-15 VITALS
WEIGHT: 290 LBS | HEIGHT: 66 IN | BODY MASS INDEX: 46.61 KG/M2 | DIASTOLIC BLOOD PRESSURE: 73 MMHG | HEART RATE: 85 BPM | TEMPERATURE: 98 F | SYSTOLIC BLOOD PRESSURE: 174 MMHG | RESPIRATION RATE: 24 BRPM | OXYGEN SATURATION: 98 %

## 2023-12-15 DIAGNOSIS — I10 HYPERTENSION: ICD-10-CM

## 2023-12-15 DIAGNOSIS — N39.0 URINARY TRACT INFECTION WITHOUT HEMATURIA, SITE UNSPECIFIED: ICD-10-CM

## 2023-12-15 DIAGNOSIS — H53.9 VISUAL DISTURBANCE: ICD-10-CM

## 2023-12-15 DIAGNOSIS — R53.1 WEAKNESS: Primary | ICD-10-CM

## 2023-12-15 LAB
ALBUMIN SERPL BCP-MCNC: 3.3 G/DL (ref 3.5–5.2)
ALP SERPL-CCNC: 93 U/L (ref 55–135)
ALT SERPL W/O P-5'-P-CCNC: 9 U/L (ref 10–44)
ANION GAP SERPL CALC-SCNC: 15 MMOL/L (ref 8–16)
AST SERPL-CCNC: 21 U/L (ref 10–40)
BACTERIA #/AREA URNS HPF: ABNORMAL /HPF
BASOPHILS # BLD AUTO: 0.01 K/UL (ref 0–0.2)
BASOPHILS NFR BLD: 0.1 % (ref 0–1.9)
BILIRUB SERPL-MCNC: 1.7 MG/DL (ref 0.1–1)
BILIRUB UR QL STRIP: NEGATIVE
BNP SERPL-MCNC: 54 PG/ML (ref 0–99)
BUN SERPL-MCNC: 9 MG/DL (ref 8–23)
CALCIUM SERPL-MCNC: 8.5 MG/DL (ref 8.7–10.5)
CHLORIDE SERPL-SCNC: 101 MMOL/L (ref 95–110)
CLARITY UR: ABNORMAL
CO2 SERPL-SCNC: 25 MMOL/L (ref 23–29)
COLOR UR: YELLOW
CREAT SERPL-MCNC: 1 MG/DL (ref 0.5–1.4)
CTP QC/QA: YES
CTP QC/QA: YES
DIFFERENTIAL METHOD: ABNORMAL
EOSINOPHIL # BLD AUTO: 0 K/UL (ref 0–0.5)
EOSINOPHIL NFR BLD: 0.1 % (ref 0–8)
ERYTHROCYTE [DISTWIDTH] IN BLOOD BY AUTOMATED COUNT: 13.4 % (ref 11.5–14.5)
EST. GFR  (NO RACE VARIABLE): >60 ML/MIN/1.73 M^2
GLUCOSE SERPL-MCNC: 93 MG/DL (ref 70–110)
GLUCOSE UR QL STRIP: NEGATIVE
HCT VFR BLD AUTO: 38.1 % (ref 37–48.5)
HGB BLD-MCNC: 12.6 G/DL (ref 12–16)
HGB UR QL STRIP: ABNORMAL
IMM GRANULOCYTES # BLD AUTO: 0.03 K/UL (ref 0–0.04)
IMM GRANULOCYTES NFR BLD AUTO: 0.4 % (ref 0–0.5)
KETONES UR QL STRIP: NEGATIVE
LACTATE SERPL-SCNC: 0.7 MMOL/L (ref 0.5–2.2)
LEUKOCYTE ESTERASE UR QL STRIP: ABNORMAL
LYMPHOCYTES # BLD AUTO: 1.1 K/UL (ref 1–4.8)
LYMPHOCYTES NFR BLD: 13.4 % (ref 18–48)
MCH RBC QN AUTO: 30.7 PG (ref 27–31)
MCHC RBC AUTO-ENTMCNC: 33.1 G/DL (ref 32–36)
MCV RBC AUTO: 93 FL (ref 82–98)
MICROSCOPIC COMMENT: ABNORMAL
MONOCYTES # BLD AUTO: 0.4 K/UL (ref 0.3–1)
MONOCYTES NFR BLD: 5.1 % (ref 4–15)
NEUTROPHILS # BLD AUTO: 6.3 K/UL (ref 1.8–7.7)
NEUTROPHILS NFR BLD: 80.9 % (ref 38–73)
NITRITE UR QL STRIP: NEGATIVE
NRBC BLD-RTO: 0 /100 WBC
PH UR STRIP: 7 [PH] (ref 5–8)
PLATELET # BLD AUTO: 215 K/UL (ref 150–450)
PMV BLD AUTO: 10.8 FL (ref 9.2–12.9)
POC MOLECULAR INFLUENZA A AGN: NEGATIVE
POC MOLECULAR INFLUENZA B AGN: NEGATIVE
POTASSIUM SERPL-SCNC: 3.2 MMOL/L (ref 3.5–5.1)
PROT SERPL-MCNC: 8 G/DL (ref 6–8.4)
PROT UR QL STRIP: ABNORMAL
RBC # BLD AUTO: 4.1 M/UL (ref 4–5.4)
RBC #/AREA URNS HPF: 21 /HPF (ref 0–4)
SARS-COV-2 RDRP RESP QL NAA+PROBE: NEGATIVE
SODIUM SERPL-SCNC: 141 MMOL/L (ref 136–145)
SP GR UR STRIP: 1.01 (ref 1–1.03)
TROPONIN I SERPL DL<=0.01 NG/ML-MCNC: 0.01 NG/ML (ref 0–0.03)
URN SPEC COLLECT METH UR: ABNORMAL
UROBILINOGEN UR STRIP-ACNC: NEGATIVE EU/DL
WBC # BLD AUTO: 7.82 K/UL (ref 3.9–12.7)
WBC #/AREA URNS HPF: >100 /HPF (ref 0–5)

## 2023-12-15 PROCEDURE — 25000003 PHARM REV CODE 250: Performed by: STUDENT IN AN ORGANIZED HEALTH CARE EDUCATION/TRAINING PROGRAM

## 2023-12-15 PROCEDURE — 81000 URINALYSIS NONAUTO W/SCOPE: CPT

## 2023-12-15 PROCEDURE — 99285 EMERGENCY DEPT VISIT HI MDM: CPT | Mod: 25

## 2023-12-15 PROCEDURE — 87086 URINE CULTURE/COLONY COUNT: CPT

## 2023-12-15 PROCEDURE — 96374 THER/PROPH/DIAG INJ IV PUSH: CPT

## 2023-12-15 PROCEDURE — 87040 BLOOD CULTURE FOR BACTERIA: CPT

## 2023-12-15 PROCEDURE — 63600175 PHARM REV CODE 636 W HCPCS: Performed by: STUDENT IN AN ORGANIZED HEALTH CARE EDUCATION/TRAINING PROGRAM

## 2023-12-15 PROCEDURE — 93010 EKG 12-LEAD: ICD-10-PCS | Mod: ,,, | Performed by: INTERNAL MEDICINE

## 2023-12-15 PROCEDURE — 93005 ELECTROCARDIOGRAM TRACING: CPT

## 2023-12-15 PROCEDURE — 93010 ELECTROCARDIOGRAM REPORT: CPT | Mod: ,,, | Performed by: INTERNAL MEDICINE

## 2023-12-15 PROCEDURE — 96361 HYDRATE IV INFUSION ADD-ON: CPT

## 2023-12-15 PROCEDURE — 87088 URINE BACTERIA CULTURE: CPT

## 2023-12-15 PROCEDURE — 80053 COMPREHEN METABOLIC PANEL: CPT

## 2023-12-15 PROCEDURE — 87077 CULTURE AEROBIC IDENTIFY: CPT

## 2023-12-15 PROCEDURE — 83605 ASSAY OF LACTIC ACID: CPT

## 2023-12-15 PROCEDURE — 87635 SARS-COV-2 COVID-19 AMP PRB: CPT

## 2023-12-15 PROCEDURE — 84484 ASSAY OF TROPONIN QUANT: CPT

## 2023-12-15 PROCEDURE — 83880 ASSAY OF NATRIURETIC PEPTIDE: CPT

## 2023-12-15 PROCEDURE — 87186 SC STD MICRODIL/AGAR DIL: CPT

## 2023-12-15 PROCEDURE — 87502 INFLUENZA DNA AMP PROBE: CPT

## 2023-12-15 PROCEDURE — 85025 COMPLETE CBC W/AUTO DIFF WBC: CPT

## 2023-12-15 RX ORDER — CEPHALEXIN 500 MG/1
500 CAPSULE ORAL EVERY 8 HOURS
Qty: 15 CAPSULE | Refills: 0 | Status: SHIPPED | OUTPATIENT
Start: 2023-12-15 | End: 2023-12-20

## 2023-12-15 RX ORDER — ONDANSETRON 4 MG/1
4 TABLET, FILM COATED ORAL EVERY 6 HOURS
Qty: 12 TABLET | Refills: 0 | Status: SHIPPED | OUTPATIENT
Start: 2023-12-15

## 2023-12-15 RX ORDER — ACETAMINOPHEN 500 MG
1000 TABLET ORAL
Status: COMPLETED | OUTPATIENT
Start: 2023-12-15 | End: 2023-12-15

## 2023-12-15 RX ORDER — HYDRALAZINE HYDROCHLORIDE 20 MG/ML
10 INJECTION INTRAMUSCULAR; INTRAVENOUS
Status: COMPLETED | OUTPATIENT
Start: 2023-12-15 | End: 2023-12-15

## 2023-12-15 RX ORDER — CEPHALEXIN 250 MG/1
500 CAPSULE ORAL
Status: COMPLETED | OUTPATIENT
Start: 2023-12-15 | End: 2023-12-15

## 2023-12-15 RX ADMIN — HYDRALAZINE HYDROCHLORIDE 10 MG: 20 INJECTION INTRAMUSCULAR; INTRAVENOUS at 07:12

## 2023-12-15 RX ADMIN — SODIUM CHLORIDE 1000 ML: 9 INJECTION, SOLUTION INTRAVENOUS at 06:12

## 2023-12-15 RX ADMIN — CEPHALEXIN 500 MG: 250 CAPSULE ORAL at 07:12

## 2023-12-15 RX ADMIN — ACETAMINOPHEN 1000 MG: 500 TABLET ORAL at 06:12

## 2023-12-16 NOTE — ED TRIAGE NOTES
Generalized Body Aches (Pt to ED c/o generalized all over body pain since yesterday. Reports having UTI symptoms (frequency and burning) last week and took AZO at home to treat. Denies fever but endorsed chills. BP in triage 232/105, reports taking BP meds this morning. )

## 2023-12-16 NOTE — DISCHARGE INSTRUCTIONS

## 2023-12-16 NOTE — ED PROVIDER NOTES
Encounter Date: 12/15/2023       History     Chief Complaint   Patient presents with    Generalized Body Aches     Pt to ED c/o generalized all over body pain since yesterday. Reports having UTI symptoms (frequency and burning) last week and took AZO at home to treat. Denies fever but endorsed chills. BP in triage 232/105, reports taking BP meds this morning.      68-year-old female presents for evaluation of generalized weakness, myalgias, decreased p.o. intake and ongoing headache for the past 2 days.  She had similar symptoms about 3 weeks ago and was diagnosed with the flu.  Symptoms improved and then they worsened again.  Her symptoms yesterday were somewhat improved with p.o. Tylenol.  She also had recent urinary symptoms, just prior to symptom onset of her generalized symptoms, including dysuria, urinary frequency, treated with over-the-counter azo and the urinary symptoms seemed to improve, but she is potentially concerned symptoms today or side effect of that medication..         Review of patient's allergies indicates:   Allergen Reactions    Lisinopril Other (See Comments)     angioedema     Past Medical History:   Diagnosis Date    Asthma     Bronchitis     Hypertension      Past Surgical History:   Procedure Laterality Date    ANKLE SURGERY       SECTION      CHOLECYSTECTOMY       Family History   Problem Relation Age of Onset    Hypertension Mother     Stroke Mother      Social History     Tobacco Use    Smoking status: Never    Smokeless tobacco: Never   Substance Use Topics    Alcohol use: No    Drug use: No     Review of Systems   Constitutional:  Positive for fatigue. Negative for activity change, appetite change, chills, fever and unexpected weight change.   HENT:  Negative for dental problem and drooling.    Eyes:  Negative for discharge and itching.   Respiratory:  Negative for cough, chest tightness, shortness of breath, wheezing and stridor.    Cardiovascular:  Negative for chest pain,  palpitations and leg swelling.   Gastrointestinal:  Negative for abdominal distention, abdominal pain, diarrhea and nausea.   Genitourinary:  Positive for dysuria. Negative for difficulty urinating, frequency and urgency.   Musculoskeletal:  Negative for back pain, gait problem and joint swelling.   Neurological:  Positive for weakness and headaches. Negative for dizziness, syncope and numbness.   Psychiatric/Behavioral:  Negative for agitation, behavioral problems and confusion.        Physical Exam     Initial Vitals [12/15/23 1746]   BP Pulse Resp Temp SpO2   (!) 232/105 98 20 98.5 °F (36.9 °C) 96 %      MAP       --         Physical Exam    Nursing note and vitals reviewed.  Constitutional: She appears well-developed and well-nourished. She is not diaphoretic.   HENT:   Head: Normocephalic and atraumatic.   Mouth/Throat: Oropharynx is clear and moist.   Eyes: EOM are normal. Pupils are equal, round, and reactive to light. Right eye exhibits no discharge. Left eye exhibits no discharge.   Neck: No tracheal deviation present.   Normal range of motion.  Cardiovascular:  Normal rate, regular rhythm and intact distal pulses.           Pulmonary/Chest: No respiratory distress. She has no wheezes. She exhibits no tenderness.   Abdominal: Abdomen is soft. She exhibits no distension. There is no abdominal tenderness.   Musculoskeletal:         General: No tenderness or edema. Normal range of motion.      Cervical back: Normal range of motion.     Neurological: She is alert and oriented to person, place, and time. She has normal strength. No cranial nerve deficit or sensory deficit. GCS eye subscore is 4. GCS verbal subscore is 5. GCS motor subscore is 6.   Skin: Skin is warm and dry. No rash noted.   Psychiatric: She has a normal mood and affect. Her behavior is normal. Thought content normal.         ED Course   Procedures  Labs Reviewed   CBC W/ AUTO DIFFERENTIAL - Abnormal; Notable for the following components:        Result Value    Gran % 80.9 (*)     Lymph % 13.4 (*)     All other components within normal limits   COMPREHENSIVE METABOLIC PANEL - Abnormal; Notable for the following components:    Potassium 3.2 (*)     Calcium 8.5 (*)     Albumin 3.3 (*)     Total Bilirubin 1.7 (*)     ALT 9 (*)     All other components within normal limits   URINALYSIS, REFLEX TO URINE CULTURE - Abnormal; Notable for the following components:    Appearance, UA Hazy (*)     Protein, UA Trace (*)     Occult Blood UA 1+ (*)     Leukocytes, UA 3+ (*)     All other components within normal limits    Narrative:     Specimen Source->Urine   URINALYSIS MICROSCOPIC - Abnormal; Notable for the following components:    RBC, UA 21 (*)     WBC, UA >100 (*)     All other components within normal limits    Narrative:     Specimen Source->Urine   CULTURE, BLOOD   CULTURE, URINE   LACTIC ACID, PLASMA   TROPONIN I   B-TYPE NATRIURETIC PEPTIDE   POCT INFLUENZA A/B MOLECULAR   SARS-COV-2 RDRP GENE          Imaging Results              X-Ray Chest AP Portable (Final result)  Result time 12/15/23 18:27:15      Final result by Erica Leon MD (12/15/23 18:27:15)                   Impression:      No acute cardiopulmonary process identified.      Electronically signed by: Erica Leon MD  Date:    12/15/2023  Time:    18:27               Narrative:    EXAMINATION:  XR CHEST AP PORTABLE    CLINICAL HISTORY:  hypertension;    TECHNIQUE:  Single frontal view of the chest was performed.    COMPARISON:  11/20/2023.    FINDINGS:  Cardiac silhouette is normal in size.  Lungs are symmetrically expanded.  No evidence of focal consolidative process, pneumothorax, or significant pleural effusion.  No acute osseous abnormality identified.                                       Medications   sodium chloride 0.9% bolus 1,000 mL 1,000 mL (0 mLs Intravenous Stopped 12/15/23 2039)   acetaminophen tablet 1,000 mg (1,000 mg Oral Given 12/15/23 1846)   cephALEXin capsule 500 mg  "(500 mg Oral Given 12/15/23 1957)   hydrALAZINE injection 10 mg (10 mg Intravenous Given 12/15/23 1957)     Medical Decision Making  Differential diagnosis includes urinary tract infection, viral illness, influenza, pneumonia, electrolyte abnormality, metabolic acidosis, uremia    Amount and/or Complexity of Data Reviewed  Labs:  Decision-making details documented in ED Course.    Risk  OTC drugs.  Prescription drug management.               ED Course as of 12/16/23 0145   Sat Dec 16, 2023   0030 On initial reassessment, patient complaining of "a payton in her left eye" patient otherwise alert oriented interactive, aware of her surroundings, answering questions appropriately.  She knows who I am, she knows my job, she knows where she is, she is aware that her son and daughter-in-law are at bedside.  She reports the "payton" moves when she moves her eye.  It is always in the lateral field of vision of the left eye.  She understood my reassurance that there was no bugs on her eye or in her eye. Description fits more with flashes and floaters.  These resolved soon after.  I advised close ophthalmology follow-up [BS]   0139 POCT Influenza A/B Molecular [BS]   0139 Urinalysis Microscopic(!) [BS]   0139 Urinalysis, Reflex to Urine Culture Urine, Clean Catch(!) [BS]   0139 POCT COVID-19 Rapid Screening [BS]   0139 Comprehensive metabolic panel(!) [BS]   0139 Troponin I [BS]   0139 CBC auto differential(!) [BS]   0139 Lactic acid, plasma #1 [BS]   0139 Brain natriuretic peptide [BS]   0140 Patient initially hypertensive, complaining of nonspecific and diffuse pain.  She is alert and oriented, uncomfortable but no acute distress.  Overall she is well-appearing and afebrile.  She is nontoxic appearing.  CBC without leukocytosis or anemia.  Lactic within normal limits, doubt sepsis.  BNP within normal limits, doubt heart failure.  CMP notable for mild elevation in T bili, unclear significance, also notable for mild decrease in " potassium.  Flu and COVID test negative.  UA consistent with urinary tract infection, will treat with Keflex.  Patient given 1 L IV fluids and Tylenol with improvement in symptoms along with hydralazine for her blood pressure which came down to 174/73 from 232/105 on arrival.  Reassessment, patient is overall well-appearing feeling well and would like to go home.  She is tolerating p.o. intake.  She ambulated without difficulty.  She is stable for discharge with outpatient follow-up and return precautions [BS]      ED Course User Index  [BS] Tao Hyatt MD                           Clinical Impression:  Final diagnoses:  [I10] Hypertension  [R53.1] Weakness (Primary)  [H53.9] Visual disturbance  [N39.0] Urinary tract infection without hematuria, site unspecified          ED Disposition Condition    Discharge Stable          ED Prescriptions       Medication Sig Dispense Start Date End Date Auth. Provider    cephALEXin (KEFLEX) 500 MG capsule Take 1 capsule (500 mg total) by mouth every 8 (eight) hours. for 5 days 15 capsule 12/15/2023 12/20/2023 Tao Hyatt MD    ondansetron (ZOFRAN) 4 MG tablet Take 1 tablet (4 mg total) by mouth every 6 (six) hours. 12 tablet 12/15/2023 -- Tao Hyatt MD          Follow-up Information       Follow up With Specialties Details Why Contact Info    Joao Wilcox Jr., MD Hospitalist, Family Medicine Call on 12/18/2023 To set up a follow-up appointment, To recheck today's symptoms [address]  [address]  [facility] [id number] [phone]               Tao Hyatt MD  12/16/23 0141       Tao Hyatt MD  12/16/23 0145

## 2023-12-17 LAB — BACTERIA UR CULT: ABNORMAL

## 2023-12-19 LAB — BACTERIA BLD CULT: NORMAL

## 2024-11-29 ENCOUNTER — HOSPITAL ENCOUNTER (EMERGENCY)
Facility: HOSPITAL | Age: 69
Discharge: HOME | End: 2024-11-29
Attending: EMERGENCY MEDICINE
Payer: MEDICARE

## 2024-11-29 VITALS
OXYGEN SATURATION: 97 % | HEART RATE: 73 BPM | SYSTOLIC BLOOD PRESSURE: 182 MMHG | DIASTOLIC BLOOD PRESSURE: 78 MMHG | RESPIRATION RATE: 18 BRPM | TEMPERATURE: 99 F | BODY MASS INDEX: 46.65 KG/M2 | WEIGHT: 289 LBS

## 2024-11-29 DIAGNOSIS — R11.2 NAUSEA AND VOMITING: ICD-10-CM

## 2024-11-29 DIAGNOSIS — K52.9 GASTROENTERITIS: Primary | ICD-10-CM

## 2024-11-29 LAB
ALBUMIN SERPL BCP-MCNC: 3.3 G/DL (ref 3.5–5.2)
ALP SERPL-CCNC: 95 U/L (ref 40–150)
ALT SERPL W/O P-5'-P-CCNC: 13 U/L (ref 10–44)
ANION GAP SERPL CALC-SCNC: 10 MMOL/L (ref 8–16)
AST SERPL-CCNC: 25 U/L (ref 10–40)
BASOPHILS # BLD AUTO: 0.01 K/UL (ref 0–0.2)
BASOPHILS NFR BLD: 0.1 % (ref 0–1.9)
BILIRUB SERPL-MCNC: 1.2 MG/DL (ref 0.1–1)
BUN SERPL-MCNC: 10 MG/DL (ref 8–23)
CALCIUM SERPL-MCNC: 9 MG/DL (ref 8.7–10.5)
CHLORIDE SERPL-SCNC: 105 MMOL/L (ref 95–110)
CO2 SERPL-SCNC: 27 MMOL/L (ref 23–29)
CREAT SERPL-MCNC: 0.9 MG/DL (ref 0.5–1.4)
DIFFERENTIAL METHOD BLD: ABNORMAL
EOSINOPHIL # BLD AUTO: 0 K/UL (ref 0–0.5)
EOSINOPHIL NFR BLD: 0.1 % (ref 0–8)
ERYTHROCYTE [DISTWIDTH] IN BLOOD BY AUTOMATED COUNT: 12.7 % (ref 11.5–14.5)
EST. GFR  (NO RACE VARIABLE): >60 ML/MIN/1.73 M^2
GLUCOSE SERPL-MCNC: 100 MG/DL (ref 70–110)
HCT VFR BLD AUTO: 40.7 % (ref 37–48.5)
HGB BLD-MCNC: 13.5 G/DL (ref 12–16)
IMM GRANULOCYTES # BLD AUTO: 0.02 K/UL (ref 0–0.04)
IMM GRANULOCYTES NFR BLD AUTO: 0.2 % (ref 0–0.5)
LIPASE SERPL-CCNC: 19 U/L (ref 4–60)
LYMPHOCYTES # BLD AUTO: 1.1 K/UL (ref 1–4.8)
LYMPHOCYTES NFR BLD: 13.9 % (ref 18–48)
MAGNESIUM SERPL-MCNC: 1.8 MG/DL (ref 1.6–2.6)
MCH RBC QN AUTO: 31.8 PG (ref 27–31)
MCHC RBC AUTO-ENTMCNC: 33.2 G/DL (ref 32–36)
MCV RBC AUTO: 96 FL (ref 82–98)
MONOCYTES # BLD AUTO: 0.3 K/UL (ref 0.3–1)
MONOCYTES NFR BLD: 3.9 % (ref 4–15)
NEUTROPHILS # BLD AUTO: 6.6 K/UL (ref 1.8–7.7)
NEUTROPHILS NFR BLD: 81.8 % (ref 38–73)
NRBC BLD-RTO: 0 /100 WBC
PLATELET # BLD AUTO: 246 K/UL (ref 150–450)
PMV BLD AUTO: 11.5 FL (ref 9.2–12.9)
POTASSIUM SERPL-SCNC: 3.9 MMOL/L (ref 3.5–5.1)
PROT SERPL-MCNC: 7.9 G/DL (ref 6–8.4)
RBC # BLD AUTO: 4.24 M/UL (ref 4–5.4)
SODIUM SERPL-SCNC: 142 MMOL/L (ref 136–145)
WBC # BLD AUTO: 8.01 K/UL (ref 3.9–12.7)

## 2024-11-29 PROCEDURE — 93005 ELECTROCARDIOGRAM TRACING: CPT

## 2024-11-29 PROCEDURE — 85025 COMPLETE CBC W/AUTO DIFF WBC: CPT | Performed by: EMERGENCY MEDICINE

## 2024-11-29 PROCEDURE — 83735 ASSAY OF MAGNESIUM: CPT | Performed by: EMERGENCY MEDICINE

## 2024-11-29 PROCEDURE — 99284 EMERGENCY DEPT VISIT MOD MDM: CPT | Mod: 25

## 2024-11-29 PROCEDURE — 96374 THER/PROPH/DIAG INJ IV PUSH: CPT

## 2024-11-29 PROCEDURE — 25000003 PHARM REV CODE 250: Performed by: EMERGENCY MEDICINE

## 2024-11-29 PROCEDURE — 93010 ELECTROCARDIOGRAM REPORT: CPT | Mod: ,,, | Performed by: INTERNAL MEDICINE

## 2024-11-29 PROCEDURE — 83690 ASSAY OF LIPASE: CPT | Performed by: EMERGENCY MEDICINE

## 2024-11-29 PROCEDURE — 96361 HYDRATE IV INFUSION ADD-ON: CPT

## 2024-11-29 PROCEDURE — 63600175 PHARM REV CODE 636 W HCPCS: Performed by: EMERGENCY MEDICINE

## 2024-11-29 PROCEDURE — 80053 COMPREHEN METABOLIC PANEL: CPT | Performed by: EMERGENCY MEDICINE

## 2024-11-29 RX ORDER — ONDANSETRON 4 MG/1
4 TABLET, FILM COATED ORAL EVERY 6 HOURS PRN
Qty: 12 TABLET | Refills: 0 | Status: SHIPPED | OUTPATIENT
Start: 2024-11-29 | End: 2024-12-02

## 2024-11-29 RX ORDER — ONDANSETRON HYDROCHLORIDE 2 MG/ML
4 INJECTION, SOLUTION INTRAVENOUS
Status: COMPLETED | OUTPATIENT
Start: 2024-11-29 | End: 2024-11-29

## 2024-11-29 RX ORDER — DICYCLOMINE HYDROCHLORIDE 20 MG/1
20 TABLET ORAL 2 TIMES DAILY PRN
Qty: 10 TABLET | Refills: 0 | Status: SHIPPED | OUTPATIENT
Start: 2024-11-29 | End: 2024-12-04

## 2024-11-29 RX ADMIN — ONDANSETRON 4 MG: 2 INJECTION INTRAMUSCULAR; INTRAVENOUS at 01:11

## 2024-11-29 RX ADMIN — SODIUM CHLORIDE 1000 ML: 9 INJECTION, SOLUTION INTRAVENOUS at 01:11

## 2024-11-29 NOTE — DISCHARGE INSTRUCTIONS
You were seen in the emergency department for nausea, vomiting, and diarrhea.  We gave you some medication and you felt better.  We think you're safe to go home.  Please follow-up with your primary care provider in the next day or two.  Please return for any new or worsening fever, seizures, inability to eat or drink anything, black or bloody stool, or become concerned about her child in any other way.

## 2024-11-29 NOTE — ED PROVIDER NOTES
Encounter Date: 2024       History     Chief Complaint   Patient presents with    N/V/D     Since around noon today     69-year-old female presenting with nausea vomiting and diarrhea that started earlier today.  Patient notes she has vomited at least 10 times.  Denies fevers, chills, chest pain, shortness of breath.  Notes abdominal discomfort when she is nauseous.  Notes family member with similar though less extensive symptoms.  Denies black or bloody stool.  Denies recent travel, bad foods, or other exposures.      Review of patient's allergies indicates:   Allergen Reactions    Lisinopril Other (See Comments)     angioedema     Past Medical History:   Diagnosis Date    Asthma     Bronchitis     Hypertension      Past Surgical History:   Procedure Laterality Date    ANKLE SURGERY       SECTION      CHOLECYSTECTOMY       Family History   Problem Relation Name Age of Onset    Hypertension Mother      Stroke Mother       Social History     Tobacco Use    Smoking status: Never    Smokeless tobacco: Never   Substance Use Topics    Alcohol use: No    Drug use: No     Review of Systems   Constitutional:  Negative for fever.   Respiratory:  Negative for chest tightness.    Gastrointestinal:  Positive for diarrhea, nausea and vomiting. Negative for abdominal pain and blood in stool.       Physical Exam     Initial Vitals [24 0029]   BP Pulse Resp Temp SpO2   (!) 192/96 101 20 98.5 °F (36.9 °C) 97 %      MAP       --         Physical Exam    Nursing note and vitals reviewed.  Constitutional: She appears well-developed and well-nourished. She is not diaphoretic. No distress.   HENT:   Head: Normocephalic and atraumatic.   Nose: Nose normal.   Eyes: EOM are normal. Pupils are equal, round, and reactive to light. No scleral icterus.   Neck: Neck supple.   Normal range of motion.  Cardiovascular:  Normal rate, regular rhythm, normal heart sounds and intact distal pulses.     Exam reveals no gallop and no  friction rub.       No murmur heard.  Pulmonary/Chest: Breath sounds normal. No stridor. No respiratory distress. She has no wheezes. She has no rhonchi. She has no rales.   Abdominal: Abdomen is soft. Bowel sounds are normal. She exhibits no distension. There is no abdominal tenderness. There is no rebound and no guarding.   Musculoskeletal:         General: No tenderness or edema. Normal range of motion.      Cervical back: Normal range of motion and neck supple.     Neurological: She is alert and oriented to person, place, and time. No cranial nerve deficit. GCS score is 15. GCS eye subscore is 4. GCS verbal subscore is 5. GCS motor subscore is 6.   Skin: Skin is warm and dry. No rash noted.   Psychiatric: She has a normal mood and affect. Her behavior is normal.         ED Course   Procedures  Labs Reviewed   CBC W/ AUTO DIFFERENTIAL - Abnormal       Result Value    WBC 8.01      RBC 4.24      Hemoglobin 13.5      Hematocrit 40.7      MCV 96      MCH 31.8 (*)     MCHC 33.2      RDW 12.7      Platelets 246      MPV 11.5      Immature Granulocytes 0.2      Gran # (ANC) 6.6      Immature Grans (Abs) 0.02      Lymph # 1.1      Mono # 0.3      Eos # 0.0      Baso # 0.01      nRBC 0      Gran % 81.8 (*)     Lymph % 13.9 (*)     Mono % 3.9 (*)     Eosinophil % 0.1      Basophil % 0.1      Differential Method Automated     COMPREHENSIVE METABOLIC PANEL - Abnormal    Sodium 142      Potassium 3.9      Chloride 105      CO2 27      Glucose 100      BUN 10      Creatinine 0.9      Calcium 9.0      Total Protein 7.9      Albumin 3.3 (*)     Total Bilirubin 1.2 (*)     Alkaline Phosphatase 95      AST 25      ALT 13      eGFR >60      Anion Gap 10     LIPASE    Lipase 19     MAGNESIUM    Magnesium 1.8       EKG Readings: (Independently Interpreted)   Initial Reading: No STEMI. Rhythm: Normal Sinus Rhythm. Heart Rate: 89.   No ST segment elevation or depression concerning for acute ischemia.        ECG Results               EKG 12-lead (Final result)  Result time 11/29/24 11:13:18      Final result by Unknown User (11/29/24 11:13:18)                                      Imaging Results    None          Medications   sodium chloride 0.9% bolus 1,000 mL 1,000 mL (0 mLs Intravenous Stopped 11/29/24 0300)   ondansetron injection 4 mg (4 mg Intravenous Given 11/29/24 0127)     Medical Decision Making             ED Course as of 11/29/24 2354   Fri Nov 29, 2024   0301 Patient tolerating p.o. and feeling better.  Will discharge [BS]      ED Course User Index  [BS] Tao Hyatt MD               Medical Decision Making:   Initial Assessment:   69-year-old female presenting with nausea vomiting and diarrhea.  Someone else in her family has similar symptoms.  On exam she is well-appearing and in no acute distress.  Vitals relatively normal with mild hypertension noted.  No abdominal pain or tenderness.  Abdominal exam benign.  Suspect gastroenteritis or similar.  Doubt obstruction, biliary cause, pancreatitis.  Will get labs, give IV fluids, Zofran, reassess.  ED Management:  Labs reassuring.  Exam reassuring.  Patient given Zofran and Bentyl.  Believe she is safe for discharge home.  Discussed return precautions.  Patient amenable with plan.             Clinical Impression:  Final diagnoses:  [R11.2] Nausea and vomiting  [K52.9] Gastroenteritis (Primary)          ED Disposition Condition    Discharge Stable          ED Prescriptions       Medication Sig Dispense Start Date End Date Auth. Provider    ondansetron (ZOFRAN) 4 MG tablet Take 1 tablet (4 mg total) by mouth every 6 (six) hours as needed for Nausea. 12 tablet 11/29/2024 12/2/2024 Morales Gibbs MD    dicyclomine (BENTYL) 20 mg tablet Take 1 tablet (20 mg total) by mouth 2 (two) times daily as needed (Abdominal cramping). 10 tablet 11/29/2024 12/4/2024 Morales Gibbs MD          Follow-up Information       Follow up With Specialties Details Why Contact Info     Joao Wilcox Jr., MD Hospitalist, Family Medicine Schedule an appointment as soon as possible for a visit   [address] [facility]  [address]  [facility] [address]  [phone]      [facility] - Emergency Dept Emergency Medicine  As needed, If symptoms worsen [address] [facility] [place] [address]  [phone]             Morales Gibbs MD  11/29/24 3012

## 2024-11-29 NOTE — ED TRIAGE NOTES
Pt presents to the ED with complaints of nausea, vomiting and diarrhea since noon. Pt report countless episode of both vomiting and diarrhea. Pt states last oral intake was 11/27 and 10 am. Pt also reports mild SOB and chest spasms when using the restroom. Pt AAOx4 with daughter at bedside.

## 2024-12-01 LAB
OHS QRS DURATION: 100 MS
OHS QTC CALCULATION: 433 MS

## 2024-12-19 ENCOUNTER — HOSPITAL ENCOUNTER (EMERGENCY)
Facility: HOSPITAL | Age: 69
Discharge: HOME | End: 2024-12-19
Attending: EMERGENCY MEDICINE
Payer: MEDICARE

## 2024-12-19 VITALS
RESPIRATION RATE: 18 BRPM | HEIGHT: 66 IN | OXYGEN SATURATION: 98 % | SYSTOLIC BLOOD PRESSURE: 179 MMHG | HEART RATE: 78 BPM | BODY MASS INDEX: 46.12 KG/M2 | TEMPERATURE: 98 F | DIASTOLIC BLOOD PRESSURE: 71 MMHG | WEIGHT: 287 LBS

## 2024-12-19 DIAGNOSIS — R10.9 FLANK PAIN: ICD-10-CM

## 2024-12-19 DIAGNOSIS — K57.90 DIVERTICULOSIS: Primary | ICD-10-CM

## 2024-12-19 LAB
ALBUMIN SERPL BCP-MCNC: 3.4 G/DL (ref 3.5–5.2)
ALP SERPL-CCNC: 98 U/L (ref 40–150)
ALT SERPL W/O P-5'-P-CCNC: 11 U/L (ref 10–44)
ANION GAP SERPL CALC-SCNC: 8 MMOL/L (ref 8–16)
AST SERPL-CCNC: 17 U/L (ref 10–40)
BASOPHILS # BLD AUTO: 0.03 K/UL (ref 0–0.2)
BASOPHILS NFR BLD: 0.4 % (ref 0–1.9)
BILIRUB SERPL-MCNC: 1 MG/DL (ref 0.1–1)
BILIRUB UR QL STRIP: NEGATIVE
BUN SERPL-MCNC: 12 MG/DL (ref 8–23)
CALCIUM SERPL-MCNC: 8.7 MG/DL (ref 8.7–10.5)
CHLORIDE SERPL-SCNC: 109 MMOL/L (ref 95–110)
CLARITY UR: CLEAR
CO2 SERPL-SCNC: 24 MMOL/L (ref 23–29)
COLOR UR: YELLOW
CREAT SERPL-MCNC: 0.8 MG/DL (ref 0.5–1.4)
DIFFERENTIAL METHOD BLD: ABNORMAL
EOSINOPHIL # BLD AUTO: 0.1 K/UL (ref 0–0.5)
EOSINOPHIL NFR BLD: 1.6 % (ref 0–8)
ERYTHROCYTE [DISTWIDTH] IN BLOOD BY AUTOMATED COUNT: 12.4 % (ref 11.5–14.5)
EST. GFR  (NO RACE VARIABLE): >60 ML/MIN/1.73 M^2
GLUCOSE SERPL-MCNC: 92 MG/DL (ref 70–110)
GLUCOSE UR QL STRIP: NEGATIVE
HCT VFR BLD AUTO: 36.3 % (ref 37–48.5)
HGB BLD-MCNC: 11.8 G/DL (ref 12–16)
HGB UR QL STRIP: NEGATIVE
IMM GRANULOCYTES # BLD AUTO: 0.03 K/UL (ref 0–0.04)
IMM GRANULOCYTES NFR BLD AUTO: 0.4 % (ref 0–0.5)
KETONES UR QL STRIP: NEGATIVE
LEUKOCYTE ESTERASE UR QL STRIP: NEGATIVE
LYMPHOCYTES # BLD AUTO: 2 K/UL (ref 1–4.8)
LYMPHOCYTES NFR BLD: 26.8 % (ref 18–48)
MCH RBC QN AUTO: 31.3 PG (ref 27–31)
MCHC RBC AUTO-ENTMCNC: 32.5 G/DL (ref 32–36)
MCV RBC AUTO: 96 FL (ref 82–98)
MONOCYTES # BLD AUTO: 0.3 K/UL (ref 0.3–1)
MONOCYTES NFR BLD: 3.7 % (ref 4–15)
NEUTROPHILS # BLD AUTO: 5.1 K/UL (ref 1.8–7.7)
NEUTROPHILS NFR BLD: 67.1 % (ref 38–73)
NITRITE UR QL STRIP: NEGATIVE
NRBC BLD-RTO: 0 /100 WBC
PH UR STRIP: 7 [PH] (ref 5–8)
PLATELET # BLD AUTO: 254 K/UL (ref 150–450)
PMV BLD AUTO: 11.1 FL (ref 9.2–12.9)
POTASSIUM SERPL-SCNC: 3.7 MMOL/L (ref 3.5–5.1)
PROT SERPL-MCNC: 7.6 G/DL (ref 6–8.4)
PROT UR QL STRIP: NEGATIVE
RBC # BLD AUTO: 3.77 M/UL (ref 4–5.4)
SODIUM SERPL-SCNC: 141 MMOL/L (ref 136–145)
SP GR UR STRIP: 1.01 (ref 1–1.03)
URN SPEC COLLECT METH UR: NORMAL
UROBILINOGEN UR STRIP-ACNC: NEGATIVE EU/DL
WBC # BLD AUTO: 7.61 K/UL (ref 3.9–12.7)

## 2024-12-19 PROCEDURE — 85025 COMPLETE CBC W/AUTO DIFF WBC: CPT | Performed by: EMERGENCY MEDICINE

## 2024-12-19 PROCEDURE — 63600175 PHARM REV CODE 636 W HCPCS: Performed by: EMERGENCY MEDICINE

## 2024-12-19 PROCEDURE — 25000003 PHARM REV CODE 250: Performed by: EMERGENCY MEDICINE

## 2024-12-19 PROCEDURE — 80053 COMPREHEN METABOLIC PANEL: CPT | Performed by: EMERGENCY MEDICINE

## 2024-12-19 PROCEDURE — 96361 HYDRATE IV INFUSION ADD-ON: CPT

## 2024-12-19 PROCEDURE — 81003 URINALYSIS AUTO W/O SCOPE: CPT

## 2024-12-19 PROCEDURE — 96375 TX/PRO/DX INJ NEW DRUG ADDON: CPT

## 2024-12-19 PROCEDURE — 96374 THER/PROPH/DIAG INJ IV PUSH: CPT

## 2024-12-19 PROCEDURE — 99285 EMERGENCY DEPT VISIT HI MDM: CPT | Mod: 25

## 2024-12-19 RX ORDER — ONDANSETRON HYDROCHLORIDE 2 MG/ML
4 INJECTION, SOLUTION INTRAVENOUS
Status: COMPLETED | OUTPATIENT
Start: 2024-12-19 | End: 2024-12-19

## 2024-12-19 RX ORDER — KETOROLAC TROMETHAMINE 30 MG/ML
15 INJECTION, SOLUTION INTRAMUSCULAR; INTRAVENOUS
Status: COMPLETED | OUTPATIENT
Start: 2024-12-19 | End: 2024-12-19

## 2024-12-19 RX ORDER — METRONIDAZOLE 500 MG/1
500 TABLET ORAL 3 TIMES DAILY
Qty: 21 TABLET | Refills: 0 | Status: SHIPPED | OUTPATIENT
Start: 2024-12-19 | End: 2024-12-26

## 2024-12-19 RX ORDER — CLONIDINE HYDROCHLORIDE 0.1 MG/1
0.1 TABLET ORAL
Status: COMPLETED | OUTPATIENT
Start: 2024-12-19 | End: 2024-12-19

## 2024-12-19 RX ORDER — HYDROMORPHONE HYDROCHLORIDE 1 MG/ML
1 INJECTION, SOLUTION INTRAMUSCULAR; INTRAVENOUS; SUBCUTANEOUS
Status: COMPLETED | OUTPATIENT
Start: 2024-12-19 | End: 2024-12-19

## 2024-12-19 RX ORDER — HYDROCODONE BITARTRATE AND ACETAMINOPHEN 5; 325 MG/1; MG/1
1 TABLET ORAL EVERY 4 HOURS PRN
Qty: 18 TABLET | Refills: 0 | Status: SHIPPED | OUTPATIENT
Start: 2024-12-19

## 2024-12-19 RX ORDER — ONDANSETRON 4 MG/1
4 TABLET, ORALLY DISINTEGRATING ORAL EVERY 6 HOURS PRN
Qty: 10 TABLET | Refills: 0 | Status: SHIPPED | OUTPATIENT
Start: 2024-12-19

## 2024-12-19 RX ADMIN — CLONIDINE HYDROCHLORIDE 0.1 MG: 0.1 TABLET ORAL at 05:12

## 2024-12-19 RX ADMIN — KETOROLAC TROMETHAMINE 15 MG: 30 INJECTION, SOLUTION INTRAMUSCULAR; INTRAVENOUS at 03:12

## 2024-12-19 RX ADMIN — SODIUM CHLORIDE, SODIUM LACTATE, POTASSIUM CHLORIDE, AND CALCIUM CHLORIDE 1000 ML: .6; .31; .03; .02 INJECTION, SOLUTION INTRAVENOUS at 03:12

## 2024-12-19 RX ADMIN — ONDANSETRON 4 MG: 2 INJECTION INTRAMUSCULAR; INTRAVENOUS at 03:12

## 2024-12-19 RX ADMIN — HYDROMORPHONE HYDROCHLORIDE 1 MG: 1 INJECTION, SOLUTION INTRAMUSCULAR; INTRAVENOUS; SUBCUTANEOUS at 04:12

## 2024-12-19 NOTE — ED PROVIDER NOTES
Encounter Date: 2024    SCRIBE #1 NOTE: I, Karl Dania, am scribing for, and in the presence of,  Arvin Hoyos MD.       History     Chief Complaint   Patient presents with    Flank Pain     Patient reports left flank pain that started yesterday. It stopped yesterday but then started again. Denies any urinary complaints. Denies any GI complaints. AAOx3     69 y.o. female with PMHx of HTN, presents to the ED for evaluation of R flank pain that radiates to the abdomen x yesterday. Patient reports that symptoms have been intermittent. Denies a hx of kindey stones. No medications taken for symptoms. Denies any urinary symptoms. Patient has no other complaints at this time.     The history is provided by the patient. No  was used.     Review of patient's allergies indicates:   Allergen Reactions    Lisinopril Other (See Comments)     angioedema     Past Medical History:   Diagnosis Date    Asthma     Bronchitis     Hypertension      Past Surgical History:   Procedure Laterality Date    ANKLE SURGERY       SECTION      CHOLECYSTECTOMY       Family History   Problem Relation Name Age of Onset    Hypertension Mother      Stroke Mother       Social History     Tobacco Use    Smoking status: Never    Smokeless tobacco: Never   Substance Use Topics    Alcohol use: No    Drug use: No     Review of Systems   Constitutional: Negative.    HENT: Negative.     Eyes: Negative.    Respiratory: Negative.     Cardiovascular: Negative.    Gastrointestinal: Negative.    Genitourinary:  Positive for flank pain.   Skin: Negative.    Neurological: Negative.    All other systems reviewed and are negative.      Physical Exam     Initial Vitals [24 1408]   BP Pulse Resp Temp SpO2   (!) 197/87 80 16 97.7 °F (36.5 °C) 99 %      MAP       --         Physical Exam    Nursing note and vitals reviewed.  Constitutional: She appears well-developed and well-nourished.   HENT:   Head: Normocephalic and  atraumatic.   Eyes: Conjunctivae and EOM are normal. Pupils are equal, round, and reactive to light.   Neck: Neck supple. No thyroid mass present.   Cardiovascular:  Normal rate, regular rhythm, S1 normal, S2 normal, normal heart sounds and intact distal pulses.           Pulmonary/Chest: Effort normal and breath sounds normal. No respiratory distress.   Abdominal: Abdomen is soft. Bowel sounds are normal.   Subjective right CVAT radiating to the right flank. Pain is not reproducible.    Musculoskeletal:         General: No tenderness. Normal range of motion.      Cervical back: Neck supple.     Lymphadenopathy:     She has no axillary adenopathy.   Neurological: She is alert and oriented to person, place, and time. GCS eye subscore is 4. GCS verbal subscore is 5. GCS motor subscore is 6.   Skin: Skin is warm, dry and intact.   Psychiatric: She has a normal mood and affect. Her speech is normal. Judgment normal. Cognition and memory are normal.         ED Course   Procedures  Labs Reviewed   CBC W/ AUTO DIFFERENTIAL - Abnormal       Result Value    WBC 7.61      RBC 3.77 (*)     Hemoglobin 11.8 (*)     Hematocrit 36.3 (*)     MCV 96      MCH 31.3 (*)     MCHC 32.5      RDW 12.4      Platelets 254      MPV 11.1      Immature Granulocytes 0.4      Gran # (ANC) 5.1      Immature Grans (Abs) 0.03      Lymph # 2.0      Mono # 0.3      Eos # 0.1      Baso # 0.03      nRBC 0      Gran % 67.1      Lymph % 26.8      Mono % 3.7 (*)     Eosinophil % 1.6      Basophil % 0.4      Differential Method Automated     COMPREHENSIVE METABOLIC PANEL - Abnormal    Sodium 141      Potassium 3.7      Chloride 109      CO2 24      Glucose 92      BUN 12      Creatinine 0.8      Calcium 8.7      Total Protein 7.6      Albumin 3.4 (*)     Total Bilirubin 1.0      Alkaline Phosphatase 98      AST 17      ALT 11      eGFR >60      Anion Gap 8     URINALYSIS, REFLEX TO URINE CULTURE    Specimen UA Urine, Clean Catch      Color, UA Yellow       Appearance, UA Clear      pH, UA 7.0      Specific Gravity, UA 1.015      Protein, UA Negative      Glucose, UA Negative      Ketones, UA Negative      Bilirubin (UA) Negative      Occult Blood UA Negative      Nitrite, UA Negative      Urobilinogen, UA Negative      Leukocytes, UA Negative      Narrative:     Specimen Source->Urine          Imaging Results              CT Renal Stone Study ABD Pelvis WO (Final result)  Result time 12/19/24 16:35:46      Final result by Omid Arora MD (12/19/24 16:35:46)                   Impression:      1. No findings to suggest obstructive uropathy.  2. Leiomyomatous uterus.  3. Please see above for additional findings.      Electronically signed by: Omid Arora MD  Date:    12/19/2024  Time:    16:35               Narrative:    EXAMINATION:  CT RENAL STONE STUDY ABD PELVIS WO    CLINICAL HISTORY:  Flank pain, kidney stone suspected;    TECHNIQUE:  Low dose axial images, sagittal and coronal reformations were obtained from the lung bases to the pubic symphysis.  Contrast was not administered.    COMPARISON:  04/28/2021    FINDINGS:  Images of the lower thorax are remarkable for bilateral dependent atelectasis.  There are a few scattered tree-in-bud type nodules along the periphery of the left lower lobe.    The liver, spleen, pancreas and adrenal glands have a grossly unremarkable noncontrast appearance.  The gallbladder is surgically absent, no significant biliary dilation status post cholecystectomy.  The stomach is decompressed without wall thickening.  No significant abdominal lymphadenopathy.    There is atrophic change of the right kidney.  No hydronephrosis or nephrolithiasis.  The bilateral ureters are unremarkable without calculi seen.  The urinary bladder is unremarkable.  There are multiple uterine leiomyoma.  No significant free fluid in the pelvis.  The adnexa is unremarkable.    There are a few scattered colonic diverticula without inflammation to  suggest diverticulitis.  The terminal ileum is unremarkable.  The appendix is unremarkable.  The small bowel is grossly unremarkable.  There are a few scattered shotty periaortic, pericaval, and mesenteric lymph nodes.  There are a few scattered mildly prominent pericecal lymph nodes.  No focal organized pelvic fluid collection.    There are degenerative changes of the pubic symphysis, sacroiliac joints and spine.  No significant inguinal lymphadenopathy.                                       Medications   lactated ringers bolus 1,000 mL (0 mLs Intravenous Stopped 12/19/24 1656)   ketorolac injection 15 mg (15 mg Intravenous Given 12/19/24 1545)   ondansetron injection 4 mg (4 mg Intravenous Given 12/19/24 1542)   HYDROmorphone injection 1 mg (1 mg Intravenous Given 12/19/24 1640)   cloNIDine tablet 0.1 mg (0.1 mg Oral Given 12/19/24 1740)     Medical Decision Making  This patient presented to the emergency department with right flank right CVA pain that lasted for a brief period of time.  Patient stated that the symptoms started yesterday and have been intermittent throughout the last 12:48 p.m..  CT evaluation urinalysis in the unremarkable showed no signs of any kidney stones.  Patient may have a GI etiology for the presenting symptomatology as the patient does have diverticulosis but no evidence of diverticulitis at this time I will be treat the patient with outpatient Flagyl provided with p.o. pain control.  Patient has no evidence of any vascular other structural etiology for the patient's presenting symptomatology.  The patient does have uterine fibroids but I do not believe that this is contributing to the patient's presenting symptomatology.    Amount and/or Complexity of Data Reviewed  Labs: ordered. Decision-making details documented in ED Course.  Radiology: ordered. Decision-making details documented in ED Course.    Risk  Prescription drug management.            Scribe Attestation:   Scribe #1: I  performed the above scribed service and the documentation accurately describes the services I performed. I attest to the accuracy of the note.        ED Course as of 12/19/24 1752   Thu Dec 19, 2024   1657 This patient presents to the emergency department with complaints of right flank CVA pain.  I strongly considered the likelihood of nephrolithiasis however the CT results wear definitive and no evidence of any  pathology is indicated at this time.  Patient does have diverticulosis this may very well be the the patient's presenting symptoms   [MI]   1658 CBC auto differential(!) [MI]   1658 Comprehensive metabolic panel(!) [MI]   1726 Patient with elevated blood pressure 205/86 at time of discharge.  Patient takes her nighttime antihypertensives and has not taken them yet.  In a go ahead give the patient clonidine 0.1.  Patient is asymptomatic. [MI]      ED Course User Index  [MI] Arvin Hoyos MD                           This document was produced by a scribe under my direction and in my presence. I agree with the content of the note and have made any necessary edits.     Arvin Hoyos MD    12/19/2024 5:01 PM        Clinical Impression:  Final diagnoses:  [R10.9] Flank pain  [K57.90] Diverticulosis (Primary)          ED Disposition Condition    Discharge Stable          ED Prescriptions       Medication Sig Dispense Start Date End Date Auth. Provider    metroNIDAZOLE (FLAGYL) 500 MG tablet Take 1 tablet (500 mg total) by mouth 3 (three) times daily. for 7 days 21 tablet 12/19/2024 12/26/2024 Arvin Hoyos MD    HYDROcodone-acetaminophen (NORCO) 5-325 mg per tablet Take 1 tablet by mouth every 4 (four) hours as needed for Pain. 18 tablet 12/19/2024 -- Arvin Hoyos MD    ondansetron (ZOFRAN-ODT) 4 MG TbDL Take 1 tablet (4 mg total) by mouth every 6 (six) hours as needed. 10 tablet 12/19/2024 -- Arvin Hoyos MD          Follow-up Information       Follow up With Specialties  Details Why Contact Info    Joao Wilcox Jr., MD Hospitalist, Family Medicine Schedule an appointment as soon as possible for a visit in 3 days  [address]  [address]  [facility] [address]  [phone]               Arvin Hoyos MD  12/19/24 1702       Arvin Hoyos MD  12/19/24 1702       Arvin Hoyos MD  12/19/24 2352

## 2024-12-19 NOTE — ED NOTES
Pt presents to the ED today for right flank pain x1 day. Pt reports symptoms began yesterday and subsided for a couple of hours but came back early this morning. Pt reports right flank pain radiates to her abdomen reports generalized abdominal discomfort. Pt denies denies dysuria, hematuria, fever/chills, vaginal discharge, or any other genitourinary complaints. Pt is alert and oriented, currently complaining of 10/10 flank pain, VSS.

## 2024-12-19 NOTE — ED NOTES
Pt is hypertensive at this time. BP taken in BUE with SBP>200s. Pt is asymptomatic at this time: denies HA, dizziness, cp, vision changes or any other complaint. Pt reports taking her home BP meds before bedtime with last intake yesterday of her hydrochlorothiazide. Dr. Hoyos aware, awaiting orders at this time.

## 2025-01-04 ENCOUNTER — HOSPITAL ENCOUNTER (EMERGENCY)
Facility: HOSPITAL | Age: 70
Discharge: HOME | End: 2025-01-04
Attending: EMERGENCY MEDICINE
Payer: MEDICARE

## 2025-01-04 VITALS
HEART RATE: 74 BPM | BODY MASS INDEX: 46.32 KG/M2 | SYSTOLIC BLOOD PRESSURE: 179 MMHG | RESPIRATION RATE: 17 BRPM | WEIGHT: 287 LBS | TEMPERATURE: 99 F | DIASTOLIC BLOOD PRESSURE: 78 MMHG | OXYGEN SATURATION: 99 %

## 2025-01-04 DIAGNOSIS — R10.9 RIGHT FLANK PAIN: Primary | ICD-10-CM

## 2025-01-04 LAB
ALBUMIN SERPL BCP-MCNC: 3.5 G/DL (ref 3.5–5.2)
ALLENS TEST: NORMAL
ALP SERPL-CCNC: 86 U/L (ref 40–150)
ALT SERPL W/O P-5'-P-CCNC: 16 U/L (ref 10–44)
ANION GAP SERPL CALC-SCNC: 13 MMOL/L (ref 8–16)
AST SERPL-CCNC: 23 U/L (ref 10–40)
BASOPHILS # BLD AUTO: 0.02 K/UL (ref 0–0.2)
BASOPHILS NFR BLD: 0.3 % (ref 0–1.9)
BILIRUB SERPL-MCNC: 1 MG/DL (ref 0.1–1)
BILIRUB UR QL STRIP: NEGATIVE
BUN SERPL-MCNC: 9 MG/DL (ref 8–23)
CALCIUM SERPL-MCNC: 9.1 MG/DL (ref 8.7–10.5)
CHLORIDE SERPL-SCNC: 106 MMOL/L (ref 95–110)
CLARITY UR REFRACT.AUTO: CLEAR
CO2 SERPL-SCNC: 23 MMOL/L (ref 23–29)
COLOR UR AUTO: YELLOW
CREAT SERPL-MCNC: 0.8 MG/DL (ref 0.5–1.4)
DELSYS: NORMAL
DIFFERENTIAL METHOD BLD: ABNORMAL
EOSINOPHIL # BLD AUTO: 0 K/UL (ref 0–0.5)
EOSINOPHIL NFR BLD: 0.2 % (ref 0–8)
ERYTHROCYTE [DISTWIDTH] IN BLOOD BY AUTOMATED COUNT: 13.2 % (ref 11.5–14.5)
EST. GFR  (NO RACE VARIABLE): >60 ML/MIN/1.73 M^2
GLUCOSE SERPL-MCNC: 90 MG/DL (ref 70–110)
GLUCOSE UR QL STRIP: NEGATIVE
HCT VFR BLD AUTO: 43 % (ref 37–48.5)
HCV AB SERPL QL IA: NORMAL
HGB BLD-MCNC: 13.9 G/DL (ref 12–16)
HGB UR QL STRIP: NEGATIVE
HIV 1+2 AB+HIV1 P24 AG SERPL QL IA: NORMAL
IMM GRANULOCYTES # BLD AUTO: 0.02 K/UL (ref 0–0.04)
IMM GRANULOCYTES NFR BLD AUTO: 0.3 % (ref 0–0.5)
KETONES UR QL STRIP: ABNORMAL
LDH SERPL L TO P-CCNC: 1.06 MMOL/L (ref 0.5–2.2)
LEUKOCYTE ESTERASE UR QL STRIP: NEGATIVE
LIPASE SERPL-CCNC: 15 U/L (ref 4–60)
LYMPHOCYTES # BLD AUTO: 1.3 K/UL (ref 1–4.8)
LYMPHOCYTES NFR BLD: 20.3 % (ref 18–48)
MCH RBC QN AUTO: 31.1 PG (ref 27–31)
MCHC RBC AUTO-ENTMCNC: 32.3 G/DL (ref 32–36)
MCV RBC AUTO: 96 FL (ref 82–98)
MONOCYTES # BLD AUTO: 0.2 K/UL (ref 0.3–1)
MONOCYTES NFR BLD: 3.9 % (ref 4–15)
NEUTROPHILS # BLD AUTO: 4.7 K/UL (ref 1.8–7.7)
NEUTROPHILS NFR BLD: 75 % (ref 38–73)
NITRITE UR QL STRIP: NEGATIVE
NRBC BLD-RTO: 0 /100 WBC
PH UR STRIP: 7 [PH] (ref 5–8)
PLATELET # BLD AUTO: 279 K/UL (ref 150–450)
PMV BLD AUTO: 11.8 FL (ref 9.2–12.9)
POTASSIUM SERPL-SCNC: 4 MMOL/L (ref 3.5–5.1)
PROT SERPL-MCNC: 8.2 G/DL (ref 6–8.4)
PROT UR QL STRIP: NEGATIVE
RBC # BLD AUTO: 4.47 M/UL (ref 4–5.4)
SAMPLE: NORMAL
SITE: NORMAL
SODIUM SERPL-SCNC: 142 MMOL/L (ref 136–145)
SP GR UR STRIP: 1.01 (ref 1–1.03)
URN SPEC COLLECT METH UR: ABNORMAL
WBC # BLD AUTO: 6.22 K/UL (ref 3.9–12.7)

## 2025-01-04 PROCEDURE — 99285 EMERGENCY DEPT VISIT HI MDM: CPT | Mod: 25

## 2025-01-04 PROCEDURE — 86803 HEPATITIS C AB TEST: CPT | Performed by: PHYSICIAN ASSISTANT

## 2025-01-04 PROCEDURE — 96374 THER/PROPH/DIAG INJ IV PUSH: CPT

## 2025-01-04 PROCEDURE — 83605 ASSAY OF LACTIC ACID: CPT

## 2025-01-04 PROCEDURE — 83690 ASSAY OF LIPASE: CPT | Performed by: EMERGENCY MEDICINE

## 2025-01-04 PROCEDURE — 63600175 PHARM REV CODE 636 W HCPCS

## 2025-01-04 PROCEDURE — 85025 COMPLETE CBC W/AUTO DIFF WBC: CPT | Performed by: EMERGENCY MEDICINE

## 2025-01-04 PROCEDURE — 25500020 PHARM REV CODE 255: Performed by: EMERGENCY MEDICINE

## 2025-01-04 PROCEDURE — 80053 COMPREHEN METABOLIC PANEL: CPT | Performed by: EMERGENCY MEDICINE

## 2025-01-04 PROCEDURE — 87389 HIV-1 AG W/HIV-1&-2 AB AG IA: CPT | Performed by: PHYSICIAN ASSISTANT

## 2025-01-04 PROCEDURE — 99900035 HC TECH TIME PER 15 MIN (STAT)

## 2025-01-04 PROCEDURE — 81003 URINALYSIS AUTO W/O SCOPE: CPT | Performed by: EMERGENCY MEDICINE

## 2025-01-04 RX ORDER — LIDOCAINE 50 MG/G
1 PATCH TOPICAL DAILY
Qty: 14 PATCH | Refills: 0 | Status: SHIPPED | OUTPATIENT
Start: 2025-01-04

## 2025-01-04 RX ORDER — CYCLOBENZAPRINE HCL 10 MG
10 TABLET ORAL NIGHTLY PRN
Qty: 8 TABLET | Refills: 0 | Status: SHIPPED | OUTPATIENT
Start: 2025-01-04 | End: 2025-01-12

## 2025-01-04 RX ORDER — MORPHINE SULFATE 4 MG/ML
4 INJECTION, SOLUTION INTRAMUSCULAR; INTRAVENOUS
Status: COMPLETED | OUTPATIENT
Start: 2025-01-04 | End: 2025-01-04

## 2025-01-04 RX ADMIN — IOHEXOL 100 ML: 350 INJECTION, SOLUTION INTRAVENOUS at 07:01

## 2025-01-04 RX ADMIN — MORPHINE SULFATE 4 MG: 4 INJECTION INTRAVENOUS at 02:01

## 2025-01-04 NOTE — ED TRIAGE NOTES
Pt arrives via EMS with complaints of 10/10 sharp R side pain that radiates to her R flank. Pt denies SOB, chest pain, n&v, and diarrhea

## 2025-01-04 NOTE — ED PROVIDER NOTES
Encounter Date: 2025       History     Chief Complaint   Patient presents with    Abdominal Pain     R sided flank pain, more so after eating. X 2 weeks. Recent dx of diverticulitis, has taken 1 dose of antibiotics. Fentanyl en route     69-year-old female history of hypertension asthma with surgical history of cholecystectomy we presenting to emergency department due to continued right flank pain onset 3 weeks ago.  Patient reports that she was evaluated for this presentation at [facility] however had a negative renal stone study.  Patient reports she has been taking antibiotics for a possible urine infection.  However the last 3 days she reports her symptoms have worsened the flank pain that has nonradiating.  Denies changes in bowel movements.  No dysuria hematuria or frequency.  No fevers or chills.  Still tolerating p.o. no nausea or vomiting.      Review of patient's allergies indicates:   Allergen Reactions    Lisinopril Other (See Comments)     angioedema     Past Medical History:   Diagnosis Date    Asthma     Bronchitis     Hypertension      Past Surgical History:   Procedure Laterality Date    ANKLE SURGERY       SECTION      CHOLECYSTECTOMY       Family History   Problem Relation Name Age of Onset    Hypertension Mother      Stroke Mother       Social History     Tobacco Use    Smoking status: Never    Smokeless tobacco: Never   Substance Use Topics    Alcohol use: No    Drug use: No     Review of Systems  See HPI  Physical Exam     Initial Vitals [25 1336]   BP Pulse Resp Temp SpO2   (!) 170/74 60 (!) 22 97.3 °F (36.3 °C) 98 %      MAP       --         Physical Exam    Vitals reviewed.  Constitutional: She appears well-developed and well-nourished.   HENT:   Head: Normocephalic and atraumatic.   Eyes: Conjunctivae and EOM are normal.   Neck:   Normal range of motion.  Cardiovascular:  Normal rate.           Pulmonary/Chest: No respiratory distress.   Abdominal: Abdomen is soft.  She exhibits no distension. There is no abdominal tenderness.   Reproducible right flank tenderness without overlying skin changes. There is no rebound.   Musculoskeletal:         General: Normal range of motion.      Cervical back: Normal range of motion.     Neurological: She is alert and oriented to person, place, and time.   Skin: Skin is warm and dry.   Psychiatric: She has a normal mood and affect. Thought content normal.         ED Course   Procedures  Labs Reviewed   CBC W/ AUTO DIFFERENTIAL - Abnormal       Result Value    WBC 6.22      RBC 4.47      Hemoglobin 13.9      Hematocrit 43.0      MCV 96      MCH 31.1 (*)     MCHC 32.3      RDW 13.2      Platelets 279      MPV 11.8      Immature Granulocytes 0.3      Gran # (ANC) 4.7      Immature Grans (Abs) 0.02      Lymph # 1.3      Mono # 0.2 (*)     Eos # 0.0      Baso # 0.02      nRBC 0      Gran % 75.0 (*)     Lymph % 20.3      Mono % 3.9 (*)     Eosinophil % 0.2      Basophil % 0.3      Differential Method Automated      Narrative:     Release to patient->Immediate   URINALYSIS, REFLEX TO URINE CULTURE - Abnormal    Specimen UA Urine, Clean Catch      Color, UA Yellow      Appearance, UA Clear      pH, UA 7.0      Specific Gravity, UA 1.010      Protein, UA Negative      Glucose, UA Negative      Ketones, UA 1+ (*)     Bilirubin (UA) Negative      Occult Blood UA Negative      Nitrite, UA Negative      Leukocytes, UA Negative      Narrative:     Specimen Source->Urine   HEPATITIS C ANTIBODY    Hepatitis C Ab Non-reactive      Narrative:     Release to patient->Immediate   HIV 1 / 2 ANTIBODY    HIV 1/2 Ag/Ab Non-reactive      Narrative:     Release to patient->Immediate   COMPREHENSIVE METABOLIC PANEL    Sodium 142      Potassium 4.0      Chloride 106      CO2 23      Glucose 90      BUN 9      Creatinine 0.8      Calcium 9.1      Total Protein 8.2      Albumin 3.5      Total Bilirubin 1.0      Alkaline Phosphatase 86      AST 23      ALT 16      eGFR  >60.0      Anion Gap 13      Narrative:     Release to patient->Immediate   LIPASE    Lipase 15      Narrative:     Release to patient->Immediate   ISTAT LACTATE    POC Lactate 1.06      Sample VENOUS      Site Other      Allens Test N/A      DelSys Room Air            Imaging Results              CT Abdomen Pelvis With IV Contrast NO Oral Contrast (Final result)  Result time 01/04/25 19:40:41      Final result by Dalton Pantoja MD (01/04/25 19:40:41)                   Impression:      Mild anterior bladder wall thickening with surrounding subtle fat stranding.  Findings can be seen with cystitis.  Recommend correlation with urinalysis.    Changes of prior cholecystectomy.  No abnormality within the gallbladder fossa.    Diverticulosis coli without evidence of acute diverticulitis.    Electronically signed by resident: Zechariah Sparks  Date:    01/04/2025  Time:    19:08    Electronically signed by: Dalton Pantoja MD  Date:    01/04/2025  Time:    19:40               Narrative:    EXAMINATION:  CT ABDOMEN PELVIS WITH IV CONTRAST    CLINICAL HISTORY:  Abdominal pain, acute, nonlocalized;    TECHNIQUE:  The patient was surveyed from the lung bases through the pelvis after the administration of 100 cc Omni 350 IV contrast.  Oral contrast was not administered. The data was reconstructed for coronal, sagittal, and axial images.    COMPARISON:  CT renal stone 12/19/2024.    FINDINGS:  LOWER THORAX: No pulmonary consolidation or pleural effusion. The base of the heart is normal in size without pericardial effusion.    HEPATOBILIARY: Liver is normal in size.  Subcentimeter hypodensity in the right hepatic lobe.  Gallbladder surgically absent.  Mild biliary ductal dilatation, similar to prior and likely related to cholecystectomy status..    SPLEEN: Normal size without focal lesion.  Accessory splenule.    PANCREAS: No focal masses or ductal dilatation.    ADRENALS: No adrenal nodules.    KIDNEYS/URETERS: Kidneys are asymmetric  in size, left greater than right. Symmetric enhancement.  No hydronephrosis, stones, or solid mass lesions. Ureters are normal in caliber without inflammatory change.    BLADDER/PELVIC ORGANS: Mild anterior bladder wall thickening and subtle surrounding fat stranding.Lobular uterine contour with heterogeneously attenuating, calcified fibroids.  No adnexal masses.    PERITONEUM / RETROPERITONEUM: No free air or fluid.    LYMPH NODES: No lymphadenopathy.    VESSELS: Abdominal aorta is normal in course and caliber.Mild atherosclerosis.Aortic branch vessels are patent.Portal venous system is patent.    GI TRACT: No focal gastric wall thickening. No bowel distention or wall thickening. Normal appendix.Colonic diverticulosis.    BONES AND SOFT TISSUES: Fat containing umbilical hernia.  Mild degenerative change of the spine and pelvis.  No fractures or focal osseous lesions.                                       Medications   morphine injection 4 mg (4 mg Intravenous Given 1/4/25 9410)   iohexoL (OMNIPAQUE 350) injection 100 mL (100 mLs Intravenous Given 1/4/25 0519)     Medical Decision Making  Patient  is a 69 y.o.  female  presenting to the emergency department with complaints of  right flank discomfort this has been intermittent in presentation over the last 3 weeks.  Previous evaluation at [facility] with negative renal stone study.    Differential diagnosis includes but  not limited to pyelonephritis, muscle spasm, diverticulitis    Significant labwork and diagnostic findings hemodynamically stable, afebrile no leukocytosis UA also negative for infection.  In further discussion patient reports pain is intermittent however can not report triggers.    Labwork and CT are once again reassuring therefore I will discharge patient home with symptomatic management.  Recommend she follows up with primary care physician if intermittent episodes continue.  Otherwise she can return if symptoms worsen.  Patient is  agreeable and feels comfortable with plan.  Thoroughly discussed results.    Amount and/or Complexity of Data Reviewed  Labs: ordered.  Radiology: ordered.    Risk  Prescription drug management.                                      Clinical Impression:  Final diagnoses:  [R10.9] Right flank pain (Primary)          ED Disposition Condition    Discharge Stable          ED Prescriptions       Medication Sig Dispense Start Date End Date Auth. Provider    LIDOcaine (LIDODERM) 5 % Place 1 patch onto the skin once daily. Remove & Discard patch within 12 hours or as directed by MD 14 patch 1/4/2025 -- Елена Castro PA-C    cyclobenzaprine (FLEXERIL) 10 MG tablet Take 1 tablet (10 mg total) by mouth nightly as needed for Muscle spasms. 8 tablet 1/4/2025 1/12/2025 Елена Castro PA-C          Follow-up Information       Follow up With Specialties Details Why Contact Info    Joao Wilcox Jr., MD Hospitalist, Family Medicine In 1 week Re-evaluation, if symptoms continue [address]  [address]  [facility] [address]  [phone]      [facility] - Emergency Dept Emergency Medicine  If symptoms worsen [address] [facility] [address] [phone]             Елена Castro PA-C  01/04/25 2037

## 2025-01-05 NOTE — DISCHARGE INSTRUCTIONS
As discussed your blood work in urine are negative for infection.  Your CT also does not show any infectious or surgical concerns.    It is possible that your pain is secondary to muscle spasms as this has it on and off.  I would like you to continue to monitor your symptoms to see if the pain comes on specifically after activity or if lying down for long periods of times.    I will we will prescribe you Lidoderm patches to help, you can take Tylenol at home and muscle relaxants however should only be taken nightly.     You can follow up with PCP in a week for further eval if sometimes continue

## 2025-06-10 ENCOUNTER — HOSPITAL ENCOUNTER (EMERGENCY)
Facility: HOSPITAL | Age: 70
Discharge: HOME | End: 2025-06-11
Attending: EMERGENCY MEDICINE
Payer: MEDICARE

## 2025-06-10 VITALS
BODY MASS INDEX: 45.32 KG/M2 | OXYGEN SATURATION: 98 % | DIASTOLIC BLOOD PRESSURE: 87 MMHG | HEIGHT: 66 IN | WEIGHT: 282 LBS | TEMPERATURE: 97 F | HEART RATE: 78 BPM | SYSTOLIC BLOOD PRESSURE: 198 MMHG | RESPIRATION RATE: 18 BRPM

## 2025-06-10 DIAGNOSIS — M54.9 BACK PAIN: Primary | ICD-10-CM

## 2025-06-10 PROCEDURE — 99284 EMERGENCY DEPT VISIT MOD MDM: CPT | Mod: 25

## 2025-06-10 PROCEDURE — 93010 ELECTROCARDIOGRAM REPORT: CPT | Mod: ,,, | Performed by: INTERNAL MEDICINE

## 2025-06-10 PROCEDURE — 25000003 PHARM REV CODE 250: Performed by: EMERGENCY MEDICINE

## 2025-06-10 PROCEDURE — 93005 ELECTROCARDIOGRAM TRACING: CPT

## 2025-06-10 RX ORDER — IBUPROFEN 400 MG/1
400 TABLET, FILM COATED ORAL
Status: COMPLETED | OUTPATIENT
Start: 2025-06-10 | End: 2025-06-10

## 2025-06-10 RX ORDER — OXYCODONE HYDROCHLORIDE 5 MG/1
5 TABLET ORAL
Refills: 0 | Status: COMPLETED | OUTPATIENT
Start: 2025-06-10 | End: 2025-06-10

## 2025-06-10 RX ORDER — ACETAMINOPHEN 500 MG
1000 TABLET ORAL
Status: COMPLETED | OUTPATIENT
Start: 2025-06-10 | End: 2025-06-10

## 2025-06-10 RX ORDER — HYDROCHLOROTHIAZIDE 25 MG/1
25 TABLET ORAL
Status: COMPLETED | OUTPATIENT
Start: 2025-06-10 | End: 2025-06-10

## 2025-06-10 RX ADMIN — ACETAMINOPHEN 1000 MG: 500 TABLET ORAL at 09:06

## 2025-06-10 RX ADMIN — OXYCODONE HYDROCHLORIDE 5 MG: 5 TABLET ORAL at 09:06

## 2025-06-10 RX ADMIN — HYDROCHLOROTHIAZIDE 25 MG: 25 TABLET ORAL at 09:06

## 2025-06-10 RX ADMIN — IBUPROFEN 400 MG: 400 TABLET ORAL at 09:06

## 2025-06-11 LAB
OHS QRS DURATION: 98 MS
OHS QTC CALCULATION: 467 MS

## 2025-06-11 NOTE — ED TRIAGE NOTES
[place], a 70 y.o. female presents to the ED w/ complaint of upper right back pain since yesterday. Pt was here before for the same issue and was told it was sprained muscle. Pt unsure if she lifted anything heavy or did something to cause it this time. Denies chest pain or shortness of breath. Denies GI or  changes.    Triage note:  Chief Complaint   Patient presents with    Back Pain     Upper back pain located to left of spine since yesterday. Denies injury     Review of patient's allergies indicates:   Allergen Reactions    Lisinopril Other (See Comments)     angioedema     Past Medical History:   Diagnosis Date    Asthma     Bronchitis     Hypertension

## 2025-06-11 NOTE — ED PROVIDER NOTES
Encounter Date: 6/10/2025       History     Chief Complaint   Patient presents with    Back Pain     Upper back pain located to left of spine since yesterday. Denies injury     70-year-old female, history of asthma, hypertension, complaining of back pain.  Patient reports onset of symptoms around 2:00 p.m. yesterday.  Pain is localized to her right upper back and is improved with certain movements.  Patient reports that last night for instance she could not sleep when lying on her left side but when she turned onto her right side, with direct pressure to that area, the pain improved.  Pain has been intermittent throughout the day.  Patient has not taken any pain medication.  She has no history of trauma or recent injuries.  No fevers, chills, chest pain, shortness of breath, abdominal pain.  He should has not taken her evening dose of hydrochlorothiazide.    The history is provided by the patient.     Review of patient's allergies indicates:   Allergen Reactions    Lisinopril Other (See Comments)     angioedema     Past Medical History:   Diagnosis Date    Asthma     Bronchitis     Hypertension      Past Surgical History:   Procedure Laterality Date    ANKLE SURGERY       SECTION      CHOLECYSTECTOMY       Family History   Problem Relation Name Age of Onset    Hypertension Mother      Stroke Mother       Social History[1]  Review of Systems    Physical Exam     Initial Vitals   BP Pulse Resp Temp SpO2   06/10/25 2037 06/10/25 2034 06/10/25 2034 06/10/25 2034 06/10/25 2034   (!) 239/99 84 20 97.4 °F (36.3 °C) 97 %      MAP       --                Physical Exam    Nursing note and vitals reviewed.  Constitutional: She appears well-developed and well-nourished. She is not diaphoretic. No distress.   Eyes: Conjunctivae are normal.   Neck: Neck supple.   Cardiovascular:  Normal rate.           Pulmonary/Chest: Breath sounds normal. No respiratory distress. She has no wheezes. She has no rhonchi. She has no rales.  She exhibits no tenderness.   Abdominal: Abdomen is soft. She exhibits no distension. There is no abdominal tenderness. There is no rebound and no guarding.   Musculoskeletal:      Cervical back: Neck supple. No bony tenderness.      Thoracic back: No bony tenderness.        Back:       Comments: Exquisite tenderness with palpation of the right upper back     Skin: Skin is warm and dry. No rash noted. No pallor.   Psychiatric: She has a normal mood and affect. Thought content normal.         ED Course   Procedures  Labs Reviewed - No data to display     ECG Results              EKG 12-lead (Final result)        Collection Time Result Time QRS Duration OHS QTC Calculation    06/10/25 20:41:54 06/11/25 08:23:04 98 467                     Final result by Interface, Lab In [facility] (06/11/25 08:23:14)                   Narrative:    Test Reason : M54.9,    Vent. Rate :  84 BPM     Atrial Rate :  84 BPM     P-R Int : 186 ms          QRS Dur :  98 ms      QT Int : 396 ms       P-R-T Axes :    180 193 degrees    QTcB Int : 467 ms     Suspect arm lead reversal, interpretation assumes no reversal  Normal sinus rhythm  Lateral infarct ,age undetermined vs lead reversal  Abnormal ECG  When compared with ECG of 29-Nov-2024 01:43,  Significant changes have occurred  Confirmed by Nitin Lebron ([id number]) on 6/11/2025 8:23:03 AM    Referred By: AAAREFERRAL SELF           Confirmed By: Nitin Lebron                                  Imaging Results              X-Ray Chest PA And Lateral (Final result)  Result time 06/10/25 21:36:58      Final result by Erica Leon MD (06/10/25 21:36:58)                   Impression:      No acute cardiopulmonary process identified.      Electronically signed by: Erica Leon MD  Date:    06/10/2025  Time:    21:36               Narrative:    EXAMINATION:  XR CHEST PA AND LATERAL    CLINICAL HISTORY:  Dorsalgia, unspecified    TECHNIQUE:  PA and lateral views of the chest were  performed.    COMPARISON:  December 2023.    FINDINGS:  Cardiac silhouette is normal in size.  Lungs are symmetrically expanded.  No evidence of focal consolidative process, pneumothorax, or significant pleural effusion.  No acute osseous abnormality identified.                                       Medications   hydroCHLOROthiazide tablet 25 mg (25 mg Oral Given 6/10/25 2135)   acetaminophen tablet 1,000 mg (1,000 mg Oral Given 6/10/25 2134)   ibuprofen tablet 400 mg (400 mg Oral Given 6/10/25 2133)   oxyCODONE immediate release tablet 5 mg (5 mg Oral Given 6/10/25 2134)     Medical Decision Making  Right upper back pain that is worse with certain movements.  Patient with no associated concerning red flag symptoms like chest pain, shortness of breath, numbness or weakness in extremities and on exam, pain is completely reproducible with palpation.  She has no rashes in the area that would raise the suspicion for zoster in her lungs are clear bilaterally and chest x-ray performed rules out a large infiltrate or pneumothorax that would explain the pain.  Her blood pressure was elevated on arrival but on review of the EMR this frequently is an issue for the patient and I suspect that she likely needs to be on an additional medication beyond just her hydrochlorothiazide.  Her pain has improved significantly with pain medication in the ED.  I do think it is safe for her to be discharged home with ED return precautions.    Amount and/or Complexity of Data Reviewed  External Data Reviewed: labs, radiology and notes.  Radiology: ordered and independent interpretation performed. Decision-making details documented in ED Course.    Risk  OTC drugs.  Prescription drug management.                                      Clinical Impression:  Final diagnoses:  [M54.9] Back pain (Primary)          ED Disposition Condition    Discharge Stable          ED Prescriptions    None       Follow-up Information       Follow up With  Specialties Details Why Contact Info    Joao Zuluaga Jr., MD Hospitalist, Family Medicine Call in 1 day  [address] [place] [facility] [address]  [phone]      [facility] Emergency Medicine  If symptoms worsen [address]  [facility] [address] [phone]                   [1]   Social History  Tobacco Use    Smoking status: Never    Smokeless tobacco: Never   Substance Use Topics    Alcohol use: No    Drug use: No        Ayana Rhoades MD  06/12/25 1912

## 2025-06-11 NOTE — DISCHARGE INSTRUCTIONS
I am worried that you might need to be on additional blood pressure medications.  Please reach out to your primary care doctor tomorrow for further guidance.